# Patient Record
Sex: FEMALE | Race: WHITE | NOT HISPANIC OR LATINO | Employment: FULL TIME | RURAL
[De-identification: names, ages, dates, MRNs, and addresses within clinical notes are randomized per-mention and may not be internally consistent; named-entity substitution may affect disease eponyms.]

---

## 2021-08-02 ENCOUNTER — TELEPHONE (OUTPATIENT)
Dept: FAMILY MEDICINE CLINIC | Facility: CLINIC | Age: 46
End: 2021-08-02

## 2021-08-02 ENCOUNTER — OFFICE VISIT (OUTPATIENT)
Dept: FAMILY MEDICINE CLINIC | Facility: CLINIC | Age: 46
End: 2021-08-02

## 2021-08-02 VITALS
DIASTOLIC BLOOD PRESSURE: 86 MMHG | OXYGEN SATURATION: 98 % | HEIGHT: 66 IN | SYSTOLIC BLOOD PRESSURE: 136 MMHG | TEMPERATURE: 98.4 F | WEIGHT: 256 LBS | HEART RATE: 66 BPM | RESPIRATION RATE: 18 BRPM | BODY MASS INDEX: 41.14 KG/M2

## 2021-08-02 DIAGNOSIS — M51.36 DDD (DEGENERATIVE DISC DISEASE), LUMBAR: ICD-10-CM

## 2021-08-02 DIAGNOSIS — M79.7 FIBROMYALGIA: ICD-10-CM

## 2021-08-02 DIAGNOSIS — F32.81 PREMENSTRUAL DYSPHORIC SYNDROME: ICD-10-CM

## 2021-08-02 DIAGNOSIS — I10 ESSENTIAL HYPERTENSION: Primary | ICD-10-CM

## 2021-08-02 DIAGNOSIS — Z11.59 NEED FOR HEPATITIS C SCREENING TEST: ICD-10-CM

## 2021-08-02 DIAGNOSIS — F41.9 ANXIETY: ICD-10-CM

## 2021-08-02 DIAGNOSIS — K21.9 GASTROESOPHAGEAL REFLUX DISEASE, UNSPECIFIED WHETHER ESOPHAGITIS PRESENT: ICD-10-CM

## 2021-08-02 DIAGNOSIS — Z13.220 SCREENING FOR HYPERLIPIDEMIA: ICD-10-CM

## 2021-08-02 DIAGNOSIS — R53.81 MALAISE AND FATIGUE: ICD-10-CM

## 2021-08-02 DIAGNOSIS — F33.0 MILD EPISODE OF RECURRENT MAJOR DEPRESSIVE DISORDER (HCC): Primary | ICD-10-CM

## 2021-08-02 DIAGNOSIS — R53.83 MALAISE AND FATIGUE: ICD-10-CM

## 2021-08-02 PROCEDURE — 99203 OFFICE O/P NEW LOW 30 MIN: CPT | Performed by: FAMILY MEDICINE

## 2021-08-02 RX ORDER — MELOXICAM 15 MG/1
15 TABLET ORAL DAILY
COMMUNITY
End: 2021-08-02 | Stop reason: SDUPTHER

## 2021-08-02 RX ORDER — OMEPRAZOLE 20 MG/1
20 CAPSULE, DELAYED RELEASE ORAL DAILY
Qty: 30 CAPSULE | Refills: 4 | Status: SHIPPED | OUTPATIENT
Start: 2021-08-02 | End: 2022-11-17 | Stop reason: SDUPTHER

## 2021-08-02 RX ORDER — FLUOXETINE 10 MG/1
10 CAPSULE ORAL DAILY
COMMUNITY
End: 2021-08-02 | Stop reason: SDUPTHER

## 2021-08-02 RX ORDER — MELOXICAM 15 MG/1
15 TABLET ORAL DAILY
Qty: 30 TABLET | Refills: 12 | Status: SHIPPED | OUTPATIENT
Start: 2021-08-02 | End: 2022-11-17 | Stop reason: SDUPTHER

## 2021-08-02 RX ORDER — AMLODIPINE BESYLATE AND BENAZEPRIL HYDROCHLORIDE 10; 20 MG/1; MG/1
1 CAPSULE ORAL DAILY
COMMUNITY
End: 2021-08-02

## 2021-08-02 RX ORDER — FLUOXETINE 10 MG/1
10 CAPSULE ORAL DAILY
Qty: 30 CAPSULE | Refills: 12 | Status: SHIPPED | OUTPATIENT
Start: 2021-08-02 | End: 2021-08-19 | Stop reason: DRUGHIGH

## 2021-08-02 NOTE — PROGRESS NOTES
"  Chief Complaint   Patient presents with   • Annual Exam         Subjective   Alexus Serrano is a 45 y.o. female here for a Well Woman Visit. Energy level is described as poor and she is sleeping poorly. She sleeps 3 hours nightly. Last pap was about a year ago has not established care with GYN in [place] yet. Contraception: none. Patient exercises irregularly. Nutrition is described as in general, an \"unhealthy\" diet. Her   libido is abnormal. She reports that she does perform monthly self breast exam.      History of Present Illness     I personally reviewed and updated the patient's allergies, medications, problem list, and past medical, surgical, social, and family history.     Allergies:  No Known Allergies    Social History:  Social History     Socioeconomic History   • Marital status: Single     Spouse name: Not on file   • Number of children: Not on file   • Years of education: Not on file   • Highest education level: Not on file   Tobacco Use   • Smoking status: Current Every Day Smoker     Start date: 8/2/2021   • Tobacco comment: vapes    Vaping Use   • Vaping Use: Every day   • Substances: Nicotine, Flavoring   • Devices: Pre-filled or refillable cartridge   Substance and Sexual Activity   • Alcohol use: Not Currently   • Drug use: Not Currently     Types: Methamphetamines     Comment: clean for 27 months    • Sexual activity: Not Currently       Family History:  Family History   Problem Relation Age of Onset   • Cancer Mother 61        lung   • Colon cancer Maternal Grandmother 62   • Breast cancer Other         paternal aunt and cousin       Past Medical History :  Active Ambulatory Problems     Diagnosis Date Noted   • Fibromyalgia 07/11/2012   • DDD (degenerative disc disease), lumbar 07/11/2012   • Premenstrual dysphoric syndrome 07/11/2012   • Essential hypertension 08/02/2021   • Anxiety 08/02/2021   • Mild episode of recurrent major depressive disorder (CMS/[facility]) 08/02/2021   • GERD " "(gastroesophageal reflux disease) 08/02/2021     Resolved Ambulatory Problems     Diagnosis Date Noted   • No Resolved Ambulatory Problems     No Additional Past Medical History       Medication List:    Current Outpatient Medications:   •  FLUoxetine (PROzac) 10 MG capsule, Take 1 capsule by mouth Daily., Disp: 30 capsule, Rfl: 12  •  meloxicam (MOBIC) 15 MG tablet, Take 1 tablet by mouth Daily., Disp: 30 tablet, Rfl: 12  •  omeprazole (priLOSEC) 20 MG capsule, Take 1 capsule by mouth Daily., Disp: 30 capsule, Rfl: 4    Past Surgical History:  History reviewed. No pertinent surgical history.    Depression Screen:   No flowsheet data found.    Fall Risk Screen:  EYAD Fall Risk Assessment has not been completed.    Review Of Systems:  Review of Systems   Constitutional: Negative for activity change and fever.   HENT: Negative for ear pain, rhinorrhea, sinus pressure and voice change.    Eyes: Negative for visual disturbance.   Respiratory: Negative for cough and shortness of breath.    Cardiovascular: Negative for chest pain.   Gastrointestinal: Negative for abdominal pain, diarrhea, nausea and vomiting.   Endocrine: Negative for cold intolerance and heat intolerance.   Genitourinary: Negative for frequency and urgency.   Musculoskeletal: Negative for arthralgias.   Skin: Negative for rash.   Neurological: Negative for syncope.   Hematological: Does not bruise/bleed easily.   Psychiatric/Behavioral: Negative for depressed mood. The patient is not nervous/anxious.        Physical Exam:  Vital Signs:  Visit Vitals  /86 (BP Location: Left arm, Patient Position: Sitting, Cuff Size: Adult)   Pulse 66   Temp 98.4 °F (36.9 °C)   Resp 18   Ht 167.6 cm (66\")   Wt 116 kg (256 lb)   SpO2 98%   BMI 41.32 kg/m²       Physical Exam  Vitals and nursing note reviewed.   Constitutional:       General: She is not in acute distress.     Appearance: She is well-developed. She is not diaphoretic.   HENT:      Head: Normocephalic " and atraumatic.      Right Ear: External ear normal.      Left Ear: External ear normal.      Nose: Nose normal.      Mouth/Throat:      Pharynx: No oropharyngeal exudate.   Eyes:      General: No scleral icterus.        Right eye: No discharge.         Left eye: No discharge.      Conjunctiva/sclera: Conjunctivae normal.      Pupils: Pupils are equal, round, and reactive to light.   Neck:      Thyroid: No thyromegaly.      Trachea: No tracheal deviation.   Cardiovascular:      Rate and Rhythm: Normal rate and regular rhythm.      Heart sounds: Normal heart sounds. No murmur heard.   No friction rub. No gallop.    Pulmonary:      Effort: Pulmonary effort is normal. No respiratory distress.      Breath sounds: Normal breath sounds. No stridor. No wheezing or rales.   Abdominal:      General: Bowel sounds are normal. There is no distension.      Palpations: Abdomen is soft. There is no mass.      Tenderness: There is no abdominal tenderness. There is no guarding or rebound.   Musculoskeletal:         General: No tenderness or deformity. Normal range of motion.      Cervical back: Normal range of motion and neck supple.   Lymphadenopathy:      Cervical: No cervical adenopathy.   Skin:     General: Skin is warm and dry.      Capillary Refill: Capillary refill takes less than 2 seconds.      Coloration: Skin is not pale.      Findings: No erythema or rash.   Neurological:      Mental Status: She is alert and oriented to person, place, and time.      Cranial Nerves: No cranial nerve deficit.      Sensory: No sensory deficit.      Motor: No tremor, atrophy or abnormal muscle tone.      Coordination: Coordination normal.      Gait: Gait normal.      Deep Tendon Reflexes: Reflexes are normal and symmetric. Reflexes normal.   Psychiatric:         Behavior: Behavior normal.         Thought Content: Thought content normal.         Cognition and Memory: Memory is not impaired. She does not exhibit impaired recent memory or  impaired remote memory.         Judgment: Judgment normal.           Assessment and Plan:  Problem List Items Addressed This Visit        Gastrointestinal Abdominal     GERD (gastroesophageal reflux disease)    Current Assessment & Plan     Medication refilled         Relevant Medications    omeprazole (priLOSEC) 20 MG capsule       Mental Health    Premenstrual dysphoric syndrome    Relevant Medications    FLUoxetine (PROzac) 10 MG capsule    Anxiety    Relevant Medications    FLUoxetine (PROzac) 10 MG capsule    Mild episode of recurrent major depressive disorder (CMS/HCC) - Primary    Current Assessment & Plan     Patient's depression is recurrent and is moderate without psychosis. Their depression is currently active and the condition is unchanged. This will be reassessed at the next regular appointment. F/U as described:patient will continue current medication therapy.         Relevant Medications    FLUoxetine (PROzac) 10 MG capsule       Musculoskeletal and Injuries    Fibromyalgia    Relevant Medications    meloxicam (MOBIC) 15 MG tablet       Neuro    DDD (degenerative disc disease), lumbar    Relevant Medications    meloxicam (MOBIC) 15 MG tablet          An After Visit Summary and PPPS were given to the patient.       Discussed injury prevention, diet and exercise, safe sexual practices, and screening for common diseases. Encouraged use of sunscreen and seatbelts. Discussed timing of  cervical cancer screening. Encouraged monthly self-breast exams, yearly clinical breast exams, and discussed timing of mammograms. Avoidance of tobacco encouraged. Limitation or avoidance of alcohol encouraged. Recommend yearly dental and eye exams. Also discussed monitoring of blood pressure, lipids.     I wore protective equipment throughout this patient encounter to include mask. Hand hygiene was performed before donning protective equipment and after removal when leaving the room.

## 2021-08-09 NOTE — ASSESSMENT & PLAN NOTE
Patient's depression is recurrent and is moderate without psychosis. Their depression is currently active and the condition is unchanged. This will be reassessed at the next regular appointment. F/U as described:patient will continue current medication therapy.

## 2021-08-19 ENCOUNTER — OFFICE VISIT (OUTPATIENT)
Dept: FAMILY MEDICINE CLINIC | Facility: CLINIC | Age: 46
End: 2021-08-19

## 2021-08-19 VITALS
BODY MASS INDEX: 41.62 KG/M2 | OXYGEN SATURATION: 98 % | RESPIRATION RATE: 18 BRPM | WEIGHT: 259 LBS | SYSTOLIC BLOOD PRESSURE: 140 MMHG | HEART RATE: 85 BPM | HEIGHT: 66 IN | TEMPERATURE: 98.4 F | DIASTOLIC BLOOD PRESSURE: 88 MMHG

## 2021-08-19 DIAGNOSIS — F33.0 MILD EPISODE OF RECURRENT MAJOR DEPRESSIVE DISORDER (HCC): Primary | ICD-10-CM

## 2021-08-19 DIAGNOSIS — I10 ESSENTIAL HYPERTENSION: ICD-10-CM

## 2021-08-19 DIAGNOSIS — J01.00 ACUTE NON-RECURRENT MAXILLARY SINUSITIS: ICD-10-CM

## 2021-08-19 DIAGNOSIS — F41.9 ANXIETY: ICD-10-CM

## 2021-08-19 PROCEDURE — 99214 OFFICE O/P EST MOD 30 MIN: CPT | Performed by: FAMILY MEDICINE

## 2021-08-19 RX ORDER — CEPHALEXIN 500 MG/1
500 CAPSULE ORAL 3 TIMES DAILY
Qty: 30 CAPSULE | Refills: 0 | Status: SHIPPED | OUTPATIENT
Start: 2021-08-19 | End: 2021-09-16

## 2021-08-19 RX ORDER — FLUOXETINE HYDROCHLORIDE 20 MG/1
20 CAPSULE ORAL DAILY
Qty: 30 CAPSULE | Refills: 2 | Status: SHIPPED | OUTPATIENT
Start: 2021-08-19 | End: 2021-09-16 | Stop reason: SDUPTHER

## 2021-08-19 NOTE — PROGRESS NOTES
Subjective   Alexus Serrano is a 45 y.o. female.     Chief Complaint   Patient presents with   • Depression   • Sore Throat       Depression  Visit Type: follow-up  Patient is not experiencing: decreased concentration, depressed mood, excessive worry, feelings of hopelessness, feelings of worthlessness, irritability, memory impairment, nervousness/anxiety, palpitations, shortness of breath, suicidal ideas and suicidal planning.  Frequency of symptoms: constantly   Severity: mild   Sleep quality: good  Nighttime awakenings: one to two  Compliance with medications:  %        Sore Throat   This is a chronic problem. The current episode started more than 1 year ago. The problem has been waxing and waning. Sore throat worse side: both. There has been no fever. The pain is moderate. Associated symptoms include trouble swallowing. Pertinent negatives include no abdominal pain, congestion, coughing, diarrhea, ear pain, hoarse voice, shortness of breath or vomiting. She has had no exposure to strep or mono. She has tried nothing for the symptoms.        I personally reviewed and updated the patient's allergies, medications, problem list, and past medical, surgical, social, and family history. I have reviewed and confirmed the accuracy of the History of Present Illness and Review of Symptoms as documented by the MA/LPN/RN. Mela Casey MD    Allergies:  No Known Allergies    Social History:  Social History     Socioeconomic History   • Marital status: Single     Spouse name: Not on file   • Number of children: Not on file   • Years of education: Not on file   • Highest education level: Not on file   Tobacco Use   • Smoking status: Current Every Day Smoker     Start date: 8/2/2021   • Tobacco comment: vapes    Vaping Use   • Vaping Use: Every day   • Substances: Nicotine, Flavoring   • Devices: Pre-filled or refillable cartridge   Substance and Sexual Activity   • Alcohol use: Not Currently   • Drug use: Not  Currently     Types: Methamphetamines     Comment: clean for 27 months    • Sexual activity: Not Currently       Family History:  Family History   Problem Relation Age of Onset   • Cancer Mother 61        lung   • Colon cancer Maternal Grandmother 62   • Breast cancer Other         paternal aunt and cousin       Past Medical History :  Patient Active Problem List   Diagnosis   • Fibromyalgia   • DDD (degenerative disc disease), lumbar   • Premenstrual dysphoric syndrome   • Essential hypertension   • Anxiety   • Mild episode of recurrent major depressive disorder (CMS/HCC)   • GERD (gastroesophageal reflux disease)   • Acute non-recurrent maxillary sinusitis       Medication List:    Current Outpatient Medications:   •  meloxicam (MOBIC) 15 MG tablet, Take 1 tablet by mouth Daily., Disp: 30 tablet, Rfl: 12  •  omeprazole (priLOSEC) 20 MG capsule, Take 1 capsule by mouth Daily., Disp: 30 capsule, Rfl: 4  •  cephalexin (KEFLEX) 500 MG capsule, Take 1 capsule by mouth 3 (Three) Times a Day., Disp: 30 capsule, Rfl: 0  •  FLUoxetine (PROzac) 20 MG capsule, Take 1 capsule by mouth Daily., Disp: 30 capsule, Rfl: 2    Past Surgical History:  No past surgical history on file.    Review of Systems:  Review of Systems   Constitutional: Negative for activity change, fever and irritability.   HENT: Positive for sore throat and trouble swallowing. Negative for congestion, ear pain, hoarse voice, rhinorrhea, sinus pressure and voice change.    Eyes: Negative for visual disturbance.   Respiratory: Negative for cough and shortness of breath.    Cardiovascular: Negative for chest pain and palpitations.   Gastrointestinal: Negative for abdominal pain, diarrhea, nausea and vomiting.   Endocrine: Negative for cold intolerance and heat intolerance.   Genitourinary: Negative for frequency and urgency.   Musculoskeletal: Negative for arthralgias.   Skin: Negative for rash.   Neurological: Negative for syncope.   Hematological: Does not  "bruise/bleed easily.   Psychiatric/Behavioral: Negative for decreased concentration, suicidal ideas and depressed mood. The patient is not nervous/anxious.        Physical Exam:  Vital Signs:  Vital Signs:   /88   Pulse 85   Temp 98.4 °F (36.9 °C)   Resp 18   Ht 167.6 cm (66\")   Wt 117 kg (259 lb)   SpO2 98%   BMI 41.80 kg/m²     Result Review :                Physical Exam  Vitals reviewed.   Constitutional:       Appearance: Normal appearance. She is well-developed.   HENT:      Head: Normocephalic and atraumatic.   Eyes:      General:         Right eye: No discharge.         Left eye: No discharge.   Cardiovascular:      Rate and Rhythm: Normal rate and regular rhythm.      Heart sounds: Normal heart sounds. No murmur heard.   No friction rub. No gallop.    Pulmonary:      Effort: Pulmonary effort is normal. No respiratory distress.      Breath sounds: Normal breath sounds. No wheezing or rales.   Skin:     General: Skin is warm and dry.      Findings: No rash.   Neurological:      Mental Status: She is alert and oriented to person, place, and time.      Coordination: Coordination normal.      Gait: Gait normal.   Psychiatric:         Behavior: Behavior is cooperative.         Assessment and Plan:  Problems Addressed this Visit        Cardiac and Vasculature    Essential hypertension     Elevated but not feeling well.             ENT    Acute non-recurrent maxillary sinusitis     increase fluids, tylenol for fever, motrin for pain. Humidifier to help with congestion and to sleep at night. Dicussed OTC meds, gargle with warm salt water. If there is recurrent fever, shortness of breath, lethargy, advised to come in to the office or go to the ER.           Relevant Medications    cephalexin (KEFLEX) 500 MG capsule       Mental Health    Anxiety    Relevant Medications    FLUoxetine (PROzac) 20 MG capsule    Mild episode of recurrent major depressive disorder (CMS/HCC) - Primary     Patient's depression " is recurrent and is moderate without psychosis. Their depression is currently active and the condition is worsening. This will be reassessed in 4 weeks. F/U as described:patient was prescribed an antidepressant medicine.         Relevant Medications    FLUoxetine (PROzac) 20 MG capsule      Diagnoses       Codes Comments    Mild episode of recurrent major depressive disorder (CMS/HCC)    -  Primary ICD-10-CM: F33.0  ICD-9-CM: 296.31     Essential hypertension     ICD-10-CM: I10  ICD-9-CM: 401.9     Anxiety     ICD-10-CM: F41.9  ICD-9-CM: 300.00     Acute non-recurrent maxillary sinusitis     ICD-10-CM: J01.00  ICD-9-CM: 461.0            An After Visit Summary and PPPS were given to the patient.         I wore protective equipment throughout this patient encounter to include mask. Hand hygiene was performed before donning protective equipment and after removal when leaving the room.

## 2021-08-22 LAB
ALBUMIN SERPL-MCNC: 4.2 G/DL (ref 3.8–4.8)
ALBUMIN/GLOB SERPL: 1.4 {RATIO} (ref 1.2–2.2)
ALP SERPL-CCNC: 124 IU/L (ref 48–121)
ALT SERPL-CCNC: 17 IU/L (ref 0–32)
AST SERPL-CCNC: 14 IU/L (ref 0–40)
BASOPHILS # BLD AUTO: 0 X10E3/UL (ref 0–0.2)
BASOPHILS NFR BLD AUTO: 0 %
BILIRUB SERPL-MCNC: <0.2 MG/DL (ref 0–1.2)
BUN SERPL-MCNC: 18 MG/DL (ref 6–24)
BUN/CREAT SERPL: 28 (ref 9–23)
CALCIUM SERPL-MCNC: 9.8 MG/DL (ref 8.7–10.2)
CHLORIDE SERPL-SCNC: 102 MMOL/L (ref 96–106)
CHOLEST SERPL-MCNC: 241 MG/DL (ref 100–199)
CHOLEST/HDLC SERPL: 4.6 RATIO (ref 0–4.4)
CO2 SERPL-SCNC: 23 MMOL/L (ref 20–29)
CREAT SERPL-MCNC: 0.64 MG/DL (ref 0.57–1)
EOSINOPHIL # BLD AUTO: 0.5 X10E3/UL (ref 0–0.4)
EOSINOPHIL NFR BLD AUTO: 4 %
ERYTHROCYTE [DISTWIDTH] IN BLOOD BY AUTOMATED COUNT: 15.1 % (ref 11.7–15.4)
GLOBULIN SER CALC-MCNC: 3.1 G/DL (ref 1.5–4.5)
GLUCOSE SERPL-MCNC: 93 MG/DL (ref 65–99)
HCT VFR BLD AUTO: 38.5 % (ref 34–46.6)
HCV RNA SERPL NAA+PROBE-ACNC: NORMAL IU/ML
HDLC SERPL-MCNC: 52 MG/DL
HGB BLD-MCNC: 12.2 G/DL (ref 11.1–15.9)
IMM GRANULOCYTES # BLD AUTO: 0 X10E3/UL (ref 0–0.1)
IMM GRANULOCYTES NFR BLD AUTO: 0 %
LDLC SERPL CALC-MCNC: 156 MG/DL (ref 0–99)
LYMPHOCYTES # BLD AUTO: 2.9 X10E3/UL (ref 0.7–3.1)
LYMPHOCYTES NFR BLD AUTO: 25 %
MCH RBC QN AUTO: 26.1 PG (ref 26.6–33)
MCHC RBC AUTO-ENTMCNC: 31.7 G/DL (ref 31.5–35.7)
MCV RBC AUTO: 82 FL (ref 79–97)
MONOCYTES # BLD AUTO: 0.6 X10E3/UL (ref 0.1–0.9)
MONOCYTES NFR BLD AUTO: 5 %
NEUTROPHILS # BLD AUTO: 7.3 X10E3/UL (ref 1.4–7)
NEUTROPHILS NFR BLD AUTO: 66 %
PLATELET # BLD AUTO: 379 X10E3/UL (ref 150–450)
POTASSIUM SERPL-SCNC: 5 MMOL/L (ref 3.5–5.2)
PROT SERPL-MCNC: 7.3 G/DL (ref 6–8.5)
RBC # BLD AUTO: 4.67 X10E6/UL (ref 3.77–5.28)
SODIUM SERPL-SCNC: 139 MMOL/L (ref 134–144)
TEST INFORMATION: NORMAL
TRIGL SERPL-MCNC: 184 MG/DL (ref 0–149)
TSH SERPL DL<=0.005 MIU/L-ACNC: 1.07 UIU/ML (ref 0.45–4.5)
VLDLC SERPL CALC-MCNC: 33 MG/DL (ref 5–40)
WBC # BLD AUTO: 11.3 X10E3/UL (ref 3.4–10.8)

## 2021-08-24 ENCOUNTER — TELEPHONE (OUTPATIENT)
Dept: FAMILY MEDICINE CLINIC | Facility: CLINIC | Age: 46
End: 2021-08-24

## 2021-08-24 NOTE — TELEPHONE ENCOUNTER
----- Message from Saniya Key MA sent at 8/23/2021  2:34 PM EDT -----    ----- Message -----  From: Mela Casey MD  Sent: 8/23/2021  11:00 AM EDT  To: Saniya Key MA    A few labs are off. Her WBC is up. Has she ever had an elevated wbc? Ask about infections like burning with urination or cough or sinus pressure. Her cholesterol is elevated. I will go more in depth when she is here for her visit.

## 2021-08-26 ENCOUNTER — TELEPHONE (OUTPATIENT)
Dept: FAMILY MEDICINE CLINIC | Facility: CLINIC | Age: 46
End: 2021-08-26

## 2021-09-16 ENCOUNTER — OFFICE VISIT (OUTPATIENT)
Dept: FAMILY MEDICINE CLINIC | Facility: CLINIC | Age: 46
End: 2021-09-16

## 2021-09-16 VITALS
SYSTOLIC BLOOD PRESSURE: 122 MMHG | DIASTOLIC BLOOD PRESSURE: 76 MMHG | HEART RATE: 103 BPM | HEIGHT: 66 IN | TEMPERATURE: 98.1 F | RESPIRATION RATE: 18 BRPM | WEIGHT: 260.2 LBS | OXYGEN SATURATION: 97 % | BODY MASS INDEX: 41.82 KG/M2

## 2021-09-16 DIAGNOSIS — F41.9 ANXIETY: ICD-10-CM

## 2021-09-16 DIAGNOSIS — R06.83 SNORING: ICD-10-CM

## 2021-09-16 DIAGNOSIS — F32.81 PREMENSTRUAL DYSPHORIC SYNDROME: ICD-10-CM

## 2021-09-16 DIAGNOSIS — F33.0 MILD EPISODE OF RECURRENT MAJOR DEPRESSIVE DISORDER (HCC): ICD-10-CM

## 2021-09-16 DIAGNOSIS — Z12.31 SCREENING MAMMOGRAM, ENCOUNTER FOR: Primary | ICD-10-CM

## 2021-09-16 PROBLEM — J01.00 ACUTE NON-RECURRENT MAXILLARY SINUSITIS: Status: RESOLVED | Noted: 2021-08-19 | Resolved: 2021-09-16

## 2021-09-16 PROCEDURE — 99214 OFFICE O/P EST MOD 30 MIN: CPT | Performed by: FAMILY MEDICINE

## 2021-09-16 RX ORDER — FLUOXETINE HYDROCHLORIDE 20 MG/1
20 CAPSULE ORAL DAILY
Qty: 30 CAPSULE | Refills: 12 | Status: SHIPPED | OUTPATIENT
Start: 2021-09-16 | End: 2022-04-05 | Stop reason: SDUPTHER

## 2021-09-16 NOTE — PROGRESS NOTES
Subjective   Alexus Serrano is a 45 y.o. female.     Chief Complaint   Patient presents with   • Depression       Patient would possibly want a sleep study      Depression  Visit Type: follow-up  Patient presents with the following symptoms: insomnia.  Patient is not experiencing: decreased concentration, depressed mood, dry mouth, excessive worry, feelings of hopelessness, feelings of worthlessness, irritability, nausea, nervousness/anxiety, palpitations, panic, shortness of breath, suicidal ideas, suicidal planning and thoughts of death.  Frequency of symptoms: most days   Sleep per night: 4 hours  Sleep quality: poor  Nighttime awakenings: one to two       She has started noticing snoring with her weight gain. She is waking up snoring. When she is home she is not falling asleep but she does nap.     I personally reviewed and updated the patient's allergies, medications, problem list, and past medical, surgical, social, and family history. I have reviewed and confirmed the accuracy of the History of Present Illness and Review of Symptoms as documented by the MA/LPN/RN. Mela Casey MD    Allergies:  No Known Allergies    Social History:  Social History     Socioeconomic History   • Marital status: Single     Spouse name: Not on file   • Number of children: Not on file   • Years of education: Not on file   • Highest education level: Not on file   Tobacco Use   • Smoking status: Current Every Day Smoker     Start date: 8/2/2021   • Tobacco comment: vapes    Vaping Use   • Vaping Use: Every day   • Substances: Nicotine, Flavoring   • Devices: Pre-filled or refillable cartridge   Substance and Sexual Activity   • Alcohol use: Not Currently   • Drug use: Not Currently     Types: Methamphetamines     Comment: clean for 27 months    • Sexual activity: Not Currently       Family History:  Family History   Problem Relation Age of Onset   • Cancer Mother 61        lung   • Colon cancer Maternal Grandmother 62   •  "Breast cancer Other         paternal aunt and cousin       Past Medical History :  Patient Active Problem List   Diagnosis   • Fibromyalgia   • DDD (degenerative disc disease), lumbar   • Premenstrual dysphoric syndrome   • Essential hypertension   • Anxiety   • Mild episode of recurrent major depressive disorder (CMS/HCC)   • GERD (gastroesophageal reflux disease)   • Snoring       Medication List:    Current Outpatient Medications:   •  FLUoxetine (PROzac) 20 MG capsule, Take 1 capsule by mouth Daily., Disp: 30 capsule, Rfl: 12  •  meloxicam (MOBIC) 15 MG tablet, Take 1 tablet by mouth Daily., Disp: 30 tablet, Rfl: 12  •  omeprazole (priLOSEC) 20 MG capsule, Take 1 capsule by mouth Daily., Disp: 30 capsule, Rfl: 4    Past Surgical History:  History reviewed. No pertinent surgical history.    Review of Systems:  Review of Systems   Constitutional: Negative for activity change, fever and irritability.   HENT: Negative for ear pain, rhinorrhea, sinus pressure and voice change.    Eyes: Negative for visual disturbance.   Respiratory: Negative for cough and shortness of breath.    Cardiovascular: Negative for chest pain and palpitations.   Gastrointestinal: Negative for abdominal pain, diarrhea, nausea and vomiting.   Endocrine: Negative for cold intolerance and heat intolerance.   Genitourinary: Negative for frequency and urgency.   Musculoskeletal: Negative for arthralgias.   Skin: Negative for rash.   Neurological: Negative for syncope.   Hematological: Does not bruise/bleed easily.   Psychiatric/Behavioral: Negative for decreased concentration, suicidal ideas and depressed mood. The patient has insomnia. The patient is not nervous/anxious.        Physical Exam:  Vital Signs:  Vital Signs:   /76   Pulse 103   Temp 98.1 °F (36.7 °C)   Resp 18   Ht 167.6 cm (66\")   Wt 118 kg (260 lb 3.2 oz)   SpO2 97%   BMI 42.00 kg/m²     Result Review :                Physical Exam  Vitals reviewed.   Constitutional:  "      Appearance: Normal appearance. She is well-developed.   HENT:      Head: Normocephalic and atraumatic.   Eyes:      General:         Right eye: No discharge.         Left eye: No discharge.   Cardiovascular:      Rate and Rhythm: Normal rate and regular rhythm.      Heart sounds: Normal heart sounds. No murmur heard.   No friction rub. No gallop.    Pulmonary:      Effort: Pulmonary effort is normal. No respiratory distress.      Breath sounds: Normal breath sounds. No wheezing or rales.   Skin:     General: Skin is warm and dry.      Findings: No rash.   Neurological:      Mental Status: She is alert and oriented to person, place, and time.      Coordination: Coordination normal.      Gait: Gait normal.   Psychiatric:         Behavior: Behavior is cooperative.         Assessment and Plan:  Problems Addressed this Visit        Mental Health    Premenstrual dysphoric syndrome     Patient's depression is recurrent and is moderate without psychosis. Their depression is currently active and the condition is improving with treatment. This will be reassessed at the next regular appointment. F/U as described:patient will continue current medication therapy.         Relevant Medications    FLUoxetine (PROzac) 20 MG capsule    Anxiety    Relevant Medications    FLUoxetine (PROzac) 20 MG capsule    Mild episode of recurrent major depressive disorder (CMS/HCC)     Patient's depression is recurrent and is moderate without psychosis. Their depression is currently active and the condition is improving with treatment. This will be reassessed at the next regular appointment. F/U as described:continue current treatment         Relevant Medications    FLUoxetine (PROzac) 20 MG capsule       Sleep    Snoring     Will set her up for a sleep evaluation           Other Visit Diagnoses     Screening mammogram, encounter for    -  Primary    Relevant Orders    Mammo Screening Digital Tomosynthesis Bilateral With CAD      Diagnoses        Codes Comments    Screening mammogram, encounter for    -  Primary ICD-10-CM: Z12.31  ICD-9-CM: V76.12     Mild episode of recurrent major depressive disorder (CMS/HCC)     ICD-10-CM: F33.0  ICD-9-CM: 296.31     Premenstrual dysphoric syndrome     ICD-10-CM: F32.81  ICD-9-CM: 625.4     Snoring     ICD-10-CM: R06.83  ICD-9-CM: 786.09     Anxiety     ICD-10-CM: F41.9  ICD-9-CM: 300.00            An After Visit Summary and PPPS were given to the patient.         I wore protective equipment throughout this patient encounter to include mask. Hand hygiene was performed before donning protective equipment and after removal when leaving the room.

## 2021-09-16 NOTE — ASSESSMENT & PLAN NOTE
Patient's depression is recurrent and is moderate without psychosis. Their depression is currently active and the condition is improving with treatment. This will be reassessed at the next regular appointment. F/U as described:continue current treatment

## 2021-09-21 ENCOUNTER — TELEPHONE (OUTPATIENT)
Dept: FAMILY MEDICINE CLINIC | Facility: CLINIC | Age: 46
End: 2021-09-21

## 2021-10-08 ENCOUNTER — TELEPHONE (OUTPATIENT)
Dept: FAMILY MEDICINE CLINIC | Facility: CLINIC | Age: 46
End: 2021-10-08

## 2021-10-08 RX ORDER — SULFAMETHOXAZOLE AND TRIMETHOPRIM 800; 160 MG/1; MG/1
1 TABLET ORAL 2 TIMES DAILY
Qty: 6 TABLET | Refills: 0 | Status: SHIPPED | OUTPATIENT
Start: 2021-10-08 | End: 2021-10-12

## 2021-10-08 NOTE — TELEPHONE ENCOUNTER
Hub staff attempted to follow warm transfer process and was unsuccessful     Caller: Alexus Serrano    Relationship to patient: Self    Best call back number: [phone]     Patient is needing: PATIENT CALLED TO REQUEST APPOINTMENT FOR 10/11/21 DUE TO LOWER BACK PAIN AND FREQUENT URINATION/PRESSURE.

## 2021-10-08 NOTE — TELEPHONE ENCOUNTER
Patient is dropping off urine on Monday she said she couldn't today. I told her that was fine she asked to send antibiotic to [facility] in [place]

## 2021-10-11 ENCOUNTER — TELEPHONE (OUTPATIENT)
Dept: FAMILY MEDICINE CLINIC | Facility: CLINIC | Age: 46
End: 2021-10-11

## 2021-10-11 NOTE — PROGRESS NOTES
Subjective   Alexus Serrano is a 45 y.o. female. Presents to [facility]    Chief Complaint   Patient presents with   • Flank Pain       Flank Pain  This is a new problem. The current episode started in the past 7 days. The problem occurs daily. The problem has been gradually worsening since onset. The pain is present in the gluteal. The quality of the pain is described as burning. Associated symptoms include dysuria. Pertinent negatives include no abdominal pain, bladder incontinence, bowel incontinence, chest pain, fever, headaches, leg pain, pelvic pain, tingling or weakness. (Pressure to urinate  Worse at night  ) Treatments tried: bactrim.      Culture was negative. She is still having pressure and urge to urinate. She drinks one cup of coffee a day.     I personally reviewed and updated the patient's allergies, medications, problem list, and past medical, surgical, social, and family history. I have reviewed and confirmed the accuracy of the History of Present Illness and Review of Symptoms as documented by the MA/LPN/RN. Mela Casey MD    Allergies:  No Known Allergies    Social History:  Social History     Socioeconomic History   • Marital status: Single   Tobacco Use   • Smoking status: Current Every Day Smoker     Start date: 8/2/2021   • Tobacco comment: vapes    Vaping Use   • Vaping Use: Every day   • Substances: Nicotine, Flavoring   • Devices: Pre-filled or refillable cartridge   Substance and Sexual Activity   • Alcohol use: Not Currently   • Drug use: Not Currently     Types: Methamphetamines     Comment: clean for 27 months    • Sexual activity: Not Currently       Family History:  Family History   Problem Relation Age of Onset   • Cancer Mother 61        lung   • Colon cancer Maternal Grandmother 62   • Breast cancer Other         paternal aunt and cousin       Past Medical History :  Patient Active Problem List   Diagnosis   • Fibromyalgia   • DDD (degenerative disc  "disease), lumbar   • Premenstrual dysphoric syndrome   • Essential hypertension   • Anxiety   • Mild episode of recurrent major depressive disorder (HCC)   • GERD (gastroesophageal reflux disease)   • Snoring   • Pelvic pain       Medication List:    Current Outpatient Medications:   •  FLUoxetine (PROzac) 20 MG capsule, Take 1 capsule by mouth Daily., Disp: 30 capsule, Rfl: 12  •  meloxicam (MOBIC) 15 MG tablet, Take 1 tablet by mouth Daily., Disp: 30 tablet, Rfl: 12  •  omeprazole (priLOSEC) 20 MG capsule, Take 1 capsule by mouth Daily., Disp: 30 capsule, Rfl: 4  •  metroNIDAZOLE (METROGEL VAGINAL) 0.75 % vaginal gel, Insert  into the vagina 2 (Two) Times a Day., Disp: 70 g, Rfl: 0    Past Surgical History:  No past surgical history on file.    Review of Systems:  Review of Systems   Constitutional: Negative for activity change and fever.   HENT: Negative for ear pain, rhinorrhea, sinus pressure and voice change.    Eyes: Negative for visual disturbance.   Respiratory: Negative for cough and shortness of breath.    Cardiovascular: Negative for chest pain.   Gastrointestinal: Negative for abdominal pain, bowel incontinence, diarrhea, nausea and vomiting.   Endocrine: Negative for cold intolerance and heat intolerance.   Genitourinary: Positive for dysuria and flank pain. Negative for frequency, pelvic pain, urgency and urinary incontinence.   Musculoskeletal: Negative for arthralgias.   Skin: Negative for rash.   Neurological: Negative for tingling, syncope and weakness.   Hematological: Does not bruise/bleed easily.   Psychiatric/Behavioral: Negative for depressed mood. The patient is not nervous/anxious.        Physical Exam:  Vital Signs:  Vital Signs:   /86   Pulse 105   Temp 98.9 °F (37.2 °C)   Resp 18   Ht 167.6 cm (66\")   Wt 119 kg (261 lb 12.8 oz)   SpO2 97%   BMI 42.26 kg/m²     Result Review :                Physical Exam  Vitals reviewed. Exam conducted with a chaperone present. "   Constitutional:       Appearance: Normal appearance. She is well-developed.   HENT:      Head: Normocephalic and atraumatic.   Eyes:      General:         Right eye: No discharge.         Left eye: No discharge.   Cardiovascular:      Rate and Rhythm: Normal rate and regular rhythm.      Heart sounds: Normal heart sounds. No murmur heard.  No friction rub. No gallop.    Pulmonary:      Effort: Pulmonary effort is normal. No respiratory distress.      Breath sounds: Normal breath sounds. No wheezing or rales.   Genitourinary:     Labia:         Right: No rash or tenderness.         Left: No rash or tenderness.       Vagina: Vaginal discharge present.      Cervix: Normal.      Uterus: Normal.       Adnexa: Right adnexa normal and left adnexa normal.   Skin:     General: Skin is warm and dry.      Findings: No rash.   Neurological:      Mental Status: She is alert and oriented to person, place, and time.      Coordination: Coordination normal.      Gait: Gait normal.   Psychiatric:         Behavior: Behavior is cooperative.         Assessment and Plan:  Problems Addressed this Visit        Gastrointestinal Abdominal     Pelvic pain - Primary     Diagnosis, treatment and and course discussed. Potential side effects discussed. Return if there is worsening or persistence of symptoms.            Relevant Medications    metroNIDAZOLE (METROGEL VAGINAL) 0.75 % vaginal gel    Other Relevant Orders    NuSwab VG+ - Swab, Vagina (Completed)    US Pelvis Transvaginal Non OB      Diagnoses       Codes Comments    Pelvic pain    -  Primary ICD-10-CM: R10.2  ICD-9-CM: [id number]            An After Visit Summary and PPPS were given to the patient.       I wore protective equipment throughout this patient encounter to include mask and eye protection. Hand hygiene was performed before donning protective equipment and after removal when leaving the room.

## 2021-10-11 NOTE — TELEPHONE ENCOUNTER
Patient came in today for a urine sample check she said that she is still having frequency and pain mainly in her kidney area. Also cultured urine

## 2021-10-12 ENCOUNTER — OFFICE VISIT (OUTPATIENT)
Dept: FAMILY MEDICINE CLINIC | Facility: CLINIC | Age: 46
End: 2021-10-12

## 2021-10-12 VITALS
DIASTOLIC BLOOD PRESSURE: 86 MMHG | HEART RATE: 105 BPM | HEIGHT: 66 IN | WEIGHT: 261.8 LBS | OXYGEN SATURATION: 97 % | SYSTOLIC BLOOD PRESSURE: 126 MMHG | TEMPERATURE: 98.9 F | RESPIRATION RATE: 18 BRPM | BODY MASS INDEX: 42.07 KG/M2

## 2021-10-12 DIAGNOSIS — R10.2 PELVIC PAIN: Primary | ICD-10-CM

## 2021-10-12 PROCEDURE — 99214 OFFICE O/P EST MOD 30 MIN: CPT | Performed by: FAMILY MEDICINE

## 2021-10-12 RX ORDER — METRONIDAZOLE 7.5 MG/G
GEL VAGINAL 2 TIMES DAILY
Qty: 70 G | Refills: 0 | Status: SHIPPED | OUTPATIENT
Start: 2021-10-12 | End: 2021-11-01

## 2021-10-13 ENCOUNTER — TELEPHONE (OUTPATIENT)
Dept: FAMILY MEDICINE CLINIC | Facility: CLINIC | Age: 46
End: 2021-10-13

## 2021-10-13 NOTE — TELEPHONE ENCOUNTER
Patient received a call yesterday from someone trying to schedule her mammo. States she is confused because she already has mammo scheduled. She thought she was suppose to be getting an US done. Please clarify with patient if she is suppose to be scheduling an US.

## 2021-10-14 ENCOUNTER — TELEPHONE (OUTPATIENT)
Dept: FAMILY MEDICINE CLINIC | Facility: CLINIC | Age: 46
End: 2021-10-14

## 2021-10-14 LAB
A VAGINAE DNA VAG QL NAA+PROBE: NORMAL SCORE
BVAB2 DNA VAG QL NAA+PROBE: NORMAL SCORE
C ALBICANS DNA VAG QL NAA+PROBE: NEGATIVE
C GLABRATA DNA VAG QL NAA+PROBE: NEGATIVE
C TRACH DNA VAG QL NAA+PROBE: NEGATIVE
MEGA1 DNA VAG QL NAA+PROBE: NORMAL SCORE
N GONORRHOEA DNA VAG QL NAA+PROBE: NEGATIVE
T VAGINALIS DNA VAG QL NAA+PROBE: NEGATIVE

## 2021-10-14 NOTE — TELEPHONE ENCOUNTER
----- Message from Mela Casey MD sent at 10/14/2021  8:36 AM EDT -----  Vagnosis panel is also negative

## 2021-10-21 ENCOUNTER — HOSPITAL ENCOUNTER (OUTPATIENT)
Dept: ULTRASOUND IMAGING | Facility: HOSPITAL | Age: 46
Discharge: HOME | End: 2021-10-21
Admitting: FAMILY MEDICINE

## 2021-10-21 DIAGNOSIS — R10.2 PELVIC PAIN: ICD-10-CM

## 2021-10-21 PROCEDURE — 76856 US EXAM PELVIC COMPLETE: CPT

## 2021-10-21 PROCEDURE — 76830 TRANSVAGINAL US NON-OB: CPT

## 2021-10-26 ENCOUNTER — TELEPHONE (OUTPATIENT)
Dept: FAMILY MEDICINE CLINIC | Facility: CLINIC | Age: 46
End: 2021-10-26

## 2021-10-26 NOTE — TELEPHONE ENCOUNTER
----- Message from Mela Casey MD sent at 10/22/2021 10:01 AM EDT -----  U/s is ok except for the cyst. I want to see her next week please

## 2021-11-01 ENCOUNTER — OFFICE VISIT (OUTPATIENT)
Dept: FAMILY MEDICINE CLINIC | Facility: CLINIC | Age: 46
End: 2021-11-01

## 2021-11-01 VITALS
HEIGHT: 66 IN | DIASTOLIC BLOOD PRESSURE: 82 MMHG | SYSTOLIC BLOOD PRESSURE: 124 MMHG | RESPIRATION RATE: 18 BRPM | TEMPERATURE: 98 F | BODY MASS INDEX: 42.27 KG/M2 | WEIGHT: 263 LBS | HEART RATE: 98 BPM | OXYGEN SATURATION: 98 %

## 2021-11-01 DIAGNOSIS — R10.2 PELVIC PAIN: Primary | ICD-10-CM

## 2021-11-01 DIAGNOSIS — N83.201 RIGHT OVARIAN CYST: ICD-10-CM

## 2021-11-01 DIAGNOSIS — E66.01 CLASS 3 SEVERE OBESITY DUE TO EXCESS CALORIES WITHOUT SERIOUS COMORBIDITY WITH BODY MASS INDEX (BMI) OF 40.0 TO 44.9 IN ADULT (HCC): ICD-10-CM

## 2021-11-01 DIAGNOSIS — N39.3 STRESS INCONTINENCE: ICD-10-CM

## 2021-11-01 PROBLEM — E66.813 CLASS 3 SEVERE OBESITY DUE TO EXCESS CALORIES WITHOUT SERIOUS COMORBIDITY WITH BODY MASS INDEX (BMI) OF 40.0 TO 44.9 IN ADULT: Status: ACTIVE | Noted: 2021-11-01

## 2021-11-01 PROCEDURE — 99213 OFFICE O/P EST LOW 20 MIN: CPT | Performed by: FAMILY MEDICINE

## 2021-11-01 NOTE — PROGRESS NOTES
Subjective   Alexus Serrano is a 45 y.o. female. Presents to [facility]    Chief Complaint   Patient presents with   • Pelvic Pain       US came back and patient is here to follow up on her Us      Flank Pain  This is a new problem. The current episode started in the past 7 days. The problem occurs daily. The problem has been gradually worsening since onset. The pain is present in the gluteal. The quality of the pain is described as burning. Pertinent negatives include no abdominal pain, bladder incontinence, bowel incontinence, chest pain, dysuria, fever, headaches, leg pain, pelvic pain, tingling or weakness. (Pressure to urinate  Worse at night  Frequency  ) Treatments tried: bactrim, vaignal gel.      Culture was negative. She is still having pressure and urge to urinate. She drinks one cup of coffee a day. U/S was negative except for an ovarian cyst.      I personally reviewed and updated the patient's allergies, medications, problem list, and past medical, surgical, social, and family history. I have reviewed and confirmed the accuracy of the History of Present Illness and Review of Symptoms as documented by the MA/LPN/RN. Mela Casey MD    Allergies:  No Known Allergies    Social History:  Social History     Socioeconomic History   • Marital status: Single   Tobacco Use   • Smoking status: Current Every Day Smoker     Start date: 8/2/2021   • Tobacco comment: vapes    Vaping Use   • Vaping Use: Every day   • Substances: Nicotine, Flavoring   • Devices: Pre-filled or refillable cartridge   Substance and Sexual Activity   • Alcohol use: Not Currently   • Drug use: Not Currently     Types: Methamphetamines     Comment: clean for 27 months    • Sexual activity: Not Currently       Family History:  Family History   Problem Relation Age of Onset   • Cancer Mother 61        lung   • Colon cancer Maternal Grandmother 62   • Breast cancer Other         paternal aunt and cousin       Past  "Medical History :  Patient Active Problem List   Diagnosis   • Fibromyalgia   • DDD (degenerative disc disease), lumbar   • Premenstrual dysphoric syndrome   • Essential hypertension   • Anxiety   • Mild episode of recurrent major depressive disorder (HCC)   • GERD (gastroesophageal reflux disease)   • Snoring   • Pelvic pain   • Right ovarian cyst   • Class 3 severe obesity due to excess calories without serious comorbidity with body mass index (BMI) of 40.0 to 44.9 in adult (HCC)   • Stress incontinence       Medication List:    Current Outpatient Medications:   •  FLUoxetine (PROzac) 20 MG capsule, Take 1 capsule by mouth Daily., Disp: 30 capsule, Rfl: 12  •  meloxicam (MOBIC) 15 MG tablet, Take 1 tablet by mouth Daily., Disp: 30 tablet, Rfl: 12  •  omeprazole (priLOSEC) 20 MG capsule, Take 1 capsule by mouth Daily., Disp: 30 capsule, Rfl: 4    Past Surgical History:  No past surgical history on file.    Review of Systems:  Review of Systems   Constitutional: Negative for activity change and fever.   HENT: Negative for ear pain, rhinorrhea, sinus pressure and voice change.    Eyes: Negative for visual disturbance.   Respiratory: Negative for cough and shortness of breath.    Cardiovascular: Negative for chest pain.   Gastrointestinal: Negative for abdominal pain, bowel incontinence, diarrhea, nausea and vomiting.   Endocrine: Negative for cold intolerance and heat intolerance.   Genitourinary: Positive for flank pain. Negative for dysuria, frequency, pelvic pain, urgency and urinary incontinence.   Musculoskeletal: Negative for arthralgias.   Skin: Negative for rash.   Neurological: Negative for tingling, syncope and weakness.   Hematological: Does not bruise/bleed easily.   Psychiatric/Behavioral: Negative for depressed mood. The patient is not nervous/anxious.        Physical Exam:  Vital Signs:  Vital Signs:   /82   Pulse 98   Temp 98 °F (36.7 °C)   Resp 18   Ht 167.6 cm (66\")   Wt 119 kg (263 lb)  "  SpO2 98%   BMI 42.45 kg/m²     Result Review :     US Non-ob Transvaginal    Result Date: 10/21/2021  1. 3.9 cm right ovarian cyst. 2. No evidence of ovarian torsion. 3. Normal appearance of the uterus and endometrium.  Electronically Signed By-Anitha Peterson MD On:10/21/2021 4:18 PM This report was finalized on [id number] by  Anitha Peterson MD.    US Pelvis Complete    Result Date: 10/21/2021  1. 3.9 cm right ovarian cyst. 2. No evidence of ovarian torsion. 3. Normal appearance of the uterus and endometrium.  Electronically Signed By-Anitha Peterson MD On:10/21/2021 4:18 PM This report was finalized on [id number] by  Anitha Peterson MD.               Physical Exam  Vitals reviewed. Exam conducted with a chaperone present.   Constitutional:       Appearance: Normal appearance. She is well-developed.   HENT:      Head: Normocephalic and atraumatic.   Eyes:      General:         Right eye: No discharge.         Left eye: No discharge.   Cardiovascular:      Rate and Rhythm: Normal rate and regular rhythm.      Heart sounds: Normal heart sounds. No murmur heard.  No friction rub. No gallop.    Pulmonary:      Effort: Pulmonary effort is normal. No respiratory distress.      Breath sounds: Normal breath sounds. No wheezing or rales.   Genitourinary:     Labia:         Right: No rash or tenderness.         Left: No rash or tenderness.       Vagina: Vaginal discharge present.      Cervix: Normal.      Uterus: Normal.       Adnexa: Right adnexa normal and left adnexa normal.   Skin:     General: Skin is warm and dry.      Findings: No rash.   Neurological:      Mental Status: She is alert and oriented to person, place, and time.      Coordination: Coordination normal.      Gait: Gait normal.   Psychiatric:         Behavior: Behavior is cooperative.         Assessment and Plan:  Problems Addressed this Visit        Endocrine and Metabolic    Class 3 severe obesity due to excess calories without serious comorbidity  with body mass index (BMI) of 40.0 to 44.9 in adult ([facility])     Patient's (Body mass index is 42.45 kg/m².) indicates that they are obese (BMI >30) with health conditions that include none . Weight is worsening. BMI is is above average; BMI management plan is completed. We discussed low calorie, low carb based diet program, portion control and increasing exercise.          Relevant Orders    Ambulatory Referral to Bariatric Surgery       Gastrointestinal Abdominal     Pelvic pain - Primary     Will refer to PT for pelvic floor dysfunction            Genitourinary and Reproductive     Right ovarian cyst     3.5cm. Will repeat u/s in 6 weeks to see if there are any changes         Relevant Orders    US Pelvis Transvaginal Non OB    Stress incontinence     Will have her go to PT           Diagnoses       Codes Comments    Pelvic pain    -  Primary ICD-10-CM: R10.2  ICD-9-CM: [id number]     Right ovarian cyst     ICD-10-CM: N83.201  ICD-9-CM: 620.2     Class 3 severe obesity due to excess calories without serious comorbidity with body mass index (BMI) of 40.0 to 44.9 in adult ([facility])     ICD-10-CM: E66.01, Z68.41  ICD-9-CM: 278.01, V85.41     Stress incontinence     ICD-10-CM: N39.3  ICD-9-CM: [id number]            An After Visit Summary and PPPS were given to the patient.       I wore protective equipment throughout this patient encounter to include mask and eye protection. Hand hygiene was performed before donning protective equipment and after removal when leaving the room.

## 2021-11-02 ENCOUNTER — TELEPHONE (OUTPATIENT)
Dept: FAMILY MEDICINE CLINIC | Facility: CLINIC | Age: 46
End: 2021-11-02

## 2021-11-02 DIAGNOSIS — R10.2 PELVIC PAIN: Primary | ICD-10-CM

## 2021-11-02 DIAGNOSIS — N83.201 RIGHT OVARIAN CYST: ICD-10-CM

## 2021-11-04 NOTE — ASSESSMENT & PLAN NOTE
Patient's (Body mass index is 42.45 kg/m².) indicates that they are obese (BMI >30) with health conditions that include none . Weight is worsening. BMI is is above average; BMI management plan is completed. We discussed low calorie, low carb based diet program, portion control and increasing exercise.

## 2021-11-23 ENCOUNTER — TELEPHONE (OUTPATIENT)
Dept: FAMILY MEDICINE CLINIC | Facility: CLINIC | Age: 46
End: 2021-11-23

## 2021-11-23 DIAGNOSIS — R10.2 PELVIC PAIN: Primary | ICD-10-CM

## 2021-11-23 DIAGNOSIS — N39.3 STRESS INCONTINENCE: ICD-10-CM

## 2021-12-13 ENCOUNTER — TELEMEDICINE (OUTPATIENT)
Dept: FAMILY MEDICINE CLINIC | Facility: TELEHEALTH | Age: 46
End: 2021-12-13

## 2021-12-13 DIAGNOSIS — J01.10 ACUTE FRONTAL SINUSITIS, RECURRENCE NOT SPECIFIED: Primary | ICD-10-CM

## 2021-12-13 PROCEDURE — 99213 OFFICE O/P EST LOW 20 MIN: CPT | Performed by: NURSE PRACTITIONER

## 2021-12-13 RX ORDER — FLUCONAZOLE 150 MG/1
TABLET ORAL
Qty: 2 TABLET | Refills: 0 | Status: SHIPPED | OUTPATIENT
Start: 2021-12-13 | End: 2022-01-25 | Stop reason: SDUPTHER

## 2021-12-13 RX ORDER — METHYLPREDNISOLONE 4 MG/1
TABLET ORAL
Qty: 21 TABLET | Refills: 0 | Status: SHIPPED | OUTPATIENT
Start: 2021-12-13 | End: 2022-04-05

## 2021-12-13 RX ORDER — AMOXICILLIN AND CLAVULANATE POTASSIUM 875; 125 MG/1; MG/1
1 TABLET, FILM COATED ORAL 2 TIMES DAILY
Qty: 20 TABLET | Refills: 0 | Status: SHIPPED | OUTPATIENT
Start: 2021-12-13 | End: 2021-12-23

## 2021-12-13 RX ORDER — BROMPHENIRAMINE MALEATE, PSEUDOEPHEDRINE HYDROCHLORIDE, AND DEXTROMETHORPHAN HYDROBROMIDE 2; 30; 10 MG/5ML; MG/5ML; MG/5ML
10 SYRUP ORAL 4 TIMES DAILY PRN
Qty: 200 ML | Refills: 0 | Status: SHIPPED | OUTPATIENT
Start: 2021-12-13 | End: 2022-01-25 | Stop reason: SDUPTHER

## 2021-12-13 NOTE — PATIENT INSTRUCTIONS
Sinusitis, Adult  Sinusitis is inflammation of your sinuses. Sinuses are hollow spaces in the bones around your face. Your sinuses are located:  · Around your eyes.  · In the middle of your forehead.  · Behind your nose.  · In your cheekbones.  Mucus normally drains out of your sinuses. When your nasal tissues become inflamed or swollen, mucus can become trapped or blocked. This allows bacteria, viruses, and fungi to grow, which leads to infection. Most infections of the sinuses are caused by a virus.  Sinusitis can develop quickly. It can last for up to 4 weeks (acute) or for more than 12 weeks (chronic). Sinusitis often develops after a cold.  What are the causes?  This condition is caused by anything that creates swelling in the sinuses or stops mucus from draining. This includes:  · Allergies.  · Asthma.  · Infection from bacteria or viruses.  · Deformities or blockages in your nose or sinuses.  · Abnormal growths in the nose (nasal polyps).  · Pollutants, such as chemicals or irritants in the air.  · Infection from fungi (rare).  What increases the risk?  You are more likely to develop this condition if you:  · Have a weak body defense system (immune system).  · Do a lot of swimming or diving.  · Overuse nasal sprays.  · Smoke.  What are the signs or symptoms?  The main symptoms of this condition are pain and a feeling of pressure around the affected sinuses. Other symptoms include:  · Stuffy nose or congestion.  · Thick drainage from your nose.  · Swelling and warmth over the affected sinuses.  · Headache.  · Upper toothache.  · A cough that may get worse at night.  · Extra mucus that collects in the throat or the back of the nose (postnasal drip).  · Decreased sense of smell and taste.  · Fatigue.  · A fever.  · Sore throat.  · Bad breath.  How is this diagnosed?  This condition is diagnosed based on:  · Your symptoms.  · Your medical history.  · A physical exam.  · Tests to find out if your condition is  acute or chronic. This may include:  ? Checking your nose for nasal polyps.  ? Viewing your sinuses using a device that has a light (endoscope).  ? Testing for allergies or bacteria.  ? Imaging tests, such as an MRI or CT scan.  In rare cases, a bone biopsy may be done to rule out more serious types of fungal sinus disease.  How is this treated?  Treatment for sinusitis depends on the cause and whether your condition is chronic or acute.  · If caused by a virus, your symptoms should go away on their own within 10 days. You may be given medicines to relieve symptoms. They include:  ? Medicines that shrink swollen nasal passages (topical intranasal decongestants).  ? Medicines that treat allergies (antihistamines).  ? A spray that eases inflammation of the nostrils (topical intranasal corticosteroids).  ? Rinses that help get rid of thick mucus in your nose (nasal saline washes).  · If caused by bacteria, your health care provider may recommend waiting to see if your symptoms improve. Most bacterial infections will get better without antibiotic medicine. You may be given antibiotics if you have:  ? A severe infection.  ? A weak immune system.  · If caused by narrow nasal passages or nasal polyps, you may need to have surgery.  Follow these instructions at home:  Medicines  · Take, use, or apply over-the-counter and prescription medicines only as told by your health care provider. These may include nasal sprays.  · If you were prescribed an antibiotic medicine, take it as told by your health care provider. Do not stop taking the antibiotic even if you start to feel better.  Hydrate and humidify    · Drink enough fluid to keep your urine pale yellow. Staying hydrated will help to thin your mucus.  · Use a cool mist humidifier to keep the humidity level in your home above 50%.  · Inhale steam for 10-15 minutes, 3-4 times a day, or as told by your health care provider. You can do this in the bathroom while a hot shower is  running.  · Limit your exposure to cool or dry air.    Rest  · Rest as much as possible.  · Sleep with your head raised (elevated).  · Make sure you get enough sleep each night.  General instructions    · Apply a warm, moist washcloth to your face 3-4 times a day or as told by your health care provider. This will help with discomfort.  · Wash your hands often with soap and water to reduce your exposure to germs. If soap and water are not available, use hand .  · Do not smoke. Avoid being around people who are smoking (secondhand smoke).  · Keep all follow-up visits as told by your health care provider. This is important.    Contact a health care provider if:  · You have a fever.  · Your symptoms get worse.  · Your symptoms do not improve within 10 days.  Get help right away if:  · You have a severe headache.  · You have persistent vomiting.  · You have severe pain or swelling around your face or eyes.  · You have vision problems.  · You develop confusion.  · Your neck is stiff.  · You have trouble breathing.  Summary  · Sinusitis is soreness and inflammation of your sinuses. Sinuses are hollow spaces in the bones around your face.  · This condition is caused by nasal tissues that become inflamed or swollen. The swelling traps or blocks the flow of mucus. This allows bacteria, viruses, and fungi to grow, which leads to infection.  · If you were prescribed an antibiotic medicine, take it as told by your health care provider. Do not stop taking the antibiotic even if you start to feel better.  · Keep all follow-up visits as told by your health care provider. This is important.  This information is not intended to replace advice given to you by your health care provider. Make sure you discuss any questions you have with your health care provider.  Document Revised: 05/20/2019 Document Reviewed: 05/20/2019  [employer] Patient Education © 2021 [employer]

## 2021-12-13 NOTE — PROGRESS NOTES
You have chosen to receive care through a telehealth visit.  Do you consent to use a video/audio connection for your medical care today? Yes     CHIEF COMPLAINT  Chief Complaint   Patient presents with   • Cough         HPI  Alexus Serrano is a 46 y.o. female  presents with complaint of last week began having chest congestion, nasal congestion with no discharge, occ runny nose, ears are popping, headache, sore throat in mornings, PND, deep occasional cough with green sputum.  Denies fever, shortness of breath, wheezing.     COVID-19 test (at work) was negative    Review of Systems   Constitutional: Positive for fatigue. Negative for fever.   HENT: Positive for congestion, postnasal drip, sinus pressure, sinus pain and sore throat. Negative for ear pain.    Respiratory: Positive for cough. Negative for chest tightness, shortness of breath and wheezing.    Neurological: Positive for headaches. Negative for dizziness.   All other systems reviewed and are negative.      No past medical history on file.    Family History   Problem Relation Age of Onset   • Cancer Mother 61        lung   • Colon cancer Maternal Grandmother 62   • Breast cancer Other         paternal aunt and cousin       Social History     Socioeconomic History   • Marital status: Single   Tobacco Use   • Smoking status: Current Every Day Smoker     Start date: 8/2/2021   • Tobacco comment: vapes    Vaping Use   • Vaping Use: Every day   • Substances: Nicotine, Flavoring   • Devices: Pre-filled or refillable cartridge   Substance and Sexual Activity   • Alcohol use: Not Currently   • Drug use: Not Currently     Types: Methamphetamines     Comment: clean for 27 months    • Sexual activity: Not Currently         There were no vitals taken for this visit.    PHYSICAL EXAM  Physical Exam   Constitutional: She is oriented to person, place, and time. She appears well-developed and well-nourished. She does not have a sickly appearance. She does not appear  ill.   HENT:   Head: Normocephalic and atraumatic.   Bilateral frontal sinus tenderness   Pulmonary/Chest: Effort normal.  No respiratory distress.  Neurological: She is alert and oriented to person, place, and time.         Diagnoses and all orders for this visit:    1. Acute frontal sinusitis, recurrence not specified (Primary)  -     amoxicillin-clavulanate (AUGMENTIN) 875-125 MG per tablet; Take 1 tablet by mouth 2 (Two) Times a Day for 10 days.  Dispense: 20 tablet; Refill: 0  -     fluconazole (DIFLUCAN) 150 MG tablet; Take 1 tablet now, repeat in 72 hours if symptoms continue  Dispense: 2 tablet; Refill: 0  -     methylPREDNISolone (MEDROL) 4 MG dose pack; Take as directed on package instructions.  Dispense: 21 tablet; Refill: 0  -     brompheniramine-pseudoephedrine-DM 30-2-10 MG/5ML syrup; Take 10 mL by mouth 4 (Four) Times a Day As Needed for Congestion, Cough or Allergies.  Dispense: 200 mL; Refill: 0    --take medications as prescribed  --increase fluids, rest as needed, tylenol or ibuprofen for pain/fever  --if no improvement in 5-7 days will need follow-up for further evaluation      FOLLOW-UP  As discussed during visit with PCP/[facility] if no improvement or Urgent Care/Emergency Department if worsening of symptoms    Patient verbalizes understanding of medication dosage, comfort measures, instructions for treatment and follow-up.    Anitha Goss, OBDULIA  12/13/2021  08:37 EST    This visit was performed via Telehealth.  This patient has been instructed to follow-up with their primary care provider if their symptoms worsen or the treatment provided does not resolve their illness.

## 2022-01-25 ENCOUNTER — TELEMEDICINE (OUTPATIENT)
Dept: FAMILY MEDICINE CLINIC | Facility: TELEHEALTH | Age: 47
End: 2022-01-25

## 2022-01-25 DIAGNOSIS — J01.40 ACUTE PANSINUSITIS, RECURRENCE NOT SPECIFIED: Primary | ICD-10-CM

## 2022-01-25 PROCEDURE — 99213 OFFICE O/P EST LOW 20 MIN: CPT | Performed by: NURSE PRACTITIONER

## 2022-01-25 RX ORDER — BROMPHENIRAMINE MALEATE, PSEUDOEPHEDRINE HYDROCHLORIDE, AND DEXTROMETHORPHAN HYDROBROMIDE 2; 30; 10 MG/5ML; MG/5ML; MG/5ML
5 SYRUP ORAL 4 TIMES DAILY PRN
Qty: 118 ML | Refills: 0 | Status: SHIPPED | OUTPATIENT
Start: 2022-01-25 | End: 2022-04-05

## 2022-01-25 RX ORDER — FLUCONAZOLE 150 MG/1
150 TABLET ORAL
Qty: 3 TABLET | Refills: 0 | Status: SHIPPED | OUTPATIENT
Start: 2022-01-25 | End: 2022-02-01

## 2022-01-25 RX ORDER — AMOXICILLIN AND CLAVULANATE POTASSIUM 875; 125 MG/1; MG/1
1 TABLET, FILM COATED ORAL 2 TIMES DAILY
Qty: 20 TABLET | Refills: 0 | Status: SHIPPED | OUTPATIENT
Start: 2022-01-25 | End: 2022-02-04

## 2022-01-25 NOTE — PATIENT INSTRUCTIONS
Drink plenty of water  Over the counter pain relievers okay   If symptoms do not improve in 3-5 days follow up with your primary care provider or urgent care      Sinusitis, Adult  Sinusitis is soreness and swelling (inflammation) of your sinuses. Sinuses are hollow spaces in the bones around your face. They are located:  · Around your eyes.  · In the middle of your forehead.  · Behind your nose.  · In your cheekbones.  Your sinuses and nasal passages are lined with a fluid called mucus. Mucus drains out of your sinuses. Swelling can trap mucus in your sinuses. This lets germs (bacteria, virus, or fungus) grow, which leads to infection. Most of the time, this condition is caused by a virus.  What are the causes?  This condition is caused by:  · Allergies.  · Asthma.  · Germs.  · Things that block your nose or sinuses.  · Growths in the nose (nasal polyps).  · Chemicals or irritants in the air.  · Fungus (rare).  What increases the risk?  You are more likely to develop this condition if:  · You have a weak body defense system (immune system).  · You do a lot of swimming or diving.  · You use nasal sprays too much.  · You smoke.  What are the signs or symptoms?  The main symptoms of this condition are pain and a feeling of pressure around the sinuses. Other symptoms include:  · Stuffy nose (congestion).  · Runny nose (drainage).  · Swelling and warmth in the sinuses.  · Headache.  · Toothache.  · A cough that may get worse at night.  · Mucus that collects in the throat or the back of the nose (postnasal drip).  · Being unable to smell and taste.  · Being very tired (fatigue).  · A fever.  · Sore throat.  · Bad breath.  How is this diagnosed?  This condition is diagnosed based on:  · Your symptoms.  · Your medical history.  · A physical exam.  · Tests to find out if your condition is short-term (acute) or long-term (chronic). Your doctor may:  ? Check your nose for growths (polyps).  ? Check your sinuses using a tool  that has a light (endoscope).  ? Check for allergies or germs.  ? Do imaging tests, such as an MRI or CT scan.  How is this treated?  Treatment for this condition depends on the cause and whether it is short-term or long-term.  · If caused by a virus, your symptoms should go away on their own within 10 days. You may be given medicines to relieve symptoms. They include:  ? Medicines that shrink swollen tissue in the nose.  ? Medicines that treat allergies (antihistamines).  ? A spray that treats swelling of the nostrils.   ? Rinses that help get rid of thick mucus in your nose (nasal saline washes).  · If caused by bacteria, your doctor may wait to see if you will get better without treatment. You may be given antibiotic medicine if you have:  ? A very bad infection.  ? A weak body defense system.  · If caused by growths in the nose, you may need to have surgery.  Follow these instructions at home:  Medicines  · Take, use, or apply over-the-counter and prescription medicines only as told by your doctor. These may include nasal sprays.  · If you were prescribed an antibiotic medicine, take it as told by your doctor. Do not stop taking the antibiotic even if you start to feel better.  Hydrate and humidify    · Drink enough water to keep your pee (urine) pale yellow.  · Use a cool mist humidifier to keep the humidity level in your home above 50%.  · Breathe in steam for 10-15 minutes, 3-4 times a day, or as told by your doctor. You can do this in the bathroom while a hot shower is running.  · Try not to spend time in cool or dry air.    Rest  · Rest as much as you can.  · Sleep with your head raised (elevated).  · Make sure you get enough sleep each night.  General instructions    · Put a warm, moist washcloth on your face 3-4 times a day, or as often as told by your doctor. This will help with discomfort.  · Wash your hands often with soap and water. If there is no soap and water, use hand .  · Do not smoke.  Avoid being around people who are smoking (secondhand smoke).  · Keep all follow-up visits as told by your doctor. This is important.    Contact a doctor if:  · You have a fever.  · Your symptoms get worse.  · Your symptoms do not get better within 10 days.  Get help right away if:  · You have a very bad headache.  · You cannot stop throwing up (vomiting).  · You have very bad pain or swelling around your face or eyes.  · You have trouble seeing.  · You feel confused.  · Your neck is stiff.  · You have trouble breathing.  Summary  · Sinusitis is swelling of your sinuses. Sinuses are hollow spaces in the bones around your face.  · This condition is caused by tissues in your nose that become inflamed or swollen. This traps germs. These can lead to infection.  · If you were prescribed an antibiotic medicine, take it as told by your doctor. Do not stop taking it even if you start to feel better.  · Keep all follow-up visits as told by your doctor. This is important.  This information is not intended to replace advice given to you by your health care provider. Make sure you discuss any questions you have with your health care provider.  Document Revised: 05/20/2019 Document Reviewed: 05/20/2019  [employer] Patient Education © 2021 [employer]

## 2022-01-25 NOTE — PROGRESS NOTES
You have chosen to receive care through a telehealth visit.  Do you consent to use a video/audio connection for your medical care today? Yes     CHIEF COMPLAINT  Chief Complaint   Patient presents with   • Sinusitis         HPI  Alexus Serrano is a 46 y.o. female  presents with complaint of sinusitis. She has symptoms last month and treatment resolved them, but started having symptoms again last Thursday.     Review of Systems   Constitutional: Negative for chills, diaphoresis, fatigue and fever.   HENT: Positive for congestion, postnasal drip, rhinorrhea, sinus pressure and sinus pain. Negative for sneezing and sore throat.    Respiratory: Positive for cough (due to sinus drainage). Negative for chest tightness, shortness of breath and wheezing.    Cardiovascular: Negative for chest pain.   Gastrointestinal: Negative for diarrhea, nausea and vomiting.   Musculoskeletal: Negative for myalgias.   Neurological: Positive for headaches.   Hematological: Negative for adenopathy.       No past medical history on file.    Family History   Problem Relation Age of Onset   • Cancer Mother 61        lung   • Colon cancer Maternal Grandmother 62   • Breast cancer Other         paternal aunt and cousin       Social History     Socioeconomic History   • Marital status: Single   Tobacco Use   • Smoking status: Current Every Day Smoker     Start date: 8/2/2021   • Tobacco comment: vapes    Vaping Use   • Vaping Use: Every day   • Substances: Nicotine, Flavoring   • Devices: Pre-filled or refillable cartridge   Substance and Sexual Activity   • Alcohol use: Not Currently   • Drug use: Not Currently     Types: Methamphetamines     Comment: clean for 27 months    • Sexual activity: Not Currently         There were no vitals taken for this visit.    PHYSICAL EXAM  Physical Exam   Constitutional: She is oriented to person, place, and time. She appears well-developed and well-nourished. She does not have a sickly appearance. She does  not appear ill. No distress.   HENT:   Head: Normocephalic and atraumatic.   Eyes: EOM are normal.   Neck: Neck normal appearance.  Pulmonary/Chest: Effort normal.  No respiratory distress.  Neurological: She is alert and oriented to person, place, and time.   Skin: Skin is dry.   Psychiatric: She has a normal mood and affect.         Diagnoses and all orders for this visit:    1. Acute pansinusitis, recurrence not specified (Primary)    Other orders  -     amoxicillin-clavulanate (Augmentin) 875-125 MG per tablet; Take 1 tablet by mouth 2 (Two) Times a Day for 10 days.  Dispense: 20 tablet; Refill: 0  -     brompheniramine-pseudoephedrine-DM 30-2-10 MG/5ML syrup; Take 5 mL by mouth 4 (Four) Times a Day As Needed for Allergies.  Dispense: 118 mL; Refill: 0  -     fluconazole (Diflucan) 150 MG tablet; Take 1 tablet by mouth Every 3 (Three) Days for 3 doses.  Dispense: 3 tablet; Refill: 0        COVID-19 (MODERNA) 1st, 2nd, 3rd Dose Only 2/5/2021, 1/8/2021   Test for COVID-19 pending.     FOLLOW-UP  As discussed during visit with PCP/[facility] if no improvement or Urgent Care/Emergency Department if worsening of symptoms    Patient verbalizes understanding of medication dosage, comfort measures, instructions for treatment and follow-up.    OBDULIA Walton  01/25/2022  11:13 EST    This visit was performed via Telehealth.  This patient has been instructed to follow-up with their primary care provider if their symptoms worsen or the treatment provided does not resolve their illness.

## 2022-04-05 ENCOUNTER — OFFICE VISIT (OUTPATIENT)
Dept: FAMILY MEDICINE CLINIC | Facility: CLINIC | Age: 47
End: 2022-04-05

## 2022-04-05 VITALS
SYSTOLIC BLOOD PRESSURE: 138 MMHG | BODY MASS INDEX: 42.75 KG/M2 | HEIGHT: 66 IN | HEART RATE: 92 BPM | TEMPERATURE: 97.3 F | WEIGHT: 266 LBS | RESPIRATION RATE: 18 BRPM | DIASTOLIC BLOOD PRESSURE: 80 MMHG | OXYGEN SATURATION: 95 %

## 2022-04-05 DIAGNOSIS — F41.9 ANXIETY: Primary | ICD-10-CM

## 2022-04-05 DIAGNOSIS — R21 RASH: ICD-10-CM

## 2022-04-05 DIAGNOSIS — F33.0 MILD EPISODE OF RECURRENT MAJOR DEPRESSIVE DISORDER: ICD-10-CM

## 2022-04-05 PROBLEM — R10.2 PELVIC PAIN: Status: RESOLVED | Noted: 2021-10-12 | Resolved: 2022-04-05

## 2022-04-05 PROCEDURE — 99214 OFFICE O/P EST MOD 30 MIN: CPT | Performed by: FAMILY MEDICINE

## 2022-04-05 RX ORDER — FLUOXETINE HYDROCHLORIDE 40 MG/1
40 CAPSULE ORAL DAILY
Qty: 30 CAPSULE | Refills: 12 | Status: SHIPPED | OUTPATIENT
Start: 2022-04-05

## 2022-04-05 RX ORDER — CEPHALEXIN 500 MG/1
500 CAPSULE ORAL 3 TIMES DAILY
Qty: 30 CAPSULE | Refills: 0 | Status: SHIPPED | OUTPATIENT
Start: 2022-04-05 | End: 2022-04-19

## 2022-04-05 NOTE — PROGRESS NOTES
Subjective   Alexus Serrano is a 46 y.o. female. Presents to [facility]    Chief Complaint   Patient presents with   • Rash       4/5/22: Pt. Is also here for med check on her Prozac, She stated that she feels like it isn't helping like it use to.    Rash  This is a new problem. The current episode started more than 1 month ago. The problem has been gradually worsening since onset. The affected locations include the left arm. The rash is characterized by itchiness, redness and draining. Associated with: started when she got her new tattoo.  Pertinent negatives include no cough, diarrhea, fever, joint pain, rhinorrhea, shortness of breath or vomiting. Past treatments include anti-itch cream. The treatment provided no relief.   Anxiety  Presents for follow-up visit. Symptoms include nervous/anxious behavior. Patient reports no chest pain, depressed mood, nausea, shortness of breath or suicidal ideas. The severity of symptoms is moderate.     Her past medical history is significant for depression.   Depression  Visit Type: follow-up  Patient presents with the following symptoms: nervousness/anxiety.  Patient is not experiencing: depressed mood, feelings of hopelessness, feelings of worthlessness, shortness of breath, suicidal ideas and suicidal planning.  Frequency of symptoms: most days   Severity: moderate          I personally reviewed and updated the patient's allergies, medications, problem list, and past medical, surgical, social, and family history. I have reviewed and confirmed the accuracy of the History of Present Illness and Review of Symptoms as documented by the MA/LPN/RN. Mela Casey MD    Allergies:  No Known Allergies    Social History:  Social History     Socioeconomic History   • Marital status: Single   Tobacco Use   • Smoking status: Current Every Day Smoker     Start date: 8/2/2021   • Tobacco comment: vapes    Vaping Use   • Vaping Use: Every day   • Substances: Nicotine,  Flavoring   • Devices: Pre-filled or refillable cartridge   Substance and Sexual Activity   • Alcohol use: Not Currently   • Drug use: Not Currently     Types: Methamphetamines     Comment: clean for 27 months    • Sexual activity: Not Currently       Family History:  Family History   Problem Relation Age of Onset   • Cancer Mother 61        lung   • Colon cancer Maternal Grandmother 62   • Breast cancer Other         paternal aunt and cousin       Past Medical History :  Patient Active Problem List   Diagnosis   • Fibromyalgia   • DDD (degenerative disc disease), lumbar   • Premenstrual dysphoric syndrome   • Essential hypertension   • Anxiety   • Mild episode of recurrent major depressive disorder (HCC)   • GERD (gastroesophageal reflux disease)   • Snoring   • Right ovarian cyst   • Class 3 severe obesity due to excess calories without serious comorbidity with body mass index (BMI) of 40.0 to 44.9 in adult (HCC)   • Stress incontinence   • Rash       Medication List:    Current Outpatient Medications:   •  FLUoxetine (PROzac) 40 MG capsule, Take 1 capsule by mouth Daily., Disp: 30 capsule, Rfl: 12  •  meloxicam (MOBIC) 15 MG tablet, Take 1 tablet by mouth Daily., Disp: 30 tablet, Rfl: 12  •  omeprazole (priLOSEC) 20 MG capsule, Take 1 capsule by mouth Daily., Disp: 30 capsule, Rfl: 4  •  cephalexin (KEFLEX) 500 MG capsule, Take 1 capsule by mouth 3 (Three) Times a Day., Disp: 30 capsule, Rfl: 0    Past Surgical History:  No past surgical history on file.    Review of Systems:  Review of Systems   Constitutional: Negative for activity change and fever.   HENT: Negative for ear pain, rhinorrhea, sinus pressure and voice change.    Eyes: Negative for visual disturbance.   Respiratory: Negative for cough and shortness of breath.    Cardiovascular: Negative for chest pain.   Gastrointestinal: Negative for abdominal pain, diarrhea, nausea and vomiting.   Endocrine: Negative for cold intolerance and heat intolerance.  "  Genitourinary: Negative for frequency and urgency.   Musculoskeletal: Negative for arthralgias and joint pain.   Skin: Positive for rash.   Neurological: Negative for syncope.   Hematological: Does not bruise/bleed easily.   Psychiatric/Behavioral: Negative for suicidal ideas and depressed mood. The patient is nervous/anxious.        Physical Exam:  Vital Signs:  Vital Signs:   /80 (BP Location: Right arm, Patient Position: Sitting, Cuff Size: Large Adult)   Pulse 92   Temp 97.3 °F (36.3 °C) (Temporal)   Resp 18   Ht 167.6 cm (66\")   Wt 121 kg (266 lb)   SpO2 95%   BMI 42.93 kg/m²     Result Review :                Physical Exam  Vitals reviewed.   Constitutional:       Appearance: Normal appearance. She is well-developed.   HENT:      Head: Normocephalic and atraumatic.   Eyes:      General:         Right eye: No discharge.         Left eye: No discharge.   Cardiovascular:      Rate and Rhythm: Normal rate and regular rhythm.      Heart sounds: Normal heart sounds. No murmur heard.    No friction rub. No gallop.   Pulmonary:      Effort: Pulmonary effort is normal. No respiratory distress.      Breath sounds: Normal breath sounds. No wheezing or rales.   Skin:     General: Skin is warm and dry.      Findings: No rash.      Comments: Left forearm with a few erythema with ulceration   Neurological:      Mental Status: She is alert and oriented to person, place, and time.      Coordination: Coordination normal.      Gait: Gait normal.   Psychiatric:         Behavior: Behavior is cooperative.         Assessment and Plan:  Problems Addressed this Visit        Mental Health    Anxiety - Primary    Relevant Medications    FLUoxetine (PROzac) 40 MG capsule    Mild episode of recurrent major depressive disorder (HCC)     Patient's depression is recurrent and is moderate without psychosis. Their depression is currently active and the condition is worsening. This will be reassessed in 4 weeks. F/U as " described:patient was prescribed an antidepressant medicine.           Relevant Medications    FLUoxetine (PROzac) 40 MG capsule       Skin    Rash     Left forearm. Blister that becomes a sore.   Will treat with antibiotic.            Relevant Medications    cephalexin (KEFLEX) 500 MG capsule      Diagnoses       Codes Comments    Anxiety    -  Primary ICD-10-CM: F41.9  ICD-9-CM: 300.00     Mild episode of recurrent major depressive disorder (HCC)     ICD-10-CM: F33.0  ICD-9-CM: 296.31     Rash     ICD-10-CM: R21  ICD-9-CM: 782.1            An After Visit Summary and PPPS were given to the patient.       I wore protective equipment throughout this patient encounter to include mask. Hand hygiene was performed before donning protective equipment and after removal when leaving the room.

## 2022-04-19 ENCOUNTER — OFFICE VISIT (OUTPATIENT)
Dept: FAMILY MEDICINE CLINIC | Facility: CLINIC | Age: 47
End: 2022-04-19

## 2022-04-19 VITALS
OXYGEN SATURATION: 96 % | SYSTOLIC BLOOD PRESSURE: 120 MMHG | HEIGHT: 66 IN | TEMPERATURE: 97.8 F | HEART RATE: 92 BPM | WEIGHT: 260 LBS | RESPIRATION RATE: 18 BRPM | BODY MASS INDEX: 41.78 KG/M2 | DIASTOLIC BLOOD PRESSURE: 84 MMHG

## 2022-04-19 DIAGNOSIS — F41.9 ANXIETY: ICD-10-CM

## 2022-04-19 DIAGNOSIS — R21 RASH: Primary | ICD-10-CM

## 2022-04-19 PROCEDURE — 99212 OFFICE O/P EST SF 10 MIN: CPT | Performed by: FAMILY MEDICINE

## 2022-04-19 NOTE — PROGRESS NOTES
Subjective   Alexus Serrano is a 46 y.o. female. Presents to [facility]    Chief Complaint   Patient presents with   • Rash   • Anxiety       Anxiety  Presents for follow-up visit. Symptoms include insomnia. Patient reports no chest pain, decreased concentration, depressed mood, dizziness, dry mouth, excessive worry, irritability, palpitations, panic, restlessness or shortness of breath. Symptoms occur most days. The severity of symptoms is moderate. The quality of sleep is fair.     Compliance with medications is %.   Rash  This is a new problem. The current episode started more than 1 month ago. The problem has been gradually worsening since onset. The affected locations include the left arm. The rash is characterized by redness. Pertinent negatives include no congestion, cough, diarrhea, fatigue, fever, joint pain, rhinorrhea, shortness of breath, sore throat or vomiting. (No new spots, healing up well  ) Past treatments include anti-itch cream and antibiotics. The treatment provided significant relief.      The prozac is helping some. Not at goal yet. She is doing well.       I personally reviewed and updated the patient's allergies, medications, problem list, and past medical, surgical, social, and family history. I have reviewed and confirmed the accuracy of the History of Present Illness and Review of Symptoms as documented by the MA/LPN/RN. Mela Casey MD    Allergies:  No Known Allergies    Social History:  Social History     Socioeconomic History   • Marital status: Single   Tobacco Use   • Smoking status: Current Every Day Smoker     Start date: 8/2/2021   • Tobacco comment: vapes    Vaping Use   • Vaping Use: Every day   • Substances: Nicotine, Flavoring   • Devices: Pre-filled or refillable cartridge   Substance and Sexual Activity   • Alcohol use: Not Currently   • Drug use: Not Currently     Types: Methamphetamines     Comment: clean for 27 months    • Sexual activity:  "Not Currently       Family History:  Family History   Problem Relation Age of Onset   • Cancer Mother 61        lung   • Colon cancer Maternal Grandmother 62   • Breast cancer Other         paternal aunt and cousin       Past Medical History :  Patient Active Problem List   Diagnosis   • Fibromyalgia   • DDD (degenerative disc disease), lumbar   • Premenstrual dysphoric syndrome   • Essential hypertension   • Anxiety   • Mild episode of recurrent major depressive disorder (HCC)   • GERD (gastroesophageal reflux disease)   • Snoring   • Right ovarian cyst   • Class 3 severe obesity due to excess calories without serious comorbidity with body mass index (BMI) of 40.0 to 44.9 in adult (HCC)   • Stress incontinence       Medication List:    Current Outpatient Medications:   •  FLUoxetine (PROzac) 40 MG capsule, Take 1 capsule by mouth Daily., Disp: 30 capsule, Rfl: 12  •  meloxicam (MOBIC) 15 MG tablet, Take 1 tablet by mouth Daily., Disp: 30 tablet, Rfl: 12  •  omeprazole (priLOSEC) 20 MG capsule, Take 1 capsule by mouth Daily., Disp: 30 capsule, Rfl: 4    Past Surgical History:  No past surgical history on file.    Review of Systems:  Review of Systems   Constitutional: Negative for fatigue, fever and irritability.   HENT: Negative for congestion, rhinorrhea and sore throat.    Respiratory: Negative for cough and shortness of breath.    Cardiovascular: Negative for chest pain and palpitations.   Gastrointestinal: Negative for diarrhea and vomiting.   Musculoskeletal: Negative for joint pain.   Skin: Positive for rash.   Neurological: Negative for dizziness.   Psychiatric/Behavioral: Negative for decreased concentration and depressed mood. The patient has insomnia.        Physical Exam:  Vital Signs:  Vital Signs:   /84   Pulse 92   Temp 97.8 °F (36.6 °C)   Resp 18   Ht 167.6 cm (66\")   Wt 118 kg (260 lb)   SpO2 96%   BMI 41.97 kg/m²     Result Review :                Physical Exam  Vitals reviewed. "   Constitutional:       Appearance: Normal appearance. She is well-developed.   HENT:      Head: Normocephalic and atraumatic.   Eyes:      General:         Right eye: No discharge.         Left eye: No discharge.   Cardiovascular:      Rate and Rhythm: Normal rate and regular rhythm.      Heart sounds: Normal heart sounds. No murmur heard.    No friction rub. No gallop.   Pulmonary:      Effort: Pulmonary effort is normal. No respiratory distress.      Breath sounds: Normal breath sounds. No wheezing or rales.   Skin:     General: Skin is warm and dry.      Findings: No rash.   Neurological:      Mental Status: She is alert and oriented to person, place, and time.      Coordination: Coordination normal.      Gait: Gait normal.   Psychiatric:         Behavior: Behavior is cooperative.         Assessment and Plan:  Problems Addressed this Visit        Mental Health    Anxiety     Stable with prozac              Skin    RESOLVED: Rash - Primary     resolved             Diagnoses       Codes Comments    Rash    -  Primary ICD-10-CM: R21  ICD-9-CM: 782.1     Anxiety     ICD-10-CM: F41.9  ICD-9-CM: 300.00            An After Visit Summary and PPPS were given to the patient.       I wore protective equipment throughout this patient encounter to include mask. Hand hygiene was performed before donning protective equipment and after removal when leaving the room.

## 2022-11-17 ENCOUNTER — OFFICE VISIT (OUTPATIENT)
Dept: FAMILY MEDICINE CLINIC | Facility: CLINIC | Age: 47
End: 2022-11-17

## 2022-11-17 VITALS
OXYGEN SATURATION: 98 % | HEART RATE: 98 BPM | HEIGHT: 66 IN | SYSTOLIC BLOOD PRESSURE: 120 MMHG | RESPIRATION RATE: 18 BRPM | DIASTOLIC BLOOD PRESSURE: 76 MMHG | BODY MASS INDEX: 41.72 KG/M2 | WEIGHT: 259.6 LBS | TEMPERATURE: 97.8 F

## 2022-11-17 DIAGNOSIS — Z12.4 SCREENING FOR CERVICAL CANCER: ICD-10-CM

## 2022-11-17 DIAGNOSIS — Z12.31 ENCOUNTER FOR SCREENING MAMMOGRAM FOR MALIGNANT NEOPLASM OF BREAST: ICD-10-CM

## 2022-11-17 DIAGNOSIS — E66.01 CLASS 3 SEVERE OBESITY DUE TO EXCESS CALORIES WITHOUT SERIOUS COMORBIDITY WITH BODY MASS INDEX (BMI) OF 40.0 TO 44.9 IN ADULT: ICD-10-CM

## 2022-11-17 DIAGNOSIS — R40.0 DAYTIME SOMNOLENCE: ICD-10-CM

## 2022-11-17 DIAGNOSIS — Z23 NEED FOR INFLUENZA VACCINATION: ICD-10-CM

## 2022-11-17 DIAGNOSIS — Z12.11 COLON CANCER SCREENING: ICD-10-CM

## 2022-11-17 DIAGNOSIS — K21.9 GASTROESOPHAGEAL REFLUX DISEASE, UNSPECIFIED WHETHER ESOPHAGITIS PRESENT: ICD-10-CM

## 2022-11-17 DIAGNOSIS — M51.36 DDD (DEGENERATIVE DISC DISEASE), LUMBAR: ICD-10-CM

## 2022-11-17 DIAGNOSIS — M79.7 FIBROMYALGIA: ICD-10-CM

## 2022-11-17 DIAGNOSIS — Z00.01 ENCOUNTER FOR GENERAL ADULT MEDICAL EXAMINATION WITH ABNORMAL FINDINGS: Primary | ICD-10-CM

## 2022-11-17 DIAGNOSIS — I10 ESSENTIAL HYPERTENSION: ICD-10-CM

## 2022-11-17 DIAGNOSIS — Z13.220 SCREENING FOR HYPERLIPIDEMIA: ICD-10-CM

## 2022-11-17 PROCEDURE — 99396 PREV VISIT EST AGE 40-64: CPT | Performed by: FAMILY MEDICINE

## 2022-11-17 PROCEDURE — 99214 OFFICE O/P EST MOD 30 MIN: CPT | Performed by: FAMILY MEDICINE

## 2022-11-17 RX ORDER — OMEPRAZOLE 20 MG/1
20 CAPSULE, DELAYED RELEASE ORAL DAILY
Qty: 30 CAPSULE | Refills: 4 | Status: SHIPPED | OUTPATIENT
Start: 2022-11-17

## 2022-11-17 RX ORDER — MELOXICAM 15 MG/1
15 TABLET ORAL DAILY
Qty: 30 TABLET | Refills: 12 | Status: SHIPPED | OUTPATIENT
Start: 2022-11-17

## 2022-11-17 NOTE — ASSESSMENT & PLAN NOTE
Patient's (Body mass index is 41.9 kg/m².) indicates that they are obese (BMI >30) with health conditions that include hypertension . Weight is improving with lifestyle modifications. BMI is is above average; BMI management plan is completed. We discussed portion control and increasing exercise.

## 2022-11-19 LAB
ALBUMIN SERPL-MCNC: 4.2 G/DL (ref 3.8–4.8)
ALBUMIN/GLOB SERPL: 1.3 {RATIO} (ref 1.2–2.2)
ALP SERPL-CCNC: 132 IU/L (ref 44–121)
ALT SERPL-CCNC: 20 IU/L (ref 0–32)
AST SERPL-CCNC: 18 IU/L (ref 0–40)
BASOPHILS # BLD AUTO: 0.1 X10E3/UL (ref 0–0.2)
BASOPHILS NFR BLD AUTO: 1 %
BILIRUB SERPL-MCNC: 0.2 MG/DL (ref 0–1.2)
BUN SERPL-MCNC: 19 MG/DL (ref 6–24)
BUN/CREAT SERPL: 24 (ref 9–23)
CALCIUM SERPL-MCNC: 10 MG/DL (ref 8.7–10.2)
CHLORIDE SERPL-SCNC: 102 MMOL/L (ref 96–106)
CHOLEST SERPL-MCNC: 217 MG/DL (ref 100–199)
CHOLEST/HDLC SERPL: 4.5 RATIO (ref 0–4.4)
CO2 SERPL-SCNC: 26 MMOL/L (ref 20–29)
CREAT SERPL-MCNC: 0.79 MG/DL (ref 0.57–1)
EGFRCR SERPLBLD CKD-EPI 2021: 93 ML/MIN/1.73
EOSINOPHIL # BLD AUTO: 0.5 X10E3/UL (ref 0–0.4)
EOSINOPHIL NFR BLD AUTO: 4 %
ERYTHROCYTE [DISTWIDTH] IN BLOOD BY AUTOMATED COUNT: 16.3 % (ref 11.7–15.4)
GLOBULIN SER CALC-MCNC: 3.2 G/DL (ref 1.5–4.5)
GLUCOSE SERPL-MCNC: 102 MG/DL (ref 70–99)
HCT VFR BLD AUTO: 37.1 % (ref 34–46.6)
HDLC SERPL-MCNC: 48 MG/DL
HGB BLD-MCNC: 11.5 G/DL (ref 11.1–15.9)
IMM GRANULOCYTES # BLD AUTO: 0 X10E3/UL (ref 0–0.1)
IMM GRANULOCYTES NFR BLD AUTO: 0 %
LDLC SERPL CALC-MCNC: 138 MG/DL (ref 0–99)
LYMPHOCYTES # BLD AUTO: 2.6 X10E3/UL (ref 0.7–3.1)
LYMPHOCYTES NFR BLD AUTO: 25 %
MCH RBC QN AUTO: 24.1 PG (ref 26.6–33)
MCHC RBC AUTO-ENTMCNC: 31 G/DL (ref 31.5–35.7)
MCV RBC AUTO: 78 FL (ref 79–97)
MONOCYTES # BLD AUTO: 0.5 X10E3/UL (ref 0.1–0.9)
MONOCYTES NFR BLD AUTO: 5 %
NEUTROPHILS # BLD AUTO: 6.8 X10E3/UL (ref 1.4–7)
NEUTROPHILS NFR BLD AUTO: 65 %
PLATELET # BLD AUTO: 453 X10E3/UL (ref 150–450)
POTASSIUM SERPL-SCNC: 5.3 MMOL/L (ref 3.5–5.2)
PROT SERPL-MCNC: 7.4 G/DL (ref 6–8.5)
RBC # BLD AUTO: 4.77 X10E6/UL (ref 3.77–5.28)
SODIUM SERPL-SCNC: 140 MMOL/L (ref 134–144)
TRIGL SERPL-MCNC: 175 MG/DL (ref 0–149)
TSH SERPL DL<=0.005 MIU/L-ACNC: 1.05 UIU/ML (ref 0.45–4.5)
VLDLC SERPL CALC-MCNC: 31 MG/DL (ref 5–40)
WBC # BLD AUTO: 10.5 X10E3/UL (ref 3.4–10.8)

## 2022-12-08 ENCOUNTER — HOSPITAL ENCOUNTER (OUTPATIENT)
Dept: MAMMOGRAPHY | Facility: HOSPITAL | Age: 47
Discharge: HOME | End: 2022-12-08
Admitting: FAMILY MEDICINE

## 2022-12-08 DIAGNOSIS — Z12.31 ENCOUNTER FOR SCREENING MAMMOGRAM FOR MALIGNANT NEOPLASM OF BREAST: ICD-10-CM

## 2022-12-08 PROCEDURE — 77067 SCR MAMMO BI INCL CAD: CPT

## 2022-12-08 PROCEDURE — 77063 BREAST TOMOSYNTHESIS BI: CPT

## 2023-02-02 ENCOUNTER — TELEPHONE (OUTPATIENT)
Dept: FAMILY MEDICINE CLINIC | Facility: CLINIC | Age: 48
End: 2023-02-02
Payer: COMMERCIAL

## 2023-02-02 NOTE — TELEPHONE ENCOUNTER
Dr. Tolentino's office called and said that patient no showed her appointment yesterday we had sent over a Referral to them for patient to have annual cervical screening.

## 2023-02-03 NOTE — TELEPHONE ENCOUNTER
Called pt and she said she didn't realize that she had an appt she said that she would call them and get scheduled

## 2023-02-09 NOTE — PROGRESS NOTES
Subjective   Alexus Serrano is a 47 y.o. female. Presents to [facility] MEDICAL [facility]    Chief Complaint   Patient presents with   • Hypertension   • Hyperlipidemia       Hypertension  This is a chronic problem. The current episode started more than 1 year ago. Associated symptoms include chest pain and shortness of breath. Pertinent negatives include no headaches, malaise/fatigue or palpitations. Risk factors for coronary artery disease include dyslipidemia and obesity. Current antihypertension treatment includes nothing.   Hyperlipidemia  This is a chronic problem. The current episode started more than 1 year ago. Exacerbating diseases include obesity. She has no history of diabetes. Associated symptoms include chest pain and shortness of breath. Current antihyperlipidemic treatment includes herbal therapy. The current treatment provides moderate improvement of lipids. Risk factors for coronary artery disease include dyslipidemia, hypertension and obesity.        I personally reviewed and updated the patient's allergies, medications, problem list, and past medical, surgical, social, and family history. I have reviewed and confirmed the accuracy of the History of Present Illness and Review of Symptoms as documented by the MA/LPN/RN. Mela Casey MD    Allergies:  No Known Allergies    Social History:  Social History     Socioeconomic History   • Marital status: Single   Tobacco Use   • Smoking status: Every Day     Types: Cigarettes     Start date: 8/2/2021   • Tobacco comments:     vapes    Vaping Use   • Vaping Use: Every day   • Substances: Nicotine, Flavoring   • Devices: Pre-filled or refillable cartridge   Substance and Sexual Activity   • Alcohol use: Not Currently   • Drug use: Not Currently     Types: Methamphetamines     Comment: clean for 27 months    • Sexual activity: Not Currently       Family History:  Family History   Problem Relation Age of Onset   • Cancer Mother 61        lung   •  Colon cancer Maternal Grandmother 62   • Breast cancer Paternal Aunt 40         from it   • Breast cancer Cousin 51         from it   • Ovarian cancer Neg Hx        Past Medical History :  Patient Active Problem List   Diagnosis   • Fibromyalgia   • DDD (degenerative disc disease), lumbar   • Premenstrual dysphoric syndrome   • Essential hypertension   • Anxiety   • Mild episode of recurrent major depressive disorder (HCC)   • GERD (gastroesophageal reflux disease)   • Snoring   • Right ovarian cyst   • Class 3 severe obesity due to excess calories without serious comorbidity with body mass index (BMI) of 40.0 to 44.9 in adult (HCC)   • Stress incontinence   • Daytime somnolence   • Mixed hyperlipidemia   • Hyperglycemia   • Borderline diabetes       Medication List:    Current Outpatient Medications:   •  coenzyme Q10 100 MG capsule, Take 100 mg by mouth Daily., Disp: , Rfl:   •  FLUoxetine (PROzac) 40 MG capsule, Take 1 capsule by mouth Daily., Disp: 30 capsule, Rfl: 12  •  meloxicam (MOBIC) 15 MG tablet, Take 1 tablet by mouth Daily., Disp: 30 tablet, Rfl: 12  •  Omega-3 1000 MG capsule, Take  by mouth., Disp: , Rfl:   •  omeprazole (priLOSEC) 20 MG capsule, Take 1 capsule by mouth Daily., Disp: 30 capsule, Rfl: 4  •  atorvastatin (LIPITOR) 10 MG tablet, Take 1 tablet by mouth Daily., Disp: 30 tablet, Rfl: 12  •  metFORMIN (GLUCOPHAGE) 500 MG tablet, Take 1 tablet by mouth Daily With Breakfast., Disp: 30 tablet, Rfl: 12    Past Surgical History:  No past surgical history on file.      Physical Exam:      Vital Signs:    Vitals:    02/10/23 1609   BP: 122/76   Pulse: 93   Resp: 18   Temp: 97.8 °F (36.6 °C)   SpO2: 99%        Wt Readings from Last 3 Encounters:   02/10/23 118 kg (259 lb 9.6 oz)   22 118 kg (259 lb 9.6 oz)   22 118 kg (260 lb)       Result Review :   The following data was reviewed by: Mela Casey MD on 02/10/2023:  Most Recent A1C    HGBA1C Most Recent 2/10/23   Hemoglobin  A1C 5.7                    Physical Exam  Vitals reviewed.   Constitutional:       Appearance: Normal appearance. She is well-developed.   HENT:      Head: Normocephalic and atraumatic.   Eyes:      General:         Right eye: No discharge.         Left eye: No discharge.   Cardiovascular:      Rate and Rhythm: Normal rate and regular rhythm.      Heart sounds: Normal heart sounds. No murmur heard.    No friction rub. No gallop.   Pulmonary:      Effort: Pulmonary effort is normal. No respiratory distress.      Breath sounds: Normal breath sounds. No wheezing or rales.   Skin:     General: Skin is warm and dry.      Findings: No rash.   Neurological:      Mental Status: She is alert and oriented to person, place, and time.      Coordination: Coordination normal.      Gait: Gait normal.   Psychiatric:         Behavior: Behavior is cooperative.         Assessment and Plan:  Problems Addressed this Visit        Cardiac and Vasculature    Essential hypertension - Primary     Hypertension is unchanged.  Continue current treatment regimen.  Dietary sodium restriction.  Weight loss.  Blood pressure will be reassessed in 3 months.         Mixed hyperlipidemia    Relevant Medications    atorvastatin (LIPITOR) 10 MG tablet    Other Relevant Orders    Comprehensive Metabolic Panel (Completed)    Lipid Panel With / Chol / HDL Ratio (Completed)       Endocrine and Metabolic    Class 3 severe obesity due to excess calories without serious comorbidity with body mass index (BMI) of 40.0 to 44.9 in adult (HCC)     Patient's (Body mass index is 41.9 kg/m².) indicates that they are obese (BMI >30) with health conditions that include hypertension and dyslipidemias . Weight is unchanged. BMI  is above average; BMI management plan is completed. We discussed portion control and increasing exercise.          Hyperglycemia    Relevant Orders    POC Glycosylated Hemoglobin (Hb A1C) (Completed)    Borderline diabetes     Start  metformin    Diagnosis, treatment and and course discussed. Potential side effects discussed. Return if there is worsening or persistence of symptoms.            Relevant Medications    metFORMIN (GLUCOPHAGE) 500 MG tablet   Diagnoses       Codes Comments    Essential hypertension    -  Primary ICD-10-CM: I10  ICD-9-CM: 401.9     Mixed hyperlipidemia     ICD-10-CM: E78.2  ICD-9-CM: 272.2     Class 3 severe obesity due to excess calories without serious comorbidity with body mass index (BMI) of 40.0 to 44.9 in adult (HCC)     ICD-10-CM: E66.01, Z68.41  ICD-9-CM: 278.01, V85.41     Hyperglycemia     ICD-10-CM: R73.9  ICD-9-CM: 790.29     Borderline diabetes     ICD-10-CM: R73.03  ICD-9-CM: 790.29            Class 3 Severe Obesity (BMI >=40). Obesity-related health conditions include the following: dyslipidemias. Obesity is unchanged. BMI is is above average; BMI management plan is completed. We discussed low calorie, low carb based diet program, portion control and increasing exercise.      An After Visit Summary and PPPS were given to the patient.       I wore protective equipment throughout this patient encounter to include mask and eyewear. Hand hygiene was performed before donning protective equipment and after removal when leaving the room.

## 2023-02-10 ENCOUNTER — OFFICE VISIT (OUTPATIENT)
Dept: FAMILY MEDICINE CLINIC | Facility: CLINIC | Age: 48
End: 2023-02-10
Payer: COMMERCIAL

## 2023-02-10 VITALS
DIASTOLIC BLOOD PRESSURE: 76 MMHG | SYSTOLIC BLOOD PRESSURE: 122 MMHG | HEIGHT: 66 IN | OXYGEN SATURATION: 99 % | WEIGHT: 259.6 LBS | BODY MASS INDEX: 41.72 KG/M2 | RESPIRATION RATE: 18 BRPM | TEMPERATURE: 97.8 F | HEART RATE: 93 BPM

## 2023-02-10 DIAGNOSIS — I10 ESSENTIAL HYPERTENSION: Primary | ICD-10-CM

## 2023-02-10 DIAGNOSIS — E66.01 CLASS 3 SEVERE OBESITY DUE TO EXCESS CALORIES WITHOUT SERIOUS COMORBIDITY WITH BODY MASS INDEX (BMI) OF 40.0 TO 44.9 IN ADULT: ICD-10-CM

## 2023-02-10 DIAGNOSIS — R73.9 HYPERGLYCEMIA: ICD-10-CM

## 2023-02-10 DIAGNOSIS — E78.2 MIXED HYPERLIPIDEMIA: ICD-10-CM

## 2023-02-10 DIAGNOSIS — R73.03 BORDERLINE DIABETES: ICD-10-CM

## 2023-02-10 PROBLEM — E78.49 OTHER HYPERLIPIDEMIA: Status: ACTIVE | Noted: 2023-02-10

## 2023-02-10 LAB
EXPIRATION DATE: NORMAL
HBA1C MFR BLD: 5.7 %
Lab: NORMAL

## 2023-02-10 PROCEDURE — 99214 OFFICE O/P EST MOD 30 MIN: CPT | Performed by: FAMILY MEDICINE

## 2023-02-10 PROCEDURE — 83036 HEMOGLOBIN GLYCOSYLATED A1C: CPT | Performed by: FAMILY MEDICINE

## 2023-02-10 RX ORDER — CHLORAL HYDRATE 500 MG
CAPSULE ORAL
COMMUNITY

## 2023-02-10 RX ORDER — ATORVASTATIN CALCIUM 10 MG/1
10 TABLET, FILM COATED ORAL DAILY
Qty: 30 TABLET | Refills: 12 | Status: SHIPPED | OUTPATIENT
Start: 2023-02-10

## 2023-02-10 RX ORDER — UBIDECARENONE 100 MG
100 CAPSULE ORAL DAILY
COMMUNITY

## 2023-02-10 NOTE — ASSESSMENT & PLAN NOTE
Patient's (Body mass index is 41.9 kg/m².) indicates that they are obese (BMI >30) with health conditions that include hypertension and dyslipidemias . Weight is unchanged. BMI  is above average; BMI management plan is completed. We discussed portion control and increasing exercise.

## 2023-02-18 LAB
ALBUMIN SERPL-MCNC: 4.2 G/DL (ref 3.8–4.8)
ALBUMIN/GLOB SERPL: 1.4 {RATIO} (ref 1.2–2.2)
ALP SERPL-CCNC: 116 IU/L (ref 44–121)
ALT SERPL-CCNC: 21 IU/L (ref 0–32)
AST SERPL-CCNC: 20 IU/L (ref 0–40)
BILIRUB SERPL-MCNC: 0.2 MG/DL (ref 0–1.2)
BUN SERPL-MCNC: 20 MG/DL (ref 6–24)
BUN/CREAT SERPL: 30 (ref 9–23)
CALCIUM SERPL-MCNC: 9.9 MG/DL (ref 8.7–10.2)
CHLORIDE SERPL-SCNC: 100 MMOL/L (ref 96–106)
CHOLEST SERPL-MCNC: 166 MG/DL (ref 100–199)
CHOLEST/HDLC SERPL: 3.5 RATIO (ref 0–4.4)
CO2 SERPL-SCNC: 25 MMOL/L (ref 20–29)
CREAT SERPL-MCNC: 0.66 MG/DL (ref 0.57–1)
EGFRCR SERPLBLD CKD-EPI 2021: 109 ML/MIN/1.73
GLOBULIN SER CALC-MCNC: 3 G/DL (ref 1.5–4.5)
GLUCOSE SERPL-MCNC: 99 MG/DL (ref 70–99)
HDLC SERPL-MCNC: 48 MG/DL
LDLC SERPL CALC-MCNC: 94 MG/DL (ref 0–99)
POTASSIUM SERPL-SCNC: 5.4 MMOL/L (ref 3.5–5.2)
PROT SERPL-MCNC: 7.2 G/DL (ref 6–8.5)
SODIUM SERPL-SCNC: 138 MMOL/L (ref 134–144)
TRIGL SERPL-MCNC: 133 MG/DL (ref 0–149)
VLDLC SERPL CALC-MCNC: 24 MG/DL (ref 5–40)

## 2023-02-20 NOTE — ASSESSMENT & PLAN NOTE
Start metformin    Diagnosis, treatment and and course discussed. Potential side effects discussed. Return if there is worsening or persistence of symptoms.

## 2023-03-08 NOTE — PROGRESS NOTES
Subjective   Alexus Serrano is a 47 y.o. female. Presents to [facility]    Chief Complaint   Patient presents with   • Blood Sugar Problem       Blood Sugar Problem  This is a new problem. The current episode started 1 to 4 weeks ago. The problem has been unchanged. Pertinent negatives include no headaches, joint swelling or weakness. Treatments tried: metformin 500 MG. The treatment provided mild relief.    She is borderline.   She is doing weill with her metformin. She is exercising and watching her diet.     I personally reviewed and updated the patient's allergies, medications, problem list, and past medical, surgical, social, and family history. I have reviewed and confirmed the accuracy of the History of Present Illness and Review of Symptoms as documented by the MA/LPN/RN. Mela Casey MD    Allergies:  No Known Allergies    Social History:  Social History     Socioeconomic History   • Marital status: Single   Tobacco Use   • Smoking status: Every Day     Types: Cigarettes     Start date: 2021   • Tobacco comments:     vapes    Vaping Use   • Vaping Use: Every day   • Substances: Nicotine, Flavoring   • Devices: Pre-filled or refillable cartridge   Substance and Sexual Activity   • Alcohol use: Not Currently   • Drug use: Not Currently     Types: Methamphetamines     Comment: clean for 27 months    • Sexual activity: Not Currently       Family History:  Family History   Problem Relation Age of Onset   • Cancer Mother 61        lung   • Colon cancer Maternal Grandmother 62   • Breast cancer Paternal Aunt 40         from it   • Breast cancer Cousin 51         from it   • Ovarian cancer Neg Hx        Past Medical History :  Patient Active Problem List   Diagnosis   • Fibromyalgia   • DDD (degenerative disc disease), lumbar   • Premenstrual dysphoric syndrome   • Essential hypertension   • Anxiety   • Mild episode of recurrent major depressive disorder (HCC)   • GERD  (gastroesophageal reflux disease)   • Snoring   • Right ovarian cyst   • Class 3 severe obesity due to excess calories without serious comorbidity with body mass index (BMI) of 40.0 to 44.9 in adult (HCC)   • Stress incontinence   • Daytime somnolence   • Mixed hyperlipidemia   • Hyperglycemia   • Borderline diabetes       Medication List:    Current Outpatient Medications:   •  atorvastatin (LIPITOR) 10 MG tablet, Take 1 tablet by mouth Daily., Disp: 30 tablet, Rfl: 12  •  coenzyme Q10 100 MG capsule, Take 1 capsule by mouth Daily., Disp: , Rfl:   •  FLUoxetine (PROzac) 40 MG capsule, Take 1 capsule by mouth Daily., Disp: 30 capsule, Rfl: 12  •  meloxicam (MOBIC) 15 MG tablet, Take 1 tablet by mouth Daily., Disp: 30 tablet, Rfl: 12  •  metFORMIN (GLUCOPHAGE) 500 MG tablet, Take 1 tablet by mouth Daily With Breakfast., Disp: 30 tablet, Rfl: 12  •  Omega-3 1000 MG capsule, Take  by mouth., Disp: , Rfl:   •  omeprazole (priLOSEC) 20 MG capsule, Take 1 capsule by mouth Daily., Disp: 30 capsule, Rfl: 4    Past Surgical History:  No past surgical history on file.      Physical Exam:      Vital Signs:    Vitals:    03/10/23 1614   BP: 128/86   Pulse: 97   Resp: 18   Temp: 97.5 °F (36.4 °C)   SpO2: 97%        Wt Readings from Last 3 Encounters:   03/10/23 115 kg (252 lb 9.6 oz)   02/10/23 118 kg (259 lb 9.6 oz)   11/17/22 118 kg (259 lb 9.6 oz)       Result Review :                Physical Exam  Vitals reviewed.   Constitutional:       Appearance: Normal appearance. She is well-developed.   HENT:      Head: Normocephalic and atraumatic.   Eyes:      General:         Right eye: No discharge.         Left eye: No discharge.   Cardiovascular:      Rate and Rhythm: Normal rate and regular rhythm.      Heart sounds: Normal heart sounds. No murmur heard.    No friction rub. No gallop.   Pulmonary:      Effort: Pulmonary effort is normal. No respiratory distress.      Breath sounds: Normal breath sounds. No wheezing or rales.    Skin:     General: Skin is warm and dry.      Findings: No rash.   Neurological:      Mental Status: She is alert and oriented to person, place, and time.      Coordination: Coordination normal.      Gait: Gait normal.   Psychiatric:         Behavior: Behavior is cooperative.         Assessment and Plan:  Problems Addressed this Visit        Endocrine and Metabolic    Class 3 severe obesity due to excess calories without serious comorbidity with body mass index (BMI) of 40.0 to 44.9 in adult ([facility])     Patient's (Body mass index is 40.77 kg/m².) indicates that they are obese (BMI >30) with health conditions that include hypertension and dyslipidemias . Weight is improving with lifestyle modifications. BMI  is above average; BMI management plan is completed. We discussed portion control and increasing exercise.          Borderline diabetes - Primary   Diagnoses       Codes Comments    Borderline diabetes    -  Primary ICD-10-CM: R73.03  ICD-9-CM: 790.29     Class 3 severe obesity due to excess calories without serious comorbidity with body mass index (BMI) of 40.0 to 44.9 in adult ([facility])     ICD-10-CM: E66.01, Z68.41  ICD-9-CM: 278.01, V85.41            Class 3 Severe Obesity (BMI >=40). Obesity-related health conditions include the following: dyslipidemias. Obesity is improving with treatment. BMI is is above average; BMI management plan is completed. We discussed low calorie, low carb based diet program, portion control and increasing exercise.      An After Visit Summary and PPPS were given to the patient.       I wore protective equipment throughout this patient encounter to include mask and eyewear. Hand hygiene was performed before donning protective equipment and after removal when leaving the room.

## 2023-03-10 ENCOUNTER — OFFICE VISIT (OUTPATIENT)
Dept: FAMILY MEDICINE CLINIC | Facility: CLINIC | Age: 48
End: 2023-03-10
Payer: COMMERCIAL

## 2023-03-10 VITALS
RESPIRATION RATE: 18 BRPM | TEMPERATURE: 97.5 F | HEIGHT: 66 IN | HEART RATE: 97 BPM | BODY MASS INDEX: 40.6 KG/M2 | WEIGHT: 252.6 LBS | OXYGEN SATURATION: 97 % | SYSTOLIC BLOOD PRESSURE: 128 MMHG | DIASTOLIC BLOOD PRESSURE: 86 MMHG

## 2023-03-10 DIAGNOSIS — E66.01 CLASS 3 SEVERE OBESITY DUE TO EXCESS CALORIES WITHOUT SERIOUS COMORBIDITY WITH BODY MASS INDEX (BMI) OF 40.0 TO 44.9 IN ADULT: ICD-10-CM

## 2023-03-10 DIAGNOSIS — R73.03 BORDERLINE DIABETES: Primary | ICD-10-CM

## 2023-03-10 PROCEDURE — 99212 OFFICE O/P EST SF 10 MIN: CPT | Performed by: FAMILY MEDICINE

## 2023-03-10 NOTE — ASSESSMENT & PLAN NOTE
Patient's (Body mass index is 40.77 kg/m².) indicates that they are obese (BMI >30) with health conditions that include hypertension and dyslipidemias . Weight is improving with lifestyle modifications. BMI  is above average; BMI management plan is completed. We discussed portion control and increasing exercise.

## 2023-04-11 ENCOUNTER — OFFICE VISIT (OUTPATIENT)
Dept: FAMILY MEDICINE CLINIC | Facility: CLINIC | Age: 48
End: 2023-04-11
Payer: COMMERCIAL

## 2023-04-11 VITALS
BODY MASS INDEX: 39.6 KG/M2 | OXYGEN SATURATION: 98 % | WEIGHT: 246.4 LBS | RESPIRATION RATE: 19 BRPM | DIASTOLIC BLOOD PRESSURE: 74 MMHG | HEART RATE: 76 BPM | SYSTOLIC BLOOD PRESSURE: 124 MMHG | HEIGHT: 66 IN | TEMPERATURE: 97.5 F

## 2023-04-11 DIAGNOSIS — R73.9 HYPERGLYCEMIA: Primary | ICD-10-CM

## 2023-04-11 DIAGNOSIS — E66.09 CLASS 2 OBESITY DUE TO EXCESS CALORIES WITHOUT SERIOUS COMORBIDITY WITH BODY MASS INDEX (BMI) OF 39.0 TO 39.9 IN ADULT: ICD-10-CM

## 2023-04-11 DIAGNOSIS — F41.9 ANXIETY: ICD-10-CM

## 2023-04-11 DIAGNOSIS — R73.03 BORDERLINE DIABETES: ICD-10-CM

## 2023-04-11 DIAGNOSIS — F33.0 MILD EPISODE OF RECURRENT MAJOR DEPRESSIVE DISORDER: ICD-10-CM

## 2023-04-11 PROBLEM — E66.812 CLASS 2 OBESITY DUE TO EXCESS CALORIES WITHOUT SERIOUS COMORBIDITY WITH BODY MASS INDEX (BMI) OF 39.0 TO 39.9 IN ADULT: Status: ACTIVE | Noted: 2021-11-01

## 2023-04-11 PROCEDURE — 99213 OFFICE O/P EST LOW 20 MIN: CPT | Performed by: FAMILY MEDICINE

## 2023-04-11 RX ORDER — FLUOXETINE HYDROCHLORIDE 40 MG/1
40 CAPSULE ORAL DAILY
Qty: 30 CAPSULE | Refills: 12 | Status: SHIPPED | OUTPATIENT
Start: 2023-04-11

## 2023-04-11 NOTE — ASSESSMENT & PLAN NOTE
Patient's (Body mass index is 39.77 kg/m².) indicates that they are obese (BMI >30) with health conditions that include hypertension and dyslipidemias . Weight is improving with treatment. BMI  is above average; BMI management plan is completed. We discussed portion control and increasing exercise.

## 2023-04-11 NOTE — ASSESSMENT & PLAN NOTE
She is working on weight loss  She is on metformin  She has cut out simple carbs  She is walking often

## 2023-04-11 NOTE — PROGRESS NOTES
Subjective   Alexus Serrano is a 47 y.o. female. Presents to [facility]    Chief Complaint   Patient presents with   • Blood Sugar Problem       Blood Sugar Problem  This is a chronic problem. The current episode started more than 1 month ago. The problem has been unchanged. Pertinent negatives include no headaches, joint swelling or weakness. Treatments tried: metformin 500 MG. The treatment provided moderate relief.        I personally reviewed and updated the patient's allergies, medications, problem list, and past medical, surgical, social, and family history. I have reviewed and confirmed the accuracy of the History of Present Illness and Review of Symptoms as documented by the MA/LPN/RN. Mela Casey MD    Allergies:  No Known Allergies    Social History:  Social History     Socioeconomic History   • Marital status: Single   Tobacco Use   • Smoking status: Every Day     Types: Cigarettes     Start date: 2021   • Tobacco comments:     vapes    Vaping Use   • Vaping Use: Every day   • Substances: Nicotine, Flavoring   • Devices: Pre-filled or refillable cartridge   Substance and Sexual Activity   • Alcohol use: Not Currently   • Drug use: Not Currently     Types: Methamphetamines     Comment: clean for 27 months    • Sexual activity: Not Currently       Family History:  Family History   Problem Relation Age of Onset   • Cancer Mother 61        lung   • Colon cancer Maternal Grandmother 62   • Breast cancer Paternal Aunt 40         from it   • Breast cancer Cousin 51         from it   • Ovarian cancer Neg Hx        Past Medical History :  Patient Active Problem List   Diagnosis   • Fibromyalgia   • DDD (degenerative disc disease), lumbar   • Premenstrual dysphoric syndrome   • Essential hypertension   • Anxiety   • Mild episode of recurrent major depressive disorder   • GERD (gastroesophageal reflux disease)   • Snoring   • Right ovarian cyst   • Class 2 obesity due to excess  calories without serious comorbidity with body mass index (BMI) of 39.0 to 39.9 in adult   • Stress incontinence   • Daytime somnolence   • Mixed hyperlipidemia   • Hyperglycemia   • Borderline diabetes       Medication List:    Current Outpatient Medications:   •  atorvastatin (LIPITOR) 10 MG tablet, Take 1 tablet by mouth Daily., Disp: 30 tablet, Rfl: 12  •  coenzyme Q10 100 MG capsule, Take 1 capsule by mouth Daily., Disp: , Rfl:   •  FLUoxetine (PROzac) 40 MG capsule, Take 1 capsule by mouth Daily., Disp: 30 capsule, Rfl: 12  •  meloxicam (MOBIC) 15 MG tablet, Take 1 tablet by mouth Daily., Disp: 30 tablet, Rfl: 12  •  metFORMIN (GLUCOPHAGE) 500 MG tablet, Take 1 tablet by mouth Daily With Breakfast., Disp: 30 tablet, Rfl: 12  •  Omega-3 1000 MG capsule, Take  by mouth., Disp: , Rfl:   •  omeprazole (priLOSEC) 20 MG capsule, Take 1 capsule by mouth Daily., Disp: 30 capsule, Rfl: 4    Past Surgical History:  No past surgical history on file.      Physical Exam:      Vital Signs:    Vitals:    04/11/23 1638   BP: 124/74   Pulse: 76   Resp: 19   Temp: 97.5 °F (36.4 °C)   SpO2: 98%        Wt Readings from Last 3 Encounters:   04/11/23 112 kg (246 lb 6.4 oz)   03/10/23 115 kg (252 lb 9.6 oz)   02/10/23 118 kg (259 lb 9.6 oz)       Result Review :                Physical Exam  Vitals reviewed.   Constitutional:       Appearance: Normal appearance. She is well-developed.   HENT:      Head: Normocephalic and atraumatic.   Eyes:      General:         Right eye: No discharge.         Left eye: No discharge.   Cardiovascular:      Rate and Rhythm: Normal rate and regular rhythm.      Heart sounds: Normal heart sounds. No murmur heard.    No friction rub. No gallop.   Pulmonary:      Effort: Pulmonary effort is normal. No respiratory distress.      Breath sounds: Normal breath sounds. No wheezing or rales.   Skin:     General: Skin is warm and dry.      Findings: No rash.   Neurological:      Mental Status: She is alert and  oriented to person, place, and time.      Coordination: Coordination normal.      Gait: Gait normal.   Psychiatric:         Behavior: Behavior is cooperative.         Assessment and Plan:  Problems Addressed this Visit        Endocrine and Metabolic    Class 2 obesity due to excess calories without serious comorbidity with body mass index (BMI) of 39.0 to 39.9 in adult     Patient's (Body mass index is 39.77 kg/m².) indicates that they are obese (BMI >30) with health conditions that include hypertension and dyslipidemias . Weight is improving with treatment. BMI  is above average; BMI management plan is completed. We discussed portion control and increasing exercise.          Hyperglycemia - Primary    Borderline diabetes     She is working on weight loss  She is on metformin  She has cut out simple carbs  She is walking often            Mental Health    Anxiety    Relevant Medications    FLUoxetine (PROzac) 40 MG capsule    Mild episode of recurrent major depressive disorder     Patient's depression is recurrent and is moderate without psychosis. Their depression is currently active and the condition is improving with treatment. This will be reassessed at the next regular appointment. F/U as described:patient will continue current medication therapy.         Relevant Medications    FLUoxetine (PROzac) 40 MG capsule   Diagnoses       Codes Comments    Hyperglycemia    -  Primary ICD-10-CM: R73.9  ICD-9-CM: 790.29     Anxiety     ICD-10-CM: F41.9  ICD-9-CM: 300.00     Mild episode of recurrent major depressive disorder     ICD-10-CM: F33.0  ICD-9-CM: 296.31     Class 2 obesity due to excess calories without serious comorbidity with body mass index (BMI) of 39.0 to 39.9 in adult     ICD-10-CM: E66.09, Z68.39  ICD-9-CM: 278.00, V85.39     Borderline diabetes     ICD-10-CM: R73.03  ICD-9-CM: 790.29            Class 3 Severe Obesity (BMI >=40). Obesity-related health conditions include the following: dyslipidemias.  Obesity is improving with lifestyle modifications. BMI is is above average; BMI management plan is completed. We discussed low calorie, low carb based diet program, portion control and increasing exercise.      An After Visit Summary and PPPS were given to the patient.

## 2023-05-12 NOTE — PROGRESS NOTES
Subjective   Alexus Serrano is a 47 y.o. female. Presents to [facility]    Chief Complaint   Patient presents with   • Blood Sugar Problem       Blood Sugar Problem  This is a chronic problem. The current episode started more than 1 month ago. The problem has been unchanged. Pertinent negatives include no abdominal pain, coughing, fatigue, headaches, joint swelling, nausea, numbness or weakness. Nothing aggravates the symptoms. Treatments tried: metformin 500 MG. The treatment provided moderate relief.        I personally reviewed and updated the patient's allergies, medications, problem list, and past medical, surgical, social, and family history. I have reviewed and confirmed the accuracy of the History of Present Illness and Review of Symptoms as documented by the MA/LPN/RN. Mela Casey MD    Allergies:  No Known Allergies    Social History:  Social History     Socioeconomic History   • Marital status: Single   Tobacco Use   • Smoking status: Every Day     Types: Cigarettes     Start date: 2021   • Tobacco comments:     vapes    Vaping Use   • Vaping Use: Every day   • Substances: Nicotine, Flavoring   • Devices: Pre-filled or refillable cartridge   Substance and Sexual Activity   • Alcohol use: Not Currently   • Drug use: Not Currently     Types: Methamphetamines     Comment: clean for 27 months    • Sexual activity: Not Currently       Family History:  Family History   Problem Relation Age of Onset   • Cancer Mother 61        lung   • Colon cancer Maternal Grandmother 62   • Breast cancer Paternal Aunt 40         from it   • Breast cancer Cousin 51         from it   • Ovarian cancer Neg Hx        Past Medical History :  Patient Active Problem List   Diagnosis   • Fibromyalgia   • DDD (degenerative disc disease), lumbar   • Premenstrual dysphoric syndrome   • Essential hypertension   • Anxiety   • Mild episode of recurrent major depressive disorder   • GERD (gastroesophageal  reflux disease)   • Snoring   • Right ovarian cyst   • Class 2 obesity due to excess calories without serious comorbidity with body mass index (BMI) of 38.0 to 38.9 in adult   • Stress incontinence   • Daytime somnolence   • Mixed hyperlipidemia   • Hyperglycemia   • Borderline diabetes       Medication List:    Current Outpatient Medications:   •  atorvastatin (LIPITOR) 10 MG tablet, Take 1 tablet by mouth Daily., Disp: 30 tablet, Rfl: 12  •  coenzyme Q10 100 MG capsule, Take 1 capsule by mouth Daily., Disp: , Rfl:   •  FLUoxetine (PROzac) 40 MG capsule, Take 1 capsule by mouth Daily., Disp: 30 capsule, Rfl: 12  •  meloxicam (MOBIC) 15 MG tablet, Take 1 tablet by mouth Daily., Disp: 30 tablet, Rfl: 12  •  metFORMIN (GLUCOPHAGE) 500 MG tablet, Take 1 tablet by mouth Daily With Breakfast., Disp: 30 tablet, Rfl: 12  •  Omega-3 1000 MG capsule, Take  by mouth., Disp: , Rfl:   •  omeprazole (priLOSEC) 20 MG capsule, Take 1 capsule by mouth Daily., Disp: 30 capsule, Rfl: 4    Past Surgical History:  No past surgical history on file.      Physical Exam:      Vital Signs:    Vitals:    05/15/23 1612   BP: 122/84   Pulse: 81   Resp: 18   Temp: 97.5 °F (36.4 °C)   SpO2: 96%        Wt Readings from Last 3 Encounters:   05/15/23 109 kg (241 lb)   04/11/23 112 kg (246 lb 6.4 oz)   03/10/23 115 kg (252 lb 9.6 oz)       Result Review :                Physical Exam  Vitals reviewed.   Constitutional:       Appearance: Normal appearance. She is well-developed.   HENT:      Head: Normocephalic and atraumatic.   Eyes:      General:         Right eye: No discharge.         Left eye: No discharge.   Cardiovascular:      Rate and Rhythm: Normal rate and regular rhythm.      Heart sounds: Normal heart sounds. No murmur heard.    No friction rub. No gallop.   Pulmonary:      Effort: Pulmonary effort is normal. No respiratory distress.      Breath sounds: Normal breath sounds. No wheezing or rales.   Skin:     General: Skin is warm and  dry.      Findings: No rash.   Neurological:      Mental Status: She is alert and oriented to person, place, and time.      Coordination: Coordination normal.      Gait: Gait normal.   Psychiatric:         Behavior: Behavior is cooperative.         Assessment and Plan:  Problems Addressed this Visit        Cardiac and Vasculature    Essential hypertension     Hypertension is unchanged.  Continue current treatment regimen.  Blood pressure will be reassessed in 3 months.         Mixed hyperlipidemia     Will get labs         Relevant Orders    Comprehensive Metabolic Panel (Completed)    Lipid Panel With / Chol / HDL Ratio (Completed)       Endocrine and Metabolic    Class 2 obesity due to excess calories without serious comorbidity with body mass index (BMI) of 38.0 to 38.9 in adult     Patient's (Body mass index is 38.9 kg/m².) indicates that they are obese (BMI >30) with health conditions that include hypertension and dyslipidemias . Weight is improving with lifestyle modifications. BMI  is above average; BMI management plan is completed. We discussed portion control and increasing exercise.          Borderline diabetes - Primary     Her fsbs are improving. She has lost weight  Most likely A1C is better  Will add to labs today         Relevant Orders    Hemoglobin A1c (Completed)   Diagnoses       Codes Comments    Borderline diabetes    -  Primary ICD-10-CM: R73.03  ICD-9-CM: 790.29     Class 2 obesity due to excess calories without serious comorbidity with body mass index (BMI) of 38.0 to 38.9 in adult     ICD-10-CM: E66.09, Z68.38  ICD-9-CM: 278.00, V85.38     Essential hypertension     ICD-10-CM: I10  ICD-9-CM: 401.9     Mixed hyperlipidemia     ICD-10-CM: E78.2  ICD-9-CM: 272.2            Class 2 Severe Obesity (BMI >=35 and <=39.9). Obesity-related health conditions include the following: diabetes mellitus. Obesity is improving with lifestyle modifications. BMI is is above average; BMI management plan is  completed. We discussed low calorie, low carb based diet program, portion control and increasing exercise.      An After Visit Summary and PPPS were given to the patient.

## 2023-05-15 ENCOUNTER — OFFICE VISIT (OUTPATIENT)
Dept: FAMILY MEDICINE CLINIC | Facility: CLINIC | Age: 48
End: 2023-05-15
Payer: COMMERCIAL

## 2023-05-15 VITALS
OXYGEN SATURATION: 96 % | RESPIRATION RATE: 18 BRPM | HEART RATE: 81 BPM | WEIGHT: 241 LBS | BODY MASS INDEX: 38.73 KG/M2 | HEIGHT: 66 IN | SYSTOLIC BLOOD PRESSURE: 122 MMHG | TEMPERATURE: 97.5 F | DIASTOLIC BLOOD PRESSURE: 84 MMHG

## 2023-05-15 DIAGNOSIS — E66.09 CLASS 2 OBESITY DUE TO EXCESS CALORIES WITHOUT SERIOUS COMORBIDITY WITH BODY MASS INDEX (BMI) OF 38.0 TO 38.9 IN ADULT: ICD-10-CM

## 2023-05-15 DIAGNOSIS — E78.2 MIXED HYPERLIPIDEMIA: ICD-10-CM

## 2023-05-15 DIAGNOSIS — R73.03 BORDERLINE DIABETES: Primary | ICD-10-CM

## 2023-05-15 DIAGNOSIS — I10 ESSENTIAL HYPERTENSION: ICD-10-CM

## 2023-05-15 NOTE — ASSESSMENT & PLAN NOTE
Patient's (Body mass index is 38.9 kg/m².) indicates that they are obese (BMI >30) with health conditions that include hypertension and dyslipidemias . Weight is improving with lifestyle modifications. BMI  is above average; BMI management plan is completed. We discussed portion control and increasing exercise.

## 2023-05-19 DIAGNOSIS — Z12.11 COLON CANCER SCREENING: ICD-10-CM

## 2023-05-24 LAB
ALBUMIN SERPL-MCNC: 4.5 G/DL (ref 3.8–4.8)
ALBUMIN/GLOB SERPL: 1.6 {RATIO} (ref 1.2–2.2)
ALP SERPL-CCNC: 96 IU/L (ref 44–121)
ALT SERPL-CCNC: 15 IU/L (ref 0–32)
AST SERPL-CCNC: 12 IU/L (ref 0–40)
BILIRUB SERPL-MCNC: <0.2 MG/DL (ref 0–1.2)
BUN SERPL-MCNC: 22 MG/DL (ref 6–24)
BUN/CREAT SERPL: 31 (ref 9–23)
CALCIUM SERPL-MCNC: 9.9 MG/DL (ref 8.7–10.2)
CHLORIDE SERPL-SCNC: 103 MMOL/L (ref 96–106)
CHOLEST SERPL-MCNC: 141 MG/DL (ref 100–199)
CHOLEST/HDLC SERPL: 3.1 RATIO (ref 0–4.4)
CO2 SERPL-SCNC: 21 MMOL/L (ref 20–29)
CREAT SERPL-MCNC: 0.72 MG/DL (ref 0.57–1)
EGFRCR SERPLBLD CKD-EPI 2021: 104 ML/MIN/1.73
GLOBULIN SER CALC-MCNC: 2.9 G/DL (ref 1.5–4.5)
GLUCOSE SERPL-MCNC: 105 MG/DL (ref 70–99)
HBA1C MFR BLD: 6 % (ref 4.8–5.6)
HDLC SERPL-MCNC: 45 MG/DL
LDLC SERPL CALC-MCNC: 77 MG/DL (ref 0–99)
POTASSIUM SERPL-SCNC: 4.8 MMOL/L (ref 3.5–5.2)
PROT SERPL-MCNC: 7.4 G/DL (ref 6–8.5)
SODIUM SERPL-SCNC: 138 MMOL/L (ref 134–144)
TRIGL SERPL-MCNC: 100 MG/DL (ref 0–149)
VLDLC SERPL CALC-MCNC: 19 MG/DL (ref 5–40)

## 2023-06-05 ENCOUNTER — OFFICE VISIT (OUTPATIENT)
Dept: FAMILY MEDICINE CLINIC | Facility: CLINIC | Age: 48
End: 2023-06-05
Payer: COMMERCIAL

## 2023-06-05 VITALS
BODY MASS INDEX: 38.7 KG/M2 | HEIGHT: 66 IN | OXYGEN SATURATION: 96 % | WEIGHT: 240.8 LBS | HEART RATE: 78 BPM | TEMPERATURE: 97.5 F | DIASTOLIC BLOOD PRESSURE: 84 MMHG | RESPIRATION RATE: 18 BRPM | SYSTOLIC BLOOD PRESSURE: 128 MMHG

## 2023-06-05 DIAGNOSIS — J40 BRONCHITIS: ICD-10-CM

## 2023-06-05 DIAGNOSIS — R35.0 URINARY FREQUENCY: Primary | ICD-10-CM

## 2023-06-05 LAB
BILIRUB BLD-MCNC: ABNORMAL MG/DL
CLARITY, POC: CLEAR
COLOR UR: ABNORMAL
EXPIRATION DATE: ABNORMAL
GLUCOSE UR STRIP-MCNC: NEGATIVE MG/DL
KETONES UR QL: NEGATIVE
LEUKOCYTE EST, POC: NEGATIVE
Lab: ABNORMAL
NITRITE UR-MCNC: NEGATIVE MG/ML
PH UR: 5 [PH] (ref 5–8)
PROT UR STRIP-MCNC: NEGATIVE MG/DL
RBC # UR STRIP: NEGATIVE /UL
SP GR UR: 1.01 (ref 1–1.03)
UROBILINOGEN UR QL: NORMAL

## 2023-06-05 PROCEDURE — 99214 OFFICE O/P EST MOD 30 MIN: CPT | Performed by: FAMILY MEDICINE

## 2023-06-05 PROCEDURE — 81003 URINALYSIS AUTO W/O SCOPE: CPT | Performed by: FAMILY MEDICINE

## 2023-06-05 RX ORDER — AZITHROMYCIN 250 MG/1
TABLET, FILM COATED ORAL
Qty: 6 TABLET | Refills: 0 | Status: SHIPPED | OUTPATIENT
Start: 2023-06-05

## 2023-06-05 RX ORDER — PREDNISONE 20 MG/1
20 TABLET ORAL DAILY
Qty: 20 TABLET | Refills: 0 | Status: SHIPPED | OUTPATIENT
Start: 2023-06-05 | End: 2023-06-18

## 2023-06-05 RX ORDER — ALBUTEROL SULFATE 90 UG/1
2 AEROSOL, METERED RESPIRATORY (INHALATION) EVERY 4 HOURS PRN
Qty: 8 G | Refills: 0 | Status: SHIPPED | OUTPATIENT
Start: 2023-06-05

## 2023-06-05 NOTE — PATIENT INSTRUCTIONS
Preventing Unhealthy Weight Gain, Adult  Staying at a healthy weight is important to your overall health. When fat builds up in your body, you may become overweight or obese. Being overweight or obese increases your risk of developing various health problems.  Unhealthy weight gain is often the result of making unhealthy food choices or not getting enough exercise. You can make changes to your lifestyle to prevent obesity and stay as healthy as possible.  How can unhealthy weight gain affect me?  Being overweight or obese can cause you to develop joint or bone problems, which can make it hard for you to stay active or do activities you enjoy. Being overweight also puts stress on your heart and lungs and can lead to health problems such as:  Diabetes.  Heart disease.  Some types of cancer.  Stroke.  Eating healthy, staying active, and having healthy habits can help to prevent unhealthy weight gain and lower your risk for health problems. These lifestyle changes will also help you manage stress and emotions, improve your self-esteem, and connect with friends and family.  What can increase my risk?  In addition to certain eating and lifestyle choices, some other factors that may make you more likely to have unhealthy weight gain include:  Having a family history of obesity.  Living in an area with limited access to:  [place], recreation centers, or sidewalks.  Healthy food choices, such as grocery stores and farmers' markets.  What actions can I take to prevent unhealthy weight gain?  Nutrition    Eat only as much as your body needs. To do this:  Pay attention to signs that you are hungry or full. Stop eating as soon as you feel full.  If you feel hungry, try drinking water first before eating. Drink enough water so your urine is pale yellow.  Eat smaller portions. Pay attention to portion sizes when eating out.  Look at serving sizes on food labels. Most foods contain more than one serving per container.  Eat the  recommended number of calories for your gender and activity level. For most active people, a daily total of 2,000 calories is appropriate. If you are trying to lose weight or are not very active, you may need to eat fewer calories. Talk with your health care provider or a dietitian about how many calories you need each day.  Choose healthy foods, such as:  Fruits and vegetables. At each meal, try to fill at least half of your plate with fruits and vegetables.  Whole grains, such as whole-wheat bread, brown rice, and quinoa.  Lean meats, such as chicken or fish.  Other healthy proteins, such as beans, eggs, or tofu.  Healthy fats, such as nuts, seeds, fatty fish, and olive oil.  Low-fat or fat-free dairy products.  Check food labels, and avoid food and drinks that:  Are high in calories.  Have added sugar.  Are high in sodium.  Have saturated fats or trans fats.  Cook foods in healthier ways, such as by baking, broiling, or grilling.  Make a meal plan for the week, and shop with a grocery list to help you stay on track with your purchases. Try to avoid going to the grocery store when you are hungry.  When grocery shopping, try to shop around the outside of the store first, where the fresh foods are. Doing this helps you avoid prepackaged foods, which can be high in sugar, salt (sodium), and fat.  Lifestyle    Exercise for 30 or more minutes on 5 or more days each week. Exercising may include brisk walking, yard work, biking, running, swimming, and team sports like basketball and soccer. Ask your health care provider which exercises are safe for you.  Do activities that strengthen the muscles, such as lifting weights or using resistance bands, on 2 or more days a week.  Do not use any products that contain nicotine or tobacco. These products include cigarettes, chewing tobacco, and vaping devices, such as e-cigarettes. If you need help quitting, ask your health care provider.  If you drink alcohol:  Limit how much you  have to:  0-1 drink a day for women who are not pregnant.  0-2 drinks a day for men.  Know how much alcohol is in a drink. In the [place] one drink equals one 12 oz bottle of beer (355 mL), one 5 oz glass of wine (148 mL), or one 1½ oz glass of hard liquor (44 mL).  Try to get 7-9 hours of sleep each night.  Other changes  Keep a food and activity journal to keep track of:  What you ate and how many calories you had. Remember to count the calories in sauces, dressings, and side dishes.  Whether you were active, and what exercises you did.  Your calorie, weight, and activity goals.  Check your weight regularly. Track any changes. If you notice that you have gained weight, make changes to your diet or activity routine.  Avoid taking weight-loss medicines or supplements. Talk to your health care provider before starting any new medicine or supplement.  Talk to your health care provider before trying any new diet or exercise plan.  Where to find more information  Talk with your health care provider or a dietitian about healthy eating and healthy lifestyle choices. You may also find information from:  [place] Department of Agriculture, MyPlate: www.choosemyplate.gov  American Heart Association: www.heart.org  Centers for Disease Control and Prevention: www.cdc.gov  Summary  Eating healthy, staying active, and having healthy habits can help to prevent unhealthy weight gain and lower your risk for health problems such as heart disease, diabetes, some types of cancer, and stroke.  Being overweight or obese can cause you to develop joint or bone problems, which can make it hard for you to stay active or do activities you enjoy.  You can prevent unhealthy weight gain by eating a healthy diet, exercising regularly, not smoking, limiting alcohol, and getting enough sleep.  Talk with your health care provider or a dietitian for guidance about healthy eating and healthy lifestyle choices.  This information is not intended to replace  advice given to you by your health care provider. Make sure you discuss any questions you have with your health care provider.  Document Revised: 07/15/2022 Document Reviewed: 07/15/2022  Elsevier Patient Education © 2022 [employer] Inc[employer]

## 2023-06-05 NOTE — PROGRESS NOTES
Subjective   Alexus Serrano is a 47 y.o. female.   Chief Complaint   Patient presents with    URI    Urinary Frequency       History of Present Illness  Hurts to breathe, cough.   URI   This is a new problem. The current episode started in the past 7 days. There has been no fever. Associated symptoms include congestion, coughing, headaches and a sore throat. She has tried acetaminophen for the symptoms.   Urinary Frequency   This is a new problem. The current episode started in the past 7 days. The quality of the pain is described as aching and burning. There has been no fever.      The following portions of the patient's history were reviewed and updated as appropriate: allergies, current medications, past family history, past medical history, past social history, past surgical history, and problem list.    Patient Active Problem List   Diagnosis    Fibromyalgia    DDD (degenerative disc disease), lumbar    Premenstrual dysphoric syndrome    Essential hypertension    Anxiety    Mild episode of recurrent major depressive disorder    GERD (gastroesophageal reflux disease)    Snoring    Right ovarian cyst    Class 2 obesity due to excess calories without serious comorbidity with body mass index (BMI) of 38.0 to 38.9 in adult    Stress incontinence    Daytime somnolence    Mixed hyperlipidemia    Hyperglycemia    Borderline diabetes       Current Outpatient Medications on File Prior to Visit   Medication Sig Dispense Refill    atorvastatin (LIPITOR) 10 MG tablet Take 1 tablet by mouth Daily. 30 tablet 12    coenzyme Q10 100 MG capsule Take 1 capsule by mouth Daily.      FLUoxetine (PROzac) 40 MG capsule Take 1 capsule by mouth Daily. 30 capsule 12    meloxicam (MOBIC) 15 MG tablet Take 1 tablet by mouth Daily. 30 tablet 12    metFORMIN (GLUCOPHAGE) 500 MG tablet Take 1 tablet by mouth Daily With Breakfast. 30 tablet 12    Omega-3 1000 MG capsule Take  by mouth.      omeprazole (priLOSEC) 20 MG capsule Take 1 capsule  "by mouth Daily. 30 capsule 4     No current facility-administered medications on file prior to visit.     Current outpatient and discharge medications have been reconciled for the patient.  Reviewed by: Alfredo Villela MD      No Known Allergies    Review of Systems   Constitutional:  Negative for fever.   HENT:  Positive for congestion and sore throat.    Respiratory:  Positive for cough.    All other systems reviewed and are negative.  I have reviewed and confirmed the accuracy of the ROS as documented by the MA/LPN/RN Alfredo Villela MD    Objective   Visit Vitals  /84 (BP Location: Right arm, Patient Position: Sitting, Cuff Size: Adult)   Pulse 78   Temp 97.5 °F (36.4 °C)   Resp 18   Ht 167.6 cm (66\")   Wt 109 kg (240 lb 12.8 oz)   SpO2 96%   BMI 38.87 kg/m²      **  Physical Exam  Constitutional:       Appearance: She is well-developed.   HENT:      Head: Normocephalic and atraumatic.      Right Ear: External ear normal.      Left Ear: External ear normal.      Nose: Nose normal.   Eyes:      Pupils: Pupils are equal, round, and reactive to light.   Cardiovascular:      Rate and Rhythm: Normal rate and regular rhythm.      Heart sounds: Normal heart sounds.   Pulmonary:      Breath sounds: Decreased breath sounds and wheezing present.   Abdominal:      General: Bowel sounds are normal.      Palpations: Abdomen is soft.   Musculoskeletal:         General: Normal range of motion.      Cervical back: Normal range of motion and neck supple.   Skin:     General: Skin is warm and dry.   Neurological:      Mental Status: She is alert and oriented to person, place, and time.   Psychiatric:         Behavior: Behavior normal.         Thought Content: Thought content normal.         Judgment: Judgment normal.     Derm Physical Exam    Diagnoses and all orders for this visit:    1. Urinary frequency (Primary)  Comments:  urine dip neg for evidence of UTI  Orders:  -     POC Urinalysis Dipstick, " Automated    2. Bronchitis  -     azithromycin (ZITHROMAX) 250 MG tablet; Take 2 tablets the first day, then 1 tablet daily for 4 days.  Dispense: 6 tablet; Refill: 0  -     predniSONE (DELTASONE) 20 MG tablet; Take 1 tablet by mouth Daily for 13 days. TID x 3 days, BID x 3 days, QD x 3 days, 1/2 tab daily x 4 days  Dispense: 20 tablet; Refill: 0  -     albuterol sulfate  (90 Base) MCG/ACT inhaler; Inhale 2 puffs Every 4 (Four) Hours As Needed for Wheezing.  Dispense: 8 g; Refill: 0     Findings discussed. All questions answered.  Medication and medication adverse effects discussed.  Drug education given and explained to patient. Patient verbalized understanding.  Dc3 Follow-up for routine health maintenance as indicated.   Class 2 Severe Obesity (BMI >=35 and <=39.9). Obesity-related health conditions include the following: hypertension and dyslipidemias. Obesity is unchanged. BMI is is above average; BMI management plan is completed. We discussed portion control and increasing exercise.    Expected course, medications, and adverse effects discussed as appropriate.  Call or return if worsening or persistent symptoms.     This document is intended for medical professional use only.

## 2023-06-15 ENCOUNTER — HOSPITAL ENCOUNTER (OUTPATIENT)
Dept: GENERAL RADIOLOGY | Facility: HOSPITAL | Age: 48
Discharge: HOME | End: 2023-06-15
Admitting: FAMILY MEDICINE
Payer: COMMERCIAL

## 2023-06-15 ENCOUNTER — OFFICE VISIT (OUTPATIENT)
Dept: FAMILY MEDICINE CLINIC | Facility: CLINIC | Age: 48
End: 2023-06-15
Payer: COMMERCIAL

## 2023-06-15 ENCOUNTER — TELEPHONE (OUTPATIENT)
Dept: FAMILY MEDICINE CLINIC | Facility: CLINIC | Age: 48
End: 2023-06-15
Payer: COMMERCIAL

## 2023-06-15 VITALS
BODY MASS INDEX: 38.25 KG/M2 | SYSTOLIC BLOOD PRESSURE: 118 MMHG | OXYGEN SATURATION: 96 % | WEIGHT: 238 LBS | HEIGHT: 66 IN | DIASTOLIC BLOOD PRESSURE: 82 MMHG | RESPIRATION RATE: 18 BRPM | HEART RATE: 91 BPM | TEMPERATURE: 97.5 F

## 2023-06-15 DIAGNOSIS — B96.89 ACUTE BACTERIAL BRONCHITIS: ICD-10-CM

## 2023-06-15 DIAGNOSIS — J45.41 MODERATE PERSISTENT REACTIVE AIRWAY DISEASE WITH ACUTE EXACERBATION: ICD-10-CM

## 2023-06-15 DIAGNOSIS — J06.9 VIRAL UPPER RESPIRATORY TRACT INFECTION: Primary | ICD-10-CM

## 2023-06-15 DIAGNOSIS — J20.8 ACUTE BACTERIAL BRONCHITIS: ICD-10-CM

## 2023-06-15 DIAGNOSIS — E66.09 CLASS 2 OBESITY DUE TO EXCESS CALORIES WITHOUT SERIOUS COMORBIDITY WITH BODY MASS INDEX (BMI) OF 38.0 TO 38.9 IN ADULT: ICD-10-CM

## 2023-06-15 PROBLEM — J45.901 REACTIVE AIRWAY DISEASE WITH ACUTE EXACERBATION: Status: ACTIVE | Noted: 2023-06-15

## 2023-06-15 LAB
EXPIRATION DATE: NORMAL
FLUAV AG UPPER RESP QL IA.RAPID: NOT DETECTED
FLUBV AG UPPER RESP QL IA.RAPID: NOT DETECTED
INTERNAL CONTROL: NORMAL
Lab: NORMAL
SARS-COV-2 AG UPPER RESP QL IA.RAPID: NOT DETECTED

## 2023-06-15 PROCEDURE — 71046 X-RAY EXAM CHEST 2 VIEWS: CPT

## 2023-06-15 RX ORDER — MONTELUKAST SODIUM 10 MG/1
10 TABLET ORAL NIGHTLY
Qty: 30 TABLET | Refills: 12 | Status: SHIPPED | OUTPATIENT
Start: 2023-06-15

## 2023-06-15 NOTE — ASSESSMENT & PLAN NOTE
New  Most likely copd. Poor airqaulity may be causing or human metapneumovirus    No wheezing now but she is using her rescue inhaler more often. Will add trelegy and see her next week for follow up.   Xray ordered

## 2023-06-15 NOTE — TELEPHONE ENCOUNTER
Caller: Alexus Serrano    Relationship: Self    Best call back number:     [phone] (Home)       What medication are you requesting: REPEAT MEDS FROM OFFICE VISIT 6-5-23       What are your current symptoms: WHEEZING, TIRED    How long have you been experiencing symptoms: OVER 2 WKS     Have you had these symptoms before:    [] Yes  [] No    Have you been treated for these symptoms before:   [] Yes  [] No    If a prescription is needed, what is your preferred pharmacy and phone number:    [id number] [place] [place] [address] [address] - [phone] [facility] [phone]  [phone]     Additional notes:    SOONEST AVAILABLE APPT WOULD BE MONDAY   WITH DR AVERY    PATIENT SAW DR VELÁZQUEZ LAST TIME

## 2023-06-15 NOTE — ASSESSMENT & PLAN NOTE
Patient's (Body mass index is 38.41 kg/m².) indicates that they are obese (BMI >30) with health conditions that include hypertension and dyslipidemias . Weight is improving with lifestyle modifications. BMI  is above average; BMI management plan is completed. We discussed portion control and increasing exercise.

## 2023-06-15 NOTE — PROGRESS NOTES
Subjective   Alexus Serrano is a 47 y.o. female. Presents to [facility]    Chief Complaint   Patient presents with    URI       URI   This is a new problem. The current episode started 1 to 4 weeks ago. The problem has been gradually worsening. There has been no fever. Associated symptoms include coughing, headaches, nausea, a sore throat and wheezing. Pertinent negatives include no chest pain, ear pain, rhinorrhea, sinus pain or sneezing. She has tried decongestant (zpack, prednisone) for the symptoms. The treatment provided mild relief.      She was better but it started to bother her again after the steroids and zpak were done.     She is a past smoker      I personally reviewed and updated the patient's allergies, medications, problem list, and past medical, surgical, social, and family history. I have reviewed and confirmed the accuracy of the History of Present Illness and Review of Symptoms as documented by the MA/LPN/RN. Mela Casey MD    Allergies:  No Known Allergies    Social History:  Social History     Socioeconomic History    Marital status: Single   Tobacco Use    Smoking status: Every Day     Types: Cigarettes     Start date: 2021    Tobacco comments:     vapes    Vaping Use    Vaping Use: Every day    Substances: Nicotine, Flavoring    Devices: Pre-filled or refillable cartridge   Substance and Sexual Activity    Alcohol use: Not Currently    Drug use: Not Currently     Types: Methamphetamines     Comment: clean for 27 months     Sexual activity: Not Currently       Family History:  Family History   Problem Relation Age of Onset    Cancer Mother 61        lung    Colon cancer Maternal Grandmother 62    Breast cancer Paternal Aunt 40         from it    Breast cancer Cousin 51         from it    Ovarian cancer Neg Hx        Past Medical History :  Patient Active Problem List   Diagnosis    Fibromyalgia    DDD (degenerative disc disease), lumbar    Premenstrual  "dysphoric syndrome    Essential hypertension    Anxiety    Mild episode of recurrent major depressive disorder    GERD (gastroesophageal reflux disease)    Snoring    Right ovarian cyst    Class 2 obesity due to excess calories without serious comorbidity with body mass index (BMI) of 38.0 to 38.9 in adult    Stress incontinence    Daytime somnolence    Mixed hyperlipidemia    Hyperglycemia    Borderline diabetes    Viral upper respiratory tract infection    Acute bacterial bronchitis    Reactive airway disease with acute exacerbation       Medication List:    Current Outpatient Medications:     albuterol sulfate  (90 Base) MCG/ACT inhaler, Inhale 2 puffs Every 4 (Four) Hours As Needed for Wheezing., Disp: 8 g, Rfl: 0    atorvastatin (LIPITOR) 10 MG tablet, Take 1 tablet by mouth Daily., Disp: 30 tablet, Rfl: 12    coenzyme Q10 100 MG capsule, Take 1 capsule by mouth Daily., Disp: , Rfl:     FLUoxetine (PROzac) 40 MG capsule, Take 1 capsule by mouth Daily., Disp: 30 capsule, Rfl: 12    meloxicam (MOBIC) 15 MG tablet, Take 1 tablet by mouth Daily., Disp: 30 tablet, Rfl: 12    metFORMIN (GLUCOPHAGE) 500 MG tablet, Take 1 tablet by mouth Daily With Breakfast., Disp: 30 tablet, Rfl: 12    Omega-3 1000 MG capsule, Take  by mouth., Disp: , Rfl:     omeprazole (priLOSEC) 20 MG capsule, Take 1 capsule by mouth Daily., Disp: 30 capsule, Rfl: 4    montelukast (SINGULAIR) 10 MG tablet, Take 1 tablet by mouth Every Night., Disp: 30 tablet, Rfl: 12    Past Surgical History:  No past surgical history on file.      Physical Exam:      Vital Signs:    Vitals:    06/15/23 1451   BP: 118/82   Pulse: 91   Resp: 18   Temp: 97.5 °F (36.4 °C)   SpO2: 96%        /82 (BP Location: Right arm, Patient Position: Sitting, Cuff Size: Adult)   Pulse 91   Temp 97.5 °F (36.4 °C) (Temporal)   Resp 18   Ht 167.6 cm (66\")   Wt 108 kg (238 lb)   LMP 05/28/2023   SpO2 96% Comment: room air  Breastfeeding No   BMI 38.41 kg/m² "     Wt Readings from Last 3 Encounters:   06/15/23 108 kg (238 lb)   06/05/23 109 kg (240 lb 12.8 oz)   05/15/23 109 kg (241 lb)       Result Review :   The following data was reviewed by: Mela Casey MD on 06/15/2023:            Latest Reference Range & Units 06/15/23 15:08   SARS Antigen Not Detected, Presumptive Negative  Not Detected   Expiration Date  04/23/2024   Lot Number  [id number]   Influenza A Antigen DANIELLE Not Detected  Not Detected   Influenza B Antigen DANIELLE Not Detected  Not Detected     Physical Exam  Vitals reviewed.   Constitutional:       Appearance: Normal appearance. She is well-developed.   HENT:      Head: Normocephalic and atraumatic.      Right Ear: External ear normal. No decreased hearing noted. No tenderness. No middle ear effusion. Tympanic membrane is not injected, erythematous, retracted or bulging.      Left Ear: External ear normal. No decreased hearing noted. No tenderness.  No middle ear effusion. Tympanic membrane is not injected, erythematous, retracted or bulging.      Nose: Nose normal. No rhinorrhea.      Mouth/Throat:      Pharynx: No oropharyngeal exudate or posterior oropharyngeal erythema.   Eyes:      General:         Right eye: No discharge.         Left eye: No discharge.   Cardiovascular:      Rate and Rhythm: Normal rate and regular rhythm.      Heart sounds: Normal heart sounds. No murmur heard.    No friction rub. No gallop.   Pulmonary:      Effort: Pulmonary effort is normal. No respiratory distress.      Breath sounds: Normal breath sounds. No wheezing or rales.   Lymphadenopathy:      Cervical: No cervical adenopathy.   Skin:     General: Skin is warm and dry.      Findings: No rash.   Neurological:      Mental Status: She is alert and oriented to person, place, and time.      Coordination: Coordination normal.      Gait: Gait normal.   Psychiatric:         Behavior: Behavior is cooperative.       Assessment and Plan:  Problems Addressed this Visit          ENT     Viral upper respiratory tract infection - Primary     Negative for covid         Relevant Orders    POCT SARS-CoV-2 Antigen DANIELLE (Completed)       Endocrine and Metabolic    Class 2 obesity due to excess calories without serious comorbidity with body mass index (BMI) of 38.0 to 38.9 in adult     Patient's (Body mass index is 38.41 kg/m².) indicates that they are obese (BMI >30) with health conditions that include hypertension and dyslipidemias . Weight is improving with lifestyle modifications. BMI  is above average; BMI management plan is completed. We discussed portion control and increasing exercise.             Pulmonary and Pneumonias    Acute bacterial bronchitis     She was better but it started to bother her again after the steroids and zpak were done.     increase fluids, tylenol for fever, motrin for pain. Humidifier to help with congestion and to sleep at night. Dicussed OTC meds, gargle with warm salt water. If there is recurrent fever, shortness of breath, lethargy, advised to come in to the office or go to the ER.             Relevant Medications    montelukast (SINGULAIR) 10 MG tablet    Other Relevant Orders    XR Chest 2 View    Reactive airway disease with acute exacerbation     New  Most likely copd. Poor airqaulity may be causing or human metapneumovirus    No wheezing now but she is using her rescue inhaler more often. Will add trelegy and see her next week for follow up.   Xray ordered               Relevant Medications    montelukast (SINGULAIR) 10 MG tablet     Diagnoses         Codes Comments    Viral upper respiratory tract infection    -  Primary ICD-10-CM: J06.9  ICD-9-CM: 465.9     Class 2 obesity due to excess calories without serious comorbidity with body mass index (BMI) of 38.0 to 38.9 in adult     ICD-10-CM: E66.09, Z68.38  ICD-9-CM: 278.00, V85.38     Acute bacterial bronchitis     ICD-10-CM: J20.8, B96.89  ICD-9-CM: 466.0, 041.9     Moderate persistent reactive airway disease  with acute exacerbation     ICD-10-CM: J45.41  ICD-9-CM: 493.92                     An After Visit Summary and PPPS were given to the patient.

## 2023-06-15 NOTE — ASSESSMENT & PLAN NOTE
She was better but it started to bother her again after the steroids and zpak were done.     increase fluids, tylenol for fever, motrin for pain. Humidifier to help with congestion and to sleep at night. Dicussed OTC meds, gargle with warm salt water. If there is recurrent fever, shortness of breath, lethargy, advised to come in to the office or go to the ER.

## 2023-06-16 DIAGNOSIS — R93.89 ABNORMAL X-RAY: Primary | ICD-10-CM

## 2023-06-16 DIAGNOSIS — R05.3 CHRONIC COUGH: ICD-10-CM

## 2023-06-19 PROBLEM — R06.02 SOB (SHORTNESS OF BREATH): Status: ACTIVE | Noted: 2023-06-19

## 2023-06-21 PROBLEM — K44.9 HIATAL HERNIA: Status: ACTIVE | Noted: 2023-06-21

## 2023-08-14 ENCOUNTER — OFFICE VISIT (OUTPATIENT)
Dept: FAMILY MEDICINE CLINIC | Facility: CLINIC | Age: 48
End: 2023-08-14
Payer: COMMERCIAL

## 2023-08-14 VITALS
HEIGHT: 66 IN | SYSTOLIC BLOOD PRESSURE: 116 MMHG | DIASTOLIC BLOOD PRESSURE: 84 MMHG | BODY MASS INDEX: 38.12 KG/M2 | TEMPERATURE: 97.1 F | WEIGHT: 237.2 LBS | RESPIRATION RATE: 18 BRPM | OXYGEN SATURATION: 97 % | HEART RATE: 90 BPM

## 2023-08-14 DIAGNOSIS — F33.1 MODERATE EPISODE OF RECURRENT MAJOR DEPRESSIVE DISORDER: Primary | ICD-10-CM

## 2023-08-14 DIAGNOSIS — R73.03 BORDERLINE DIABETES: ICD-10-CM

## 2023-08-14 DIAGNOSIS — N83.201 RIGHT OVARIAN CYST: ICD-10-CM

## 2023-08-14 DIAGNOSIS — E78.2 MIXED HYPERLIPIDEMIA: ICD-10-CM

## 2023-08-14 DIAGNOSIS — I10 ESSENTIAL HYPERTENSION: ICD-10-CM

## 2023-08-14 DIAGNOSIS — E66.09 CLASS 2 OBESITY DUE TO EXCESS CALORIES WITHOUT SERIOUS COMORBIDITY WITH BODY MASS INDEX (BMI) OF 38.0 TO 38.9 IN ADULT: ICD-10-CM

## 2023-08-14 DIAGNOSIS — F41.9 ANXIETY: ICD-10-CM

## 2023-08-14 PROBLEM — R73.9 HYPERGLYCEMIA: Status: RESOLVED | Noted: 2023-02-10 | Resolved: 2023-08-14

## 2023-08-14 PROBLEM — J20.8 ACUTE BACTERIAL BRONCHITIS: Status: RESOLVED | Noted: 2023-06-15 | Resolved: 2023-08-14

## 2023-08-14 PROBLEM — B96.89 ACUTE BACTERIAL BRONCHITIS: Status: RESOLVED | Noted: 2023-06-15 | Resolved: 2023-08-14

## 2023-08-14 PROCEDURE — 99214 OFFICE O/P EST MOD 30 MIN: CPT | Performed by: FAMILY MEDICINE

## 2023-08-14 RX ORDER — BUPROPION HYDROCHLORIDE 75 MG/1
75 TABLET ORAL 2 TIMES DAILY
Qty: 60 TABLET | Refills: 1 | Status: SHIPPED | OUTPATIENT
Start: 2023-08-14

## 2023-08-14 NOTE — ASSESSMENT & PLAN NOTE
Patient's (Body mass index is 38.29 kg/mý.) indicates that they are obese (BMI >30) with health conditions that include hypertension and dyslipidemias . Weight is improving with lifestyle modifications. BMI  is above average; BMI management plan is completed. We discussed portion control and increasing exercise.

## 2023-08-14 NOTE — PROGRESS NOTES
Subjective   Alexus Serrano is a 47 y.o. female. Presents to [facility]    Chief Complaint   Patient presents with    Obesity    Anxiety       Obesity  This is a chronic problem. The current episode started more than 1 year ago. Pertinent negatives include no abdominal pain, fatigue, nausea, vomiting or weakness. Nothing aggravates the symptoms.   Anxiety  Presents for follow-up visit. Symptoms include depressed mood, excessive worry, insomnia and irritability. Patient reports no nausea or restlessness. Symptoms occur most days. The severity of symptoms is moderate. The patient sleeps 6 hours per night. The quality of sleep is poor. Nighttime awakenings: several.          I personally reviewed and updated the patient's allergies, medications, problem list, and past medical, surgical, social, and family history. I have reviewed and confirmed the accuracy of the History of Present Illness and Review of Symptoms as documented by the MA/LPN/RN. Mela Casey MD    Allergies:  No Known Allergies    Social History:  Social History     Socioeconomic History    Marital status: Single   Tobacco Use    Smoking status: Every Day     Types: Cigarettes     Start date: 2021    Tobacco comments:     vapes    Vaping Use    Vaping Use: Every day    Substances: Nicotine, Flavoring    Devices: Pre-filled or refillable cartridge   Substance and Sexual Activity    Alcohol use: Not Currently    Drug use: Not Currently     Types: Methamphetamines     Comment: clean for 27 months     Sexual activity: Not Currently       Family History:  Family History   Problem Relation Age of Onset    Cancer Mother 61        lung    Colon cancer Maternal Grandmother 62    Breast cancer Paternal Aunt 40         from it    Breast cancer Cousin 51         from it    Ovarian cancer Neg Hx        Past Medical History :  Patient Active Problem List   Diagnosis    Fibromyalgia    DDD (degenerative disc disease), lumbar     "Premenstrual dysphoric syndrome    Essential hypertension    Anxiety    Moderate episode of recurrent major depressive disorder    GERD (gastroesophageal reflux disease)    Snoring    Right ovarian cyst    Class 2 obesity due to excess calories without serious comorbidity with body mass index (BMI) of 38.0 to 38.9 in adult    Stress incontinence    Daytime somnolence    Mixed hyperlipidemia    Borderline diabetes    Reactive airway disease with acute exacerbation    SOB (shortness of breath)    Hiatal hernia       Medication List:    Current Outpatient Medications:     albuterol sulfate  (90 Base) MCG/ACT inhaler, Inhale 2 puffs Every 4 (Four) Hours As Needed for Wheezing., Disp: 8 g, Rfl: 0    atorvastatin (LIPITOR) 10 MG tablet, Take 1 tablet by mouth Daily., Disp: 30 tablet, Rfl: 12    budesonide-formoterol (Symbicort) 80-4.5 MCG/ACT inhaler, Inhale 2 puffs 2 (Two) Times a Day., Disp: 6.9 g, Rfl: 12    coenzyme Q10 100 MG capsule, Take 1 capsule by mouth Daily., Disp: , Rfl:     meloxicam (MOBIC) 15 MG tablet, Take 1 tablet by mouth Daily., Disp: 30 tablet, Rfl: 12    metFORMIN (GLUCOPHAGE) 500 MG tablet, Take 1 tablet by mouth Daily With Breakfast., Disp: 30 tablet, Rfl: 12    montelukast (SINGULAIR) 10 MG tablet, Take 1 tablet by mouth Every Night., Disp: 30 tablet, Rfl: 12    Omega-3 1000 MG capsule, Take  by mouth., Disp: , Rfl:     omeprazole (priLOSEC) 20 MG capsule, Take 1 capsule by mouth Daily., Disp: 30 capsule, Rfl: 4    buPROPion (WELLBUTRIN) 75 MG tablet, Take 1 tablet by mouth 2 (Two) Times a Day., Disp: 60 tablet, Rfl: 1    Past Surgical History:  No past surgical history on file.      Physical Exam:      Vital Signs:    Vitals:    08/14/23 1617   BP: 116/84   Pulse: 90   Resp: 18   Temp: 97.1 øF (36.2 øC)   SpO2: 97%        /84 (BP Location: Right arm, Patient Position: Sitting, Cuff Size: Adult)   Pulse 90   Temp 97.1 øF (36.2 øC) (Temporal)   Resp 18   Ht 167.6 cm (66\")   Wt " 108 kg (237 lb 3.2 oz)   LMP 07/26/2023   SpO2 97% Comment: room air  BMI 38.29 kg/mý     Wt Readings from Last 3 Encounters:   08/14/23 108 kg (237 lb 3.2 oz)   06/19/23 109 kg (239 lb 6.4 oz)   06/15/23 108 kg (238 lb)       Result Review :                Physical Exam  Vitals reviewed.   Constitutional:       Appearance: Normal appearance. She is well-developed.   HENT:      Head: Normocephalic and atraumatic.   Eyes:      General:         Right eye: No discharge.         Left eye: No discharge.   Cardiovascular:      Rate and Rhythm: Normal rate and regular rhythm.      Heart sounds: Normal heart sounds. No murmur heard.    No friction rub. No gallop.   Pulmonary:      Effort: Pulmonary effort is normal. No respiratory distress.      Breath sounds: Normal breath sounds. No wheezing or rales.   Skin:     General: Skin is warm and dry.      Findings: No rash.   Neurological:      Mental Status: She is alert and oriented to person, place, and time.      Coordination: Coordination normal.      Gait: Gait normal.   Psychiatric:         Behavior: Behavior is cooperative.       Assessment and Plan:  Problems Addressed this Visit          Cardiac and Vasculature    Essential hypertension     Hypertension is improving with lifestyle modifications.  Continue current treatment regimen.  Dietary sodium restriction.  Weight loss.  Blood pressure will be reassessed in 3 months.         Mixed hyperlipidemia    Relevant Orders    Comprehensive Metabolic Panel    Lipid Panel With / Chol / HDL Ratio       Endocrine and Metabolic    Class 2 obesity due to excess calories without serious comorbidity with body mass index (BMI) of 38.0 to 38.9 in adult     Patient's (Body mass index is 38.29 kg/mý.) indicates that they are obese (BMI >30) with health conditions that include hypertension and dyslipidemias . Weight is improving with lifestyle modifications. BMI  is above average; BMI management plan is completed. We discussed  portion control and increasing exercise.          Borderline diabetes     Will check a1C         Relevant Orders    Hemoglobin A1c       Genitourinary and Reproductive     Right ovarian cyst     Check ultrasound         Relevant Orders    US Pelvis Transvaginal Non OB       Mental Health    Anxiety    Relevant Medications    buPROPion (WELLBUTRIN) 75 MG tablet    Moderate episode of recurrent major depressive disorder - Primary     Patient's depression is recurrent and is moderate without psychosis. Their depression is currently active and the condition is worsening. This will be reassessed in 4 weeks. F/U as described:patient was prescribed an antidepressant medicine.         Relevant Medications    buPROPion (WELLBUTRIN) 75 MG tablet     Diagnoses         Codes Comments    Moderate episode of recurrent major depressive disorder    -  Primary ICD-10-CM: F33.1  ICD-9-CM: 296.32     Anxiety     ICD-10-CM: F41.9  ICD-9-CM: 300.00     Right ovarian cyst     ICD-10-CM: N83.201  ICD-9-CM: 620.2     Borderline diabetes     ICD-10-CM: R73.03  ICD-9-CM: 790.29     Mixed hyperlipidemia     ICD-10-CM: E78.2  ICD-9-CM: 272.2     Essential hypertension     ICD-10-CM: I10  ICD-9-CM: 401.9     Class 2 obesity due to excess calories without serious comorbidity with body mass index (BMI) of 38.0 to 38.9 in adult     ICD-10-CM: E66.09, Z68.38  ICD-9-CM: 278.00, V85.38                     An After Visit Summary and PPPS were given to the patient.

## 2023-08-20 PROBLEM — J06.9 VIRAL UPPER RESPIRATORY TRACT INFECTION: Status: RESOLVED | Noted: 2023-06-15 | Resolved: 2023-08-20

## 2023-08-21 NOTE — ASSESSMENT & PLAN NOTE
Hypertension is improving with lifestyle modifications.  Continue current treatment regimen.  Dietary sodium restriction.  Weight loss.  Blood pressure will be reassessed in 3 months.

## 2023-08-25 LAB
ALBUMIN SERPL-MCNC: 4.3 G/DL (ref 3.9–4.9)
ALBUMIN/GLOB SERPL: 1.4 {RATIO} (ref 1.2–2.2)
ALP SERPL-CCNC: 105 IU/L (ref 44–121)
ALT SERPL-CCNC: 18 IU/L (ref 0–32)
AST SERPL-CCNC: 16 IU/L (ref 0–40)
BILIRUB SERPL-MCNC: <0.2 MG/DL (ref 0–1.2)
BUN SERPL-MCNC: 16 MG/DL (ref 6–24)
BUN/CREAT SERPL: 24 (ref 9–23)
CALCIUM SERPL-MCNC: 10.1 MG/DL (ref 8.7–10.2)
CHLORIDE SERPL-SCNC: 103 MMOL/L (ref 96–106)
CHOLEST SERPL-MCNC: 161 MG/DL (ref 100–199)
CHOLEST/HDLC SERPL: 2.9 RATIO (ref 0–4.4)
CO2 SERPL-SCNC: 22 MMOL/L (ref 20–29)
CREAT SERPL-MCNC: 0.67 MG/DL (ref 0.57–1)
EGFRCR SERPLBLD CKD-EPI 2021: 108 ML/MIN/1.73
GLOBULIN SER CALC-MCNC: 3 G/DL (ref 1.5–4.5)
GLUCOSE SERPL-MCNC: 92 MG/DL (ref 70–99)
HBA1C MFR BLD: 5.7 % (ref 4.8–5.6)
HDLC SERPL-MCNC: 55 MG/DL
LDLC SERPL CALC-MCNC: 77 MG/DL (ref 0–99)
POTASSIUM SERPL-SCNC: 5.2 MMOL/L (ref 3.5–5.2)
PROT SERPL-MCNC: 7.3 G/DL (ref 6–8.5)
SODIUM SERPL-SCNC: 140 MMOL/L (ref 134–144)
TRIGL SERPL-MCNC: 172 MG/DL (ref 0–149)
VLDLC SERPL CALC-MCNC: 29 MG/DL (ref 5–40)

## 2023-09-12 NOTE — PROGRESS NOTES
Subjective   Alexus Serrano is a 47 y.o. female. Presents to [facility]    Chief Complaint   Patient presents with    Anxiety       Anxiety  Presents for follow-up visit. Symptoms include depressed mood (improving) and insomnia. Patient reports no excessive worry, irritability, nervous/anxious behavior, panic or restlessness. Symptoms occur most days. The severity of symptoms is moderate. The patient sleeps 6 hours per night. The quality of sleep is poor. Nighttime awakenings: several.          I personally reviewed and updated the patient's allergies, medications, problem list, and past medical, surgical, social, and family history. I have reviewed and confirmed the accuracy of the History of Present Illness and Review of Symptoms as documented by the MA/LPN/RN. Mela Casey MD    Allergies:  No Known Allergies    Social History:  Social History     Socioeconomic History    Marital status: Single   Tobacco Use    Smoking status: Every Day     Types: Cigarettes     Start date: 2021    Tobacco comments:     vapes    Vaping Use    Vaping Use: Every day    Substances: Nicotine, Flavoring    Devices: Pre-filled or refillable cartridge   Substance and Sexual Activity    Alcohol use: Not Currently    Drug use: Not Currently     Types: Methamphetamines     Comment: clean for 27 months     Sexual activity: Not Currently       Family History:  Family History   Problem Relation Age of Onset    Cancer Mother 61        lung    Colon cancer Maternal Grandmother 62    Breast cancer Paternal Aunt 40         from it    Breast cancer Cousin 51         from it    Ovarian cancer Neg Hx        Past Medical History :  Patient Active Problem List   Diagnosis    Fibromyalgia    DDD (degenerative disc disease), lumbar    Premenstrual dysphoric syndrome    Essential hypertension    Anxiety    Moderate episode of recurrent major depressive disorder    GERD (gastroesophageal reflux disease)    Snoring     "Right ovarian cyst    Class 2 obesity due to excess calories without serious comorbidity with body mass index (BMI) of 39.0 to 39.9 in adult    Stress incontinence    Daytime somnolence    Mixed hyperlipidemia    Borderline diabetes    Reactive airway disease with acute exacerbation    SOB (shortness of breath)    Hiatal hernia       Medication List:    Current Outpatient Medications:     albuterol sulfate  (90 Base) MCG/ACT inhaler, Inhale 2 puffs Every 4 (Four) Hours As Needed for Wheezing., Disp: 8 g, Rfl: 0    atorvastatin (LIPITOR) 10 MG tablet, Take 1 tablet by mouth Daily., Disp: 30 tablet, Rfl: 12    budesonide-formoterol (Symbicort) 80-4.5 MCG/ACT inhaler, Inhale 2 puffs 2 (Two) Times a Day., Disp: 6.9 g, Rfl: 12    buPROPion (WELLBUTRIN) 75 MG tablet, Take 1 tablet by mouth 2 (Two) Times a Day., Disp: 60 tablet, Rfl: 12    coenzyme Q10 100 MG capsule, Take 1 capsule by mouth Daily., Disp: , Rfl:     meloxicam (MOBIC) 15 MG tablet, Take 1 tablet by mouth Daily., Disp: 30 tablet, Rfl: 12    metFORMIN (GLUCOPHAGE) 500 MG tablet, Take 1 tablet by mouth Daily With Breakfast., Disp: 30 tablet, Rfl: 12    montelukast (SINGULAIR) 10 MG tablet, Take 1 tablet by mouth Every Night., Disp: 30 tablet, Rfl: 12    Omega-3 1000 MG capsule, Take  by mouth., Disp: , Rfl:     omeprazole (priLOSEC) 20 MG capsule, Take 1 capsule by mouth Daily., Disp: 30 capsule, Rfl: 4    Past Surgical History:  No past surgical history on file.      Physical Exam:      Vital Signs:    Vitals:    09/14/23 1615   BP: 124/84   Pulse: 68   Resp: 18   Temp: 97.7 °F (36.5 °C)   SpO2: 98%        /84 (BP Location: Right arm, Patient Position: Sitting, Cuff Size: Adult)   Pulse 68   Temp 97.7 °F (36.5 °C) (Temporal)   Resp 18   Ht 167.6 cm (66\")   Wt 110 kg (241 lb 9.6 oz)   LMP 08/20/2023   SpO2 98% Comment: room air  BMI 39.00 kg/m²     Wt Readings from Last 3 Encounters:   09/14/23 110 kg (241 lb 9.6 oz)   08/14/23 108 kg (237 " lb 3.2 oz)   06/19/23 109 kg (239 lb 6.4 oz)       Result Review :   The following data was reviewed by: Mela Casey MD on 09/14/2023:            Latest Reference Range & Units 08/24/23 08:45   Sodium 134 - 144 mmol/L 140   Potassium 3.5 - 5.2 mmol/L 5.2   Chloride 96 - 106 mmol/L 103   CO2 20 - 29 mmol/L 22   BUN 6 - 24 mg/dL 16   Creatinine 0.57 - 1.00 mg/dL 0.67   BUN/Creatinine Ratio 9 - 23  24 (H)   EGFR Result >59 mL/min/1.73 108   Glucose 70 - 99 mg/dL 92   Calcium 8.7 - 10.2 mg/dL 10.1   Alkaline Phosphatase 44 - 121 IU/L 105   Total Protein 6.0 - 8.5 g/dL 7.3   Albumin 3.9 - 4.9 g/dL 4.3   A/G Ratio 1.2 - 2.2  1.4   AST (SGOT) 0 - 40 IU/L 16   ALT (SGPT) 0 - 32 IU/L 18   Total Bilirubin 0.0 - 1.2 mg/dL <0.2   Hemoglobin A1C 4.8 - 5.6 % 5.7 (H)   Total Cholesterol 100 - 199 mg/dL 161   HDL Cholesterol >39 mg/dL 55   LDL Cholesterol  0 - 99 mg/dL 77   Triglycerides 0 - 149 mg/dL 172 (H)   Chol/HDL Ratio 0.0 - 4.4 ratio 2.9   VLDL Cholesterol Carlos 5 - 40 mg/dL 29   Globulin 1.5 - 4.5 g/dL 3.0   (H): Data is abnormally high  Physical Exam  Vitals reviewed.   Constitutional:       Appearance: Normal appearance. She is well-developed.   HENT:      Head: Normocephalic and atraumatic.   Eyes:      General:         Right eye: No discharge.         Left eye: No discharge.   Cardiovascular:      Rate and Rhythm: Normal rate and regular rhythm.      Heart sounds: Normal heart sounds. No murmur heard.    No friction rub. No gallop.   Pulmonary:      Effort: Pulmonary effort is normal. No respiratory distress.      Breath sounds: Normal breath sounds. No wheezing or rales.   Skin:     General: Skin is warm and dry.      Findings: No rash.   Neurological:      Mental Status: She is alert and oriented to person, place, and time.      Coordination: Coordination normal.      Gait: Gait normal.   Psychiatric:         Behavior: Behavior is cooperative.     Assessment and Plan:  Problems Addressed this Visit           Endocrine and Metabolic    Class 2 obesity due to excess calories without serious comorbidity with body mass index (BMI) of 39.0 to 39.9 in adult     Patient's (Body mass index is 39 kg/m².) indicates that they are obese (BMI >30) with health conditions that include hypertension and dyslipidemias . Weight is worsening. BMI  is above average; BMI management plan is completed. We discussed portion control and increasing exercise.          Borderline diabetes     Improving  Continue with diet changes            Mental Health    Anxiety - Primary     Much better with wellbutrin  Continue current medication         Relevant Medications    buPROPion (WELLBUTRIN) 75 MG tablet    Moderate episode of recurrent major depressive disorder     Patient's depression is single episode and is mild without psychosis. Their depression is currently active and the condition is improving with treatment. This will be reassessed at the next regular appointment. F/U as described:patient will continue current medication therapy.         Relevant Medications    buPROPion (WELLBUTRIN) 75 MG tablet     Diagnoses         Codes Comments    Anxiety    -  Primary ICD-10-CM: F41.9  ICD-9-CM: 300.00     Class 2 obesity due to excess calories without serious comorbidity with body mass index (BMI) of 39.0 to 39.9 in adult     ICD-10-CM: E66.09, Z68.39  ICD-9-CM: 278.00, V85.39     Moderate episode of recurrent major depressive disorder     ICD-10-CM: F33.1  ICD-9-CM: 296.32     Borderline diabetes     ICD-10-CM: R73.03  ICD-9-CM: 790.29                     An After Visit Summary and PPPS were given to the patient.

## 2023-09-14 ENCOUNTER — OFFICE VISIT (OUTPATIENT)
Dept: FAMILY MEDICINE CLINIC | Facility: CLINIC | Age: 48
End: 2023-09-14
Payer: COMMERCIAL

## 2023-09-14 VITALS
HEART RATE: 68 BPM | DIASTOLIC BLOOD PRESSURE: 84 MMHG | WEIGHT: 241.6 LBS | HEIGHT: 66 IN | OXYGEN SATURATION: 98 % | BODY MASS INDEX: 38.83 KG/M2 | TEMPERATURE: 97.7 F | SYSTOLIC BLOOD PRESSURE: 124 MMHG | RESPIRATION RATE: 18 BRPM

## 2023-09-14 DIAGNOSIS — R73.03 BORDERLINE DIABETES: ICD-10-CM

## 2023-09-14 DIAGNOSIS — E66.09 CLASS 2 OBESITY DUE TO EXCESS CALORIES WITHOUT SERIOUS COMORBIDITY WITH BODY MASS INDEX (BMI) OF 39.0 TO 39.9 IN ADULT: ICD-10-CM

## 2023-09-14 DIAGNOSIS — F33.1 MODERATE EPISODE OF RECURRENT MAJOR DEPRESSIVE DISORDER: ICD-10-CM

## 2023-09-14 DIAGNOSIS — F41.9 ANXIETY: Primary | ICD-10-CM

## 2023-09-14 RX ORDER — BUPROPION HYDROCHLORIDE 75 MG/1
75 TABLET ORAL 2 TIMES DAILY
Qty: 60 TABLET | Refills: 12 | Status: SHIPPED | OUTPATIENT
Start: 2023-09-14

## 2023-09-14 NOTE — ASSESSMENT & PLAN NOTE
Patient's (Body mass index is 39 kg/m².) indicates that they are obese (BMI >30) with health conditions that include hypertension and dyslipidemias . Weight is worsening. BMI  is above average; BMI management plan is completed. We discussed portion control and increasing exercise.

## 2023-10-20 ENCOUNTER — TELEMEDICINE (OUTPATIENT)
Dept: FAMILY MEDICINE CLINIC | Facility: TELEHEALTH | Age: 48
End: 2023-10-20
Payer: COMMERCIAL

## 2023-10-20 DIAGNOSIS — R60.0 LIP EDEMA: ICD-10-CM

## 2023-10-20 DIAGNOSIS — K12.0 APHTHOUS ULCER OF MOUTH: Primary | ICD-10-CM

## 2023-10-20 RX ORDER — PREDNISONE 20 MG/1
20 TABLET ORAL DAILY
Qty: 7 TABLET | Refills: 0 | Status: SHIPPED | OUTPATIENT
Start: 2023-10-20 | End: 2023-10-27

## 2023-10-20 RX ORDER — DOXYCYCLINE HYCLATE 100 MG/1
100 CAPSULE ORAL 2 TIMES DAILY
Qty: 14 CAPSULE | Refills: 0 | Status: SHIPPED | OUTPATIENT
Start: 2023-10-20 | End: 2023-10-27

## 2023-10-20 NOTE — PATIENT INSTRUCTIONS
Oral Ulcers  Oral ulcers are small sores inside the mouth or near the mouth. They may occur on or inside the lips, inside the cheeks, on the tongue, or anywhere else inside or near the mouth. They may be called canker sores or cold sores, which are two types of oral ulcers. Many oral ulcers are harmless and go away on their own. In some cases, oral ulcers may require medical care to determine the cause and proper treatment.  What are the causes?  Common causes of this condition include:  Infections caused by viruses, bacteria, or fungi.  Emotional stress.  Foods or chemicals that irritate the mouth.  Injury or physical irritation of the mouth.  Medicines.  Allergies.  Tobacco use.  Less common causes include:  Skin disease.  A type of herpes virus infection (herpes simplex or herpes zoster).  Oral cancer.  In some cases, the cause may not be known.  What increases the risk?  You are more likely to develop this condition if:  You wear dental braces, dentures, or retainers.  You do not take good care of your mouth and teeth (poor oral hygiene).  You have sensitive skin.  You have a condition that affects the entire body (systemic condition), such as an immune disorder.  What are the signs or symptoms?    The main symptom of this condition is having one or more oval-shaped or round ulcers that have red borders. Symptoms may vary depending on the cause. This includes:  Location of the ulcers. Ulcers may be found inside the mouth, on the gums, or on the insides of the lips or cheeks. They may also be found on the lips or on skin that is near the mouth, such as the cheeks or chin.  Pain. Ulcers can be painful and uncomfortable, or they can be painless.  Appearance of the ulcers. They may look like red blisters and be filled with fluid, or they may be white or yellow patches.  Frequency of outbreaks. Ulcers may go away permanently after one outbreak, or they may come back (recur) often or rarely.  How is this  diagnosed?  This condition is diagnosed with a physical exam. Your health care provider may ask you questions about your lifestyle and your medical history. You may have tests, including:  Blood tests.  Removal of a small number of cells from an ulcer to be examined under a microscope (biopsy).  How is this treated?  Treatment depends on the severity and cause of the condition. Oral ulcers often go away on their own in 1-2 weeks. Treatment may include medicines, such as:  Medicines to treat a viral infection (antivirals), a bacterial infection (antibiotics), or a fungal infection (antifungals).  Medicines to help control pain. This may include:  Over-the-counter pain medicines.  Gel, cream, or spray to numb the area (topical anesthetic) if you have severe pain.  Other medicines to coat or numb your mouth.  Follow these instructions at home:  Medicines  Take or use over-the-counter and prescription medicines only as told by your health care provider.  If you were prescribed an antibiotic medicine, take it as told by your health care provider. Do not stop taking the antibiotic even if you start to feel better.  Do not use products that contain benzocaine (including numbing gels) to treat teething or mouth pain in children who are younger than 2 years. These products may cause a rare but serious blood condition.  Eating and drinking  Eat a balanced diet. Do not eat:  Spicy foods.  Citrus, such as oranges.  Other foods and drinks that you think may cause or irritate your ulcers.  Drink enough fluid to keep your urine pale yellow.  Avoid drinking alcohol.  Lifestyle    Practice good oral hygiene:  Gently brush your teeth with a soft toothbrush two times a day.  Floss your teeth every day.  Get regular dental cleanings and checkups.  Do not use any products that contain nicotine or tobacco. These products include cigarettes, chewing tobacco, and vaping devices, such as e-cigarettes. If you need help quitting, ask your  health care provider.  Managing pain  If directed, put ice on your face in the affected area to help reduce pain. To do this:  Put ice in a plastic bag.  Place a towel between your skin and the bag.  Leave the ice on for 20 minutes, 2-3 times a day.  Remove the ice if your skin turns bright red. This is very important. If you cannot feel pain, heat, or cold, you have a greater risk of damage to the area.  Avoid physical or chemical irritants that may have caused the ulcers or made them worse, such as mouthwashes that contain alcohol (ethanol). If you wear dental braces, dentures, or retainers, work with your health care provider to make sure these devices are fitted correctly.  If you were prescribed a prescription mouthwash to help reduce pain in your mouth, use it as told by your health care provider.  General instructions  Rinse your mouth with a mixture of salt and water 3-4 times a day or as told by your health care provider. To make salt water, completely dissolve ½-1 tsp (3-6 g) of salt in 1 cup (237 mL) of warm water.  Keep all follow-up visits. This is important.  Contact a health care provider if:  You have:  Pain that gets worse or does not get better with medicine.  Four or more ulcers at one time.  A fever.  New ulcers that look or feel different from other ulcers you have.  Inflammation in one eye or both eyes.  Ulcers that do not go away after 10 days.  You develop new symptoms in your mouth, such as:  Bleeding or crusting around your lips or gums.  Tooth pain.  Difficulty swallowing.  You develop symptoms on your skin or genitals, such as:  A rash or blisters.  Burning or itching sensations.  Your ulcers begin or get worse after you start a new medicine.  Get help right away if:  You have difficulty breathing.  You have swelling in your face or neck.  You have excessive bleeding from your mouth.  You have severe pain.  Summary  Oral ulcers may occur anywhere inside or near the mouth.  They can be  caused by many things, such as infections, stress, injury or irritation, or tobacco use.  Oral ulcers can be painful or painless.  Treatment may include medicines to relieve pain or to treat an infection (if appropriate).  Most oral ulcers go away in 1-2 weeks.  This information is not intended to replace advice given to you by your health care provider. Make sure you discuss any questions you have with your health care provider.  Document Revised: 09/28/2022 Document Reviewed: 09/28/2022  Elsevier Patient Education © 2023 [employer]

## 2023-10-20 NOTE — PROGRESS NOTES
"Chief Complaint   Patient presents with    Facial Swelling     Lip swelling since yesterday       Video Visit Reason:   Free Text Description: Swollen and painful lip  Subjective   Alexus Serrano is a 47 y.o. female.     History of Present Illness  Left lower lip swollen with some increase in swelling since yesterday. She bit her lip and it has continued to swell some and tody is worse. She has felt a feeling of \"unwell\" today but has no fever, chills or respiratory symptoms. She has no allergies and has not started any new medications.  Facial Swelling  This is a new problem. The current episode started yesterday. The problem has been gradually worsening. Associated symptoms include fatigue. Pertinent negatives include no chills, congestion, coughing, fever, headaches, nausea or sore throat. Nothing aggravates the symptoms. She has tried nothing for the symptoms.       The following portions of the patient's history were reviewed and updated as appropriate: allergies, current medications, past medical history, and problem list.      Past Medical History:   Diagnosis Date    Pelvic pain 10/12/2021    Rash 04/05/2022     Social History     Socioeconomic History    Marital status: Single   Tobacco Use    Smoking status: Every Day     Types: Cigarettes     Start date: 8/2/2021    Tobacco comments:     vapes    Vaping Use    Vaping Use: Every day    Substances: Nicotine, Flavoring    Devices: Pre-filled or refillable cartridge   Substance and Sexual Activity    Alcohol use: Not Currently    Drug use: Not Currently     Types: Methamphetamines     Comment: clean for 27 months     Sexual activity: Not Currently     medication documentation: reviewed and updated with patient and   Current Outpatient Medications:     albuterol sulfate  (90 Base) MCG/ACT inhaler, Inhale 2 puffs Every 4 (Four) Hours As Needed for Wheezing., Disp: 8 g, Rfl: 0    atorvastatin (LIPITOR) 10 MG tablet, Take 1 tablet by mouth Daily., Disp: " 30 tablet, Rfl: 12    budesonide-formoterol (Symbicort) 80-4.5 MCG/ACT inhaler, Inhale 2 puffs 2 (Two) Times a Day., Disp: 6.9 g, Rfl: 12    buPROPion (WELLBUTRIN) 75 MG tablet, Take 1 tablet by mouth 2 (Two) Times a Day., Disp: 60 tablet, Rfl: 12    coenzyme Q10 100 MG capsule, Take 1 capsule by mouth Daily., Disp: , Rfl:     meloxicam (MOBIC) 15 MG tablet, Take 1 tablet by mouth Daily., Disp: 30 tablet, Rfl: 12    metFORMIN (GLUCOPHAGE) 500 MG tablet, Take 1 tablet by mouth Daily With Breakfast., Disp: 30 tablet, Rfl: 12    montelukast (SINGULAIR) 10 MG tablet, Take 1 tablet by mouth Every Night., Disp: 30 tablet, Rfl: 12    Omega-3 1000 MG capsule, Take  by mouth., Disp: , Rfl:     omeprazole (priLOSEC) 20 MG capsule, Take 1 capsule by mouth Daily., Disp: 30 capsule, Rfl: 4    doxycycline (VIBRAMYCIN) 100 MG capsule, Take 1 capsule by mouth 2 (Two) Times a Day for 7 days., Disp: 14 capsule, Rfl: 0    predniSONE (DELTASONE) 20 MG tablet, Take 1 tablet by mouth Daily for 7 days., Disp: 7 tablet, Rfl: 0  Review of Systems   Constitutional:  Positive for fatigue. Negative for activity change, appetite change, chills and fever.   HENT:  Positive for facial swelling (lower lip). Negative for congestion, postnasal drip, sinus pressure, sore throat, trouble swallowing and voice change.    Respiratory:  Negative for cough, shortness of breath and wheezing.    Gastrointestinal:  Negative for nausea.   Neurological:  Negative for headaches.       Objective   Physical Exam  Constitutional:       General: She is not in acute distress.     Appearance: She is not ill-appearing.   HENT:      Nose: No congestion.      Mouth/Throat:     Eyes:      Conjunctiva/sclera: Conjunctivae normal.   Neurological:      Mental Status: She is alert.   Psychiatric:         Mood and Affect: Mood normal.         Assessment & Plan   Diagnoses and all orders for this visit:    1. Aphthous ulcer of mouth (Primary)  -     predniSONE (DELTASONE) 20 MG  tablet; Take 1 tablet by mouth Daily for 7 days.  Dispense: 7 tablet; Refill: 0  -     doxycycline (VIBRAMYCIN) 100 MG capsule; Take 1 capsule by mouth 2 (Two) Times a Day for 7 days.  Dispense: 14 capsule; Refill: 0    2. Lip edema  -     predniSONE (DELTASONE) 20 MG tablet; Take 1 tablet by mouth Daily for 7 days.  Dispense: 7 tablet; Refill: 0  -     doxycycline (VIBRAMYCIN) 100 MG capsule; Take 1 capsule by mouth 2 (Two) Times a Day for 7 days.  Dispense: 14 capsule; Refill: 0                    Follow Up:  If your symptoms are not resolving by the completion of your treatment or are worsening, see your primary care provider for follow up. If you don't have a primary care provider, you may go to any Urgent Care for re-evaluation. If you develop any life threatening symptoms, go to the nearest Emergency Department immediately or call EMS.               The use of  Video Visit was utilized during this visit, using both [employer] and [employer] HR/Epic. The use of a video visit has been reviewed with the patient and verbal informed consent has been obtained. No technical difficulties occurred during the visit.    is located at [address] [facility] [place] [address]  Provider is located at [place], [place]

## 2023-11-30 DIAGNOSIS — K21.9 GASTROESOPHAGEAL REFLUX DISEASE, UNSPECIFIED WHETHER ESOPHAGITIS PRESENT: ICD-10-CM

## 2023-11-30 RX ORDER — OMEPRAZOLE 20 MG/1
20 CAPSULE, DELAYED RELEASE ORAL DAILY
Qty: 30 CAPSULE | Refills: 3 | Status: SHIPPED | OUTPATIENT
Start: 2023-11-30

## 2023-12-09 DIAGNOSIS — M79.7 FIBROMYALGIA: ICD-10-CM

## 2023-12-09 DIAGNOSIS — M51.36 DDD (DEGENERATIVE DISC DISEASE), LUMBAR: ICD-10-CM

## 2023-12-11 RX ORDER — MELOXICAM 15 MG/1
15 TABLET ORAL DAILY
Qty: 30 TABLET | Refills: 5 | Status: SHIPPED | OUTPATIENT
Start: 2023-12-11

## 2023-12-14 ENCOUNTER — OFFICE VISIT (OUTPATIENT)
Dept: FAMILY MEDICINE CLINIC | Facility: CLINIC | Age: 48
End: 2023-12-14
Payer: COMMERCIAL

## 2023-12-14 VITALS
HEART RATE: 104 BPM | SYSTOLIC BLOOD PRESSURE: 138 MMHG | TEMPERATURE: 97.8 F | BODY MASS INDEX: 40.79 KG/M2 | DIASTOLIC BLOOD PRESSURE: 74 MMHG | WEIGHT: 253.8 LBS | OXYGEN SATURATION: 98 % | RESPIRATION RATE: 18 BRPM | HEIGHT: 66 IN

## 2023-12-14 DIAGNOSIS — F33.1 MODERATE EPISODE OF RECURRENT MAJOR DEPRESSIVE DISORDER: ICD-10-CM

## 2023-12-14 DIAGNOSIS — F41.9 ANXIETY: ICD-10-CM

## 2023-12-14 DIAGNOSIS — E78.2 MIXED HYPERLIPIDEMIA: ICD-10-CM

## 2023-12-14 DIAGNOSIS — R73.03 BORDERLINE DIABETES: Primary | ICD-10-CM

## 2023-12-14 DIAGNOSIS — I10 ESSENTIAL HYPERTENSION: ICD-10-CM

## 2023-12-14 DIAGNOSIS — E66.01 CLASS 3 SEVERE OBESITY DUE TO EXCESS CALORIES WITHOUT SERIOUS COMORBIDITY WITH BODY MASS INDEX (BMI) OF 40.0 TO 44.9 IN ADULT: ICD-10-CM

## 2023-12-14 LAB
EXPIRATION DATE: NORMAL
HBA1C MFR BLD: 5.6 % (ref 4.5–5.7)
Lab: NORMAL

## 2023-12-14 RX ORDER — BUPROPION HYDROCHLORIDE 150 MG/1
150 TABLET ORAL DAILY
Qty: 30 TABLET | Refills: 12 | Status: SHIPPED | OUTPATIENT
Start: 2023-12-14

## 2023-12-14 RX ORDER — ESTRADIOL AND NORETHINDRONE ACETATE 1; .5 MG/1; MG/1
TABLET ORAL
COMMUNITY
Start: 2023-12-07

## 2023-12-14 RX ORDER — METFORMIN HYDROCHLORIDE 500 MG/1
500 TABLET, EXTENDED RELEASE ORAL
Qty: 30 TABLET | Refills: 12 | Status: SHIPPED | OUTPATIENT
Start: 2023-12-14

## 2023-12-14 NOTE — PROGRESS NOTES
Subjective   Alexus Serrano is a 48 y.o. female. Presents to [facility]    Chief Complaint   Patient presents with    Anxiety       Depression  Visit Type: follow-up  Patient presents with the following symptoms: insomnia, irritability, nervousness/anxiety, restlessness and shortness of breath.  Patient is not experiencing: decreased concentration, depressed mood, dizziness, dry mouth, excessive worry, fatigue, feelings of hopelessness, feelings of worthlessness, suicidal ideas, suicidal planning and thoughts of death.  Frequency of symptoms: most days   Severity: causing significant distress   Sleep per night: 6 hours  Sleep quality: non-restorative  Nighttime awakenings: several         I personally reviewed and updated the patient's allergies, medications, problem list, and past medical, surgical, social, and family history. I have reviewed and confirmed the accuracy of the History of Present Illness and Review of Symptoms as documented by the MA/LPN/RN. Mela Casey MD    Allergies:  No Known Allergies    Social History:  Social History     Socioeconomic History    Marital status: Single   Tobacco Use    Smoking status: Former     Types: Cigarettes     Start date: 2021     Passive exposure: Never    Tobacco comments:      Long time ago no vapes    Vaping Use    Vaping Use: Every day    Substances: Nicotine, Flavoring    Devices: Pre-filled or refillable cartridge   Substance and Sexual Activity    Alcohol use: Not Currently    Drug use: Not Currently     Types: Methamphetamines     Comment: clean for 27 months     Sexual activity: Not Currently       Family History:  Family History   Problem Relation Age of Onset    Cancer Mother 61        lung    Colon cancer Maternal Grandmother 62    Breast cancer Paternal Aunt 40         from it    Breast cancer Cousin 51         from it    Ovarian cancer Neg Hx        Past Medical History :  Patient Active Problem List   Diagnosis     Fibromyalgia    DDD (degenerative disc disease), lumbar    Premenstrual dysphoric syndrome    Essential hypertension    Anxiety    Moderate episode of recurrent major depressive disorder    GERD (gastroesophageal reflux disease)    Snoring    Right ovarian cyst    Class 3 severe obesity due to excess calories without serious comorbidity with body mass index (BMI) of 40.0 to 44.9 in adult    Stress incontinence    Daytime somnolence    Mixed hyperlipidemia    Borderline diabetes    Reactive airway disease with acute exacerbation    SOB (shortness of breath)    Hiatal hernia       Medication List:    Current Outpatient Medications:     albuterol sulfate  (90 Base) MCG/ACT inhaler, Inhale 2 puffs Every 4 (Four) Hours As Needed for Wheezing., Disp: 8 g, Rfl: 0    atorvastatin (LIPITOR) 10 MG tablet, Take 1 tablet by mouth Daily., Disp: 30 tablet, Rfl: 12    coenzyme Q10 100 MG capsule, Take 1 capsule by mouth Daily., Disp: , Rfl:     estradiol-norethindrone (ACTIVELLA) 1-0.5 MG per tablet, , Disp: , Rfl:     meloxicam (MOBIC) 15 MG tablet, Take 1 tablet by mouth once daily, Disp: 30 tablet, Rfl: 5    Omega-3 1000 MG capsule, Take  by mouth., Disp: , Rfl:     omeprazole (priLOSEC) 20 MG capsule, Take 1 capsule by mouth once daily, Disp: 30 capsule, Rfl: 3    amoxicillin-clavulanate (AUGMENTIN) 875-125 MG per tablet, Take 1 tablet by mouth 2 (Two) Times a Day for 10 days., Disp: 20 tablet, Rfl: 0    buPROPion XL (WELLBUTRIN XL) 150 MG 24 hr tablet, Take 1 tablet by mouth Daily., Disp: 30 tablet, Rfl: 12    metFORMIN ER (GLUCOPHAGE-XR) 500 MG 24 hr tablet, Take 1 tablet by mouth Daily With Breakfast., Disp: 30 tablet, Rfl: 12    Past Surgical History:  No past surgical history on file.      Physical Exam:      Vital Signs:    Vitals:    12/14/23 1640   BP: 138/74   Pulse: 104   Resp: 18   Temp: 97.8 °F (36.6 °C)   SpO2: 98%        /74   Pulse 104   Temp 97.8 °F (36.6 °C) (Temporal)   Resp 18   Ht 167.6 cm  "(66\")   Wt 115 kg (253 lb 12.8 oz)   LMP 12/14/2023   SpO2 98%   BMI 40.96 kg/m²     Wt Readings from Last 3 Encounters:   12/14/23 115 kg (253 lb 12.8 oz)   09/14/23 110 kg (241 lb 9.6 oz)   08/14/23 108 kg (237 lb 3.2 oz)       Result Review :   The following data was reviewed by: Mela Casey MD on 12/14/2023:  A1C Last 3 Results          5/23/2023    08:14 8/24/2023    08:45 12/14/2023    17:01   HGBA1C Last 3 Results   Hemoglobin A1C 6.0  5.7  5.6               Physical Exam  Vitals reviewed.   Constitutional:       Appearance: Normal appearance. She is well-developed.   HENT:      Head: Normocephalic and atraumatic.   Eyes:      General:         Right eye: No discharge.         Left eye: No discharge.   Cardiovascular:      Rate and Rhythm: Normal rate and regular rhythm.      Heart sounds: Normal heart sounds. No murmur heard.     No friction rub. No gallop.   Pulmonary:      Effort: Pulmonary effort is normal. No respiratory distress.      Breath sounds: Normal breath sounds. No wheezing or rales.   Skin:     General: Skin is warm and dry.      Findings: No rash.   Neurological:      Mental Status: She is alert and oriented to person, place, and time.      Coordination: Coordination normal.      Gait: Gait normal.   Psychiatric:         Behavior: Behavior is cooperative.       Assessment and Plan:  Problems Addressed this Visit          Cardiac and Vasculature    Essential hypertension     Hypertension is worsening.  Continue current treatment regimen.  Dietary sodium restriction.  Weight loss.  Continue current medications.  Blood pressure will be reassessed at the next regular appointment.         Relevant Orders    CBC & Differential    TSH    Mixed hyperlipidemia    Relevant Orders    Comprehensive Metabolic Panel    Lipid Panel With / Chol / HDL Ratio       Endocrine and Metabolic    Class 3 severe obesity due to excess calories without serious comorbidity with body mass index (BMI) of 40.0 to " 44.9 in adult    Borderline diabetes - Primary     Much improved         Relevant Medications    metFORMIN ER (GLUCOPHAGE-XR) 500 MG 24 hr tablet    Other Relevant Orders    POC Glycosylated Hemoglobin (Hb A1C) (Completed)       Mental Health    Anxiety    Relevant Medications    buPROPion XL (WELLBUTRIN XL) 150 MG 24 hr tablet    Moderate episode of recurrent major depressive disorder     Patient's depression is recurrent and is moderate without psychosis. Their depression is currently active and the condition is worsening. This will be reassessed in 4 weeks. F/U as described:patient was prescribed an antidepressant medicine.         Relevant Medications    buPROPion XL (WELLBUTRIN XL) 150 MG 24 hr tablet     Diagnoses         Codes Comments    Borderline diabetes    -  Primary ICD-10-CM: R73.03  ICD-9-CM: 790.29     Class 3 severe obesity due to excess calories without serious comorbidity with body mass index (BMI) of 40.0 to 44.9 in adult     ICD-10-CM: E66.01, Z68.41  ICD-9-CM: 278.01, V85.41     Anxiety     ICD-10-CM: F41.9  ICD-9-CM: 300.00     Moderate episode of recurrent major depressive disorder     ICD-10-CM: F33.1  ICD-9-CM: 296.32     Essential hypertension     ICD-10-CM: I10  ICD-9-CM: 401.9     Mixed hyperlipidemia     ICD-10-CM: E78.2  ICD-9-CM: 272.2                     An After Visit Summary and PPPS were given to the patient.

## 2023-12-26 ENCOUNTER — TELEMEDICINE (OUTPATIENT)
Dept: FAMILY MEDICINE CLINIC | Facility: TELEHEALTH | Age: 48
End: 2023-12-26
Payer: COMMERCIAL

## 2023-12-26 DIAGNOSIS — J01.40 ACUTE PANSINUSITIS, RECURRENCE NOT SPECIFIED: Primary | ICD-10-CM

## 2023-12-26 RX ORDER — AMOXICILLIN AND CLAVULANATE POTASSIUM 875; 125 MG/1; MG/1
1 TABLET, FILM COATED ORAL 2 TIMES DAILY
Qty: 20 TABLET | Refills: 0 | Status: SHIPPED | OUTPATIENT
Start: 2023-12-26 | End: 2024-01-05

## 2023-12-26 NOTE — PATIENT INSTRUCTIONS
Drink plenty of water  Over the counter pain relievers okay   If symptoms do not improve in 3-5 days follow up with your primary care provider or urgent care  If symptoms worsen follow up with urgent care or the emergency room      Sinus Infection, Adult  A sinus infection is soreness and swelling (inflammation) of your sinuses. Sinuses are hollow spaces in the bones around your face. They are located:  Around your eyes.  In the middle of your forehead.  Behind your nose.  In your cheekbones.  Your sinuses and nasal passages are lined with a fluid called mucus. Mucus drains out of your sinuses. Swelling can trap mucus in your sinuses. This lets germs (bacteria, virus, or fungus) grow, which leads to infection. Most of the time, this condition is caused by a virus.  What are the causes?  Allergies.  Asthma.  Germs.  Things that block your nose or sinuses.  Growths in the nose (nasal polyps).  Chemicals or irritants in the air.  A fungus. This is rare.  What increases the risk?  Having a weak body defense system (immune system).  Doing a lot of swimming or diving.  Using nasal sprays too much.  Smoking.  What are the signs or symptoms?  The main symptoms of this condition are pain and a feeling of pressure around the sinuses. Other symptoms include:  Stuffy nose (congestion). This may make it hard to breathe through your nose.  Runny nose (drainage).  Soreness, swelling, and warmth in the sinuses.  A cough that may get worse at night.  Being unable to smell and taste.  Mucus that collects in the throat or the back of the nose (postnasal drip). This may cause a sore throat or bad breath.  Being very tired (fatigued).  A fever.  How is this diagnosed?  Your symptoms.  Your medical history.  A physical exam.  Tests to find out if your condition is short-term (acute) or long-term (chronic). Your doctor may:  Check your nose for growths (polyps).  Check your sinuses using a tool that has a light on one end  (endoscope).  Check for allergies or germs.  Do imaging tests, such as an MRI or CT scan.  How is this treated?  Treatment for this condition depends on the cause and whether it is short-term or long-term.  If caused by a virus, your symptoms should go away on their own within 10 days. You may be given medicines to relieve symptoms. They include:  Medicines that shrink swollen tissue in the nose.  A spray that treats swelling of the nostrils.  Rinses that help get rid of thick mucus in your nose (nasal saline washes).  Medicines that treat allergies (antihistamines).  Over-the-counter pain relievers.  If caused by bacteria, your doctor may wait to see if you will get better without treatment. You may be given antibiotic medicine if you have:  A very bad infection.  A weak body defense system.  If caused by growths in the nose, surgery may be needed.  Follow these instructions at home:  Medicines  Take, use, or apply over-the-counter and prescription medicines only as told by your doctor. These may include nasal sprays.  If you were prescribed an antibiotic medicine, take it as told by your doctor. Do not stop taking it even if you start to feel better.  Hydrate and humidify    Drink enough water to keep your pee (urine) pale yellow.  Use a cool mist humidifier to keep the humidity level in your home above 50%.  Breathe in steam for 10-15 minutes, 3-4 times a day, or as told by your doctor. You can do this in the bathroom while a hot shower is running.  Try not to spend time in cool or dry air.  Rest  Rest as much as you can.  Sleep with your head raised (elevated).  Make sure you get enough sleep each night.  General instructions    Put a warm, moist washcloth on your face 3-4 times a day, or as often as told by your doctor.  Use nasal saline washes as often as told by your doctor.  Wash your hands often with soap and water. If you cannot use soap and water, use hand .  Do not smoke. Avoid being around  people who are smoking (secondhand smoke).  Keep all follow-up visits.  Contact a doctor if:  You have a fever.  Your symptoms get worse.  Your symptoms do not get better within 10 days.  Get help right away if:  You have a very bad headache.  You cannot stop vomiting.  You have very bad pain or swelling around your face or eyes.  You have trouble seeing.  You feel confused.  Your neck is stiff.  You have trouble breathing.  These symptoms may be an emergency. Get help right away. Call 911.  Do not wait to see if the symptoms will go away.  Do not drive yourself to the hospital.  Summary  A sinus infection is swelling of your sinuses. Sinuses are hollow spaces in the bones around your face.  This condition is caused by tissues in your nose that become inflamed or swollen. This traps germs. These can lead to infection.  If you were prescribed an antibiotic medicine, take it as told by your doctor. Do not stop taking it even if you start to feel better.  Keep all follow-up visits.  This information is not intended to replace advice given to you by your health care provider. Make sure you discuss any questions you have with your health care provider.  Document Revised: 11/22/2022 Document Reviewed: 11/22/2022  Elsevier Patient Education © 2023 [employer]

## 2023-12-26 NOTE — PROGRESS NOTES
CHIEF COMPLAINT  Chief Complaint   Patient presents with    Sinusitis         HPI  Alexus Serrano is a 48 y.o. female  presents with complaint of sinus infection. She has had these many times. She has been using over the counter sinus/cold medications which initially helped, but is not helping now.   Negative COVID-19 test at work.     Review of Systems   Constitutional:  Positive for fatigue. Negative for chills, diaphoresis and fever.   HENT:  Positive for congestion, postnasal drip, sinus pressure and sinus pain. Negative for rhinorrhea, sneezing and sore throat.    Respiratory:  Positive for cough. Negative for chest tightness, shortness of breath and wheezing.    Gastrointestinal:  Negative for diarrhea, nausea and vomiting.   Musculoskeletal:  Negative for myalgias.   Neurological:  Negative for headaches.       Past Medical History:   Diagnosis Date    Pelvic pain 10/12/2021    Rash 2022       Family History   Problem Relation Age of Onset    Cancer Mother 61        lung    Colon cancer Maternal Grandmother 62    Breast cancer Paternal Aunt 40         from it    Breast cancer Cousin 51         from it    Ovarian cancer Neg Hx        Social History     Socioeconomic History    Marital status: Single   Tobacco Use    Smoking status: Former     Types: Cigarettes     Start date: 2021     Passive exposure: Never    Tobacco comments:      Long time ago no vapes    Vaping Use    Vaping Use: Every day    Substances: Nicotine, Flavoring    Devices: Pre-filled or refillable cartridge   Substance and Sexual Activity    Alcohol use: Not Currently    Drug use: Not Currently     Types: Methamphetamines     Comment: clean for 27 months     Sexual activity: Not Currently       Alexus Serrano  reports that she has quit smoking. Her smoking use included cigarettes. She started smoking about 2 years ago. She has never been exposed to tobacco smoke. She does not have any smokeless tobacco history on  file..      [facility] 12/15/2023     PHYSICAL EXAM  Physical Exam   Constitutional: She is oriented to person, place, and time. She appears well-developed and well-nourished. She does not have a sickly appearance. She does not appear ill. No distress.   HENT:   Head: Normocephalic and atraumatic.   Nose: Right sinus exhibits maxillary sinus tenderness and frontal sinus tenderness. Left sinus exhibits frontal sinus tenderness.   Eyes: EOM are normal.   Neck: Neck normal appearance.  Pulmonary/Chest: Effort normal.  No respiratory distress.  Neurological: She is alert and oriented to person, place, and time.   Skin: Skin is dry.   Psychiatric: She has a normal mood and affect.         Diagnoses and all orders for this visit:    1. Acute pansinusitis, recurrence not specified (Primary)    Other orders  -     amoxicillin-clavulanate (AUGMENTIN) 875-125 MG per tablet; Take 1 tablet by mouth 2 (Two) Times a Day for 10 days.  Dispense: 20 tablet; Refill: 0        The use of a video visit has been reviewed with the patient and verbal informed consent has been obtained. Myself and Alexus Serrano participated in this visit. The patient is located in [address] [place] [address] [place] [facility]. I am located in [place], [place]. [employer] and [employer] were utilized.       Note Disclaimer: At [facility], we believe that sharing information builds trust and better   relationships. You are receiving this note because you recently visited [facility]. It is possible you   will see health information before a provider has talked with you about it. This kind of information can   be easy to misunderstand. To help you fully understand what it means for your health, we urge you to   discuss this note with your provider.    Sailaja Carmona, OBDULIA  12/26/2023  12:11 EST

## 2023-12-26 NOTE — ASSESSMENT & PLAN NOTE
Hypertension is worsening.  Continue current treatment regimen.  Dietary sodium restriction.  Weight loss.  Continue current medications.  Blood pressure will be reassessed at the next regular appointment.

## 2023-12-28 ENCOUNTER — HOSPITAL ENCOUNTER (OUTPATIENT)
Dept: MAMMOGRAPHY | Facility: HOSPITAL | Age: 48
Discharge: HOME | End: 2023-12-28
Admitting: FAMILY MEDICINE
Payer: COMMERCIAL

## 2023-12-28 DIAGNOSIS — Z12.31 ENCOUNTER FOR SCREENING MAMMOGRAM FOR MALIGNANT NEOPLASM OF BREAST: ICD-10-CM

## 2023-12-28 PROCEDURE — 77067 SCR MAMMO BI INCL CAD: CPT

## 2023-12-28 PROCEDURE — 77063 BREAST TOMOSYNTHESIS BI: CPT

## 2024-01-05 LAB
ALBUMIN SERPL-MCNC: 3.9 G/DL (ref 3.9–4.9)
ALBUMIN/GLOB SERPL: 1.3 {RATIO} (ref 1.2–2.2)
ALP SERPL-CCNC: 102 IU/L (ref 44–121)
ALT SERPL-CCNC: 18 IU/L (ref 0–32)
AST SERPL-CCNC: 27 IU/L (ref 0–40)
BASOPHILS # BLD AUTO: 0.1 X10E3/UL (ref 0–0.2)
BASOPHILS NFR BLD AUTO: 1 %
BILIRUB SERPL-MCNC: 0.2 MG/DL (ref 0–1.2)
BUN SERPL-MCNC: 13 MG/DL (ref 6–24)
BUN/CREAT SERPL: 18 (ref 9–23)
CALCIUM SERPL-MCNC: 9.7 MG/DL (ref 8.7–10.2)
CHLORIDE SERPL-SCNC: 101 MMOL/L (ref 96–106)
CHOLEST SERPL-MCNC: 179 MG/DL (ref 100–199)
CHOLEST/HDLC SERPL: 3.3 RATIO (ref 0–4.4)
CO2 SERPL-SCNC: 21 MMOL/L (ref 20–29)
CREAT SERPL-MCNC: 0.74 MG/DL (ref 0.57–1)
EGFRCR SERPLBLD CKD-EPI 2021: 100 ML/MIN/1.73
EOSINOPHIL # BLD AUTO: 0.3 X10E3/UL (ref 0–0.4)
EOSINOPHIL NFR BLD AUTO: 3 %
ERYTHROCYTE [DISTWIDTH] IN BLOOD BY AUTOMATED COUNT: 14.1 % (ref 11.7–15.4)
GLOBULIN SER CALC-MCNC: 3 G/DL (ref 1.5–4.5)
GLUCOSE SERPL-MCNC: 92 MG/DL (ref 70–99)
HCT VFR BLD AUTO: 37.9 % (ref 34–46.6)
HDLC SERPL-MCNC: 55 MG/DL
HGB BLD-MCNC: 12.2 G/DL (ref 11.1–15.9)
IMM GRANULOCYTES # BLD AUTO: 0 X10E3/UL (ref 0–0.1)
IMM GRANULOCYTES NFR BLD AUTO: 0 %
LDLC SERPL CALC-MCNC: 92 MG/DL (ref 0–99)
LYMPHOCYTES # BLD AUTO: 3.2 X10E3/UL (ref 0.7–3.1)
LYMPHOCYTES NFR BLD AUTO: 30 %
MCH RBC QN AUTO: 25.7 PG (ref 26.6–33)
MCHC RBC AUTO-ENTMCNC: 32.2 G/DL (ref 31.5–35.7)
MCV RBC AUTO: 80 FL (ref 79–97)
MONOCYTES # BLD AUTO: 0.6 X10E3/UL (ref 0.1–0.9)
MONOCYTES NFR BLD AUTO: 6 %
NEUTROPHILS # BLD AUTO: 6.6 X10E3/UL (ref 1.4–7)
NEUTROPHILS NFR BLD AUTO: 60 %
PLATELET # BLD AUTO: 410 X10E3/UL (ref 150–450)
POTASSIUM SERPL-SCNC: 5.1 MMOL/L (ref 3.5–5.2)
PROT SERPL-MCNC: 6.9 G/DL (ref 6–8.5)
RBC # BLD AUTO: 4.75 X10E6/UL (ref 3.77–5.28)
SODIUM SERPL-SCNC: 137 MMOL/L (ref 134–144)
TRIGL SERPL-MCNC: 188 MG/DL (ref 0–149)
TSH SERPL DL<=0.005 MIU/L-ACNC: 1.14 UIU/ML (ref 0.45–4.5)
VLDLC SERPL CALC-MCNC: 32 MG/DL (ref 5–40)
WBC # BLD AUTO: 10.8 X10E3/UL (ref 3.4–10.8)

## 2024-01-14 ENCOUNTER — TELEMEDICINE (OUTPATIENT)
Dept: FAMILY MEDICINE CLINIC | Facility: TELEHEALTH | Age: 49
End: 2024-01-14
Payer: COMMERCIAL

## 2024-01-14 DIAGNOSIS — J32.9 BACTERIAL SINUSITIS: Primary | ICD-10-CM

## 2024-01-14 DIAGNOSIS — B96.89 BACTERIAL SINUSITIS: Primary | ICD-10-CM

## 2024-01-14 RX ORDER — FLUTICASONE PROPIONATE 50 MCG
2 SPRAY, SUSPENSION (ML) NASAL DAILY
Qty: 16 G | Refills: 0 | Status: SHIPPED | OUTPATIENT
Start: 2024-01-14 | End: 2024-02-13

## 2024-01-14 RX ORDER — METHYLPREDNISOLONE 4 MG/1
TABLET ORAL
Qty: 1 EACH | Refills: 0 | Status: SHIPPED | OUTPATIENT
Start: 2024-01-14

## 2024-01-14 RX ORDER — DOXYCYCLINE HYCLATE 100 MG/1
100 CAPSULE ORAL 2 TIMES DAILY
Qty: 14 CAPSULE | Refills: 0 | Status: SHIPPED | OUTPATIENT
Start: 2024-01-14 | End: 2024-01-21

## 2024-01-14 NOTE — PROGRESS NOTES
Chief Complaint   Patient presents with    Sinusitis       Video Visit Reason:   Free Text Description: Sinus Congestion and Pressure  Subjective   Alexus Serrano is a 48 y.o. female.     History of Present Illness  Sinus, nasal and facial pressure for several days. She had sinus infection treated with Augmentin late last month that seemed to improve but symptoms have returned with increasing severity. She has nasal congestion, nasal drainage with yellow to green color and can't breath through her nose. She has tried Mucinex cold medicine for several days without any relief.   Sinusitis  This is a new problem. The problem has been gradually worsening since onset. There has been no fever. The pain is moderate. Associated symptoms include congestion, coughing, headaches, a hoarse voice, sinus pressure and a sore throat. Pertinent negatives include no chills or shortness of breath. Past treatments include oral decongestants and acetaminophen. The treatment provided no relief.       The following portions of the patient's history were reviewed and updated as appropriate: allergies, current medications, past medical history, and problem list.      Past Medical History:   Diagnosis Date    Pelvic pain 10/12/2021    Rash 04/05/2022     Social History     Socioeconomic History    Marital status: Single   Tobacco Use    Smoking status: Former     Types: Cigarettes     Start date: 8/2/2021     Passive exposure: Never    Tobacco comments:      Long time ago no vapes    Vaping Use    Vaping Use: Every day    Substances: Nicotine, Flavoring    Devices: Pre-filled or refillable cartridge   Substance and Sexual Activity    Alcohol use: Not Currently    Drug use: Not Currently     Types: Methamphetamines     Comment: clean for 27 months     Sexual activity: Not Currently     medication documentation: reviewed and updated with patient and   Current Outpatient Medications:     albuterol sulfate  (90 Base) MCG/ACT inhaler,  Inhale 2 puffs Every 4 (Four) Hours As Needed for Wheezing., Disp: 8 g, Rfl: 0    atorvastatin (LIPITOR) 10 MG tablet, Take 1 tablet by mouth Daily., Disp: 30 tablet, Rfl: 12    buPROPion XL (WELLBUTRIN XL) 150 MG 24 hr tablet, Take 1 tablet by mouth Daily., Disp: 30 tablet, Rfl: 12    coenzyme Q10 100 MG capsule, Take 1 capsule by mouth Daily., Disp: , Rfl:     estradiol-norethindrone (ACTIVELLA) 1-0.5 MG per tablet, , Disp: , Rfl:     meloxicam (MOBIC) 15 MG tablet, Take 1 tablet by mouth once daily, Disp: 30 tablet, Rfl: 5    metFORMIN ER (GLUCOPHAGE-XR) 500 MG 24 hr tablet, Take 1 tablet by mouth Daily With Breakfast., Disp: 30 tablet, Rfl: 12    Omega-3 1000 MG capsule, Take  by mouth., Disp: , Rfl:     omeprazole (priLOSEC) 20 MG capsule, Take 1 capsule by mouth once daily, Disp: 30 capsule, Rfl: 3    doxycycline (VIBRAMYCIN) 100 MG capsule, Take 1 capsule by mouth 2 (Two) Times a Day for 7 days., Disp: 14 capsule, Rfl: 0    fluticasone (FLONASE) 50 MCG/ACT nasal spray, 2 sprays into the nostril(s) as directed by provider Daily for 30 days. Administer 2 sprays in each nostril for each dose., Disp: 16 g, Rfl: 0    methylPREDNISolone (MEDROL) 4 MG dose pack, Take as directed on package instructions., Disp: 1 each, Rfl: 0  Review of Systems   Constitutional:  Positive for fatigue. Negative for chills and fever.   HENT:  Positive for congestion, hoarse voice, postnasal drip, sinus pressure, sinus pain, sore throat and voice change.    Respiratory:  Positive for cough. Negative for shortness of breath and wheezing.    Neurological:  Positive for headaches.       Objective   Physical Exam  Constitutional:       Appearance: Normal appearance. She is well-developed.   HENT:      Nose: Congestion present.      Right Sinus: Maxillary sinus tenderness present.      Left Sinus: Maxillary sinus tenderness present.   Eyes:      Conjunctiva/sclera: Conjunctivae normal.   Pulmonary:      Effort: Pulmonary effort is normal.    Lymphadenopathy:      Head:      Right side of head: No tonsillar, preauricular or posterior auricular adenopathy.      Left side of head: No tonsillar, preauricular or posterior auricular adenopathy.      Cervical: No cervical adenopathy (patient guided exam).   Psychiatric:         Speech: Speech normal.         Assessment & Plan   Diagnoses and all orders for this visit:    1. Bacterial sinusitis (Primary)  -     doxycycline (VIBRAMYCIN) 100 MG capsule; Take 1 capsule by mouth 2 (Two) Times a Day for 7 days.  Dispense: 14 capsule; Refill: 0  -     methylPREDNISolone (MEDROL) 4 MG dose pack; Take as directed on package instructions.  Dispense: 1 each; Refill: 0  -     fluticasone (FLONASE) 50 MCG/ACT nasal spray; 2 sprays into the nostril(s) as directed by provider Daily for 30 days. Administer 2 sprays in each nostril for each dose.  Dispense: 16 g; Refill: 0                    Follow Up:  If your symptoms are not resolving by the completion of your treatment or are worsening, see your primary care provider for follow up. If you don't have a primary care provider, you may go to any Urgent Care for re-evaluation. If you develop any life threatening symptoms, go to the nearest Emergency Department immediately or call EMS.               The use of  Video Visit was utilized during this visit, using both [employer] and [employer]. The use of a video visit has been reviewed with the patient and verbal informed consent has been obtained. No technical difficulties occurred during the visit.    is located at [address] [facility] [place] [address]  Provider is located at [place], [place]

## 2024-01-22 NOTE — PROGRESS NOTES
"  Chief Complaint   Patient presents with    Annual Exam    Hypertension    Hyperlipidemia    Diabetes         Subjective   Alexus Serrano is a 48 y.o. female here for a Annual Visit.     Last Physical Exam: 11/17/2022 Previous physical was performed by  Mela Casey MD  General Health:fair  Contraceptive History:tubal ligation  Nutrition:in general, an \"unhealthy\" diet  Exercise Level:not at all  Sleep:poorly  Hours of Sleep:6  Libido:fair  Self Skin Exam: monthly  Monthly Breast Self Exam:Performs monthly self breast exam    Pap Smear:  Last Completed Pap Smear            PAP SMEAR (Every 3 Years) Next due on 10/18/2026      10/18/2023  SCANNED - PAP SMEAR    10/18/2023  SCANNED - PAP SMEAR                 Findings on last pap: approximate date 10/18/2023 and was normal} Dr Tolentino    Mammogram:   Last Completed Mammogram       This patient has no relevant Health Maintenance data.         was done on approximately 12/28/2023 and the result was: Birads I (Normal).    Bone Dexa scan: None    Colonoscopy:   Last Completed Colonoscopy       This patient has no relevant Health Maintenance data.          No prior          I personally reviewed and updated the patient's allergies, medications, problem list, and past medical, surgical, social, and family history.     Allergies:  No Known Allergies    Social History:  Social History     Socioeconomic History    Marital status: Single   Tobacco Use    Smoking status: Former     Packs/day: 1.00     Years: 3.00     Additional pack years: 0.00     Total pack years: 3.00     Types: Cigarettes     Start date: 8/2/2021     Passive exposure: Never    Tobacco comments:      Long time ago no vapes    Vaping Use    Vaping Use: Every day    Substances: Nicotine, Flavoring    Devices: Pre-filled or refillable cartridge   Substance and Sexual Activity    Alcohol use: Not Currently    Drug use: Not Currently     Types: Methamphetamines     Comment: clean for 27 months     Sexual " activity: Not Currently       Family History:  Family History   Problem Relation Age of Onset    Cancer Mother 61        lung    Colon cancer Maternal Grandmother 62    Breast cancer Paternal Aunt 40         from it    Breast cancer Cousin 51         from it    Ovarian cancer Neg Hx        Past Medical History :  Active Ambulatory Problems     Diagnosis Date Noted    Fibromyalgia 2012    DDD (degenerative disc disease), lumbar 2012    Premenstrual dysphoric syndrome 2012    Essential hypertension 2021    Anxiety 2021    Moderate episode of recurrent major depressive disorder 2021    GERD (gastroesophageal reflux disease) 2021    Snoring 2021    Right ovarian cyst 2021    Class 3 severe obesity due to excess calories without serious comorbidity with body mass index (BMI) of 40.0 to 44.9 in adult 2021    Stress incontinence 2021    Daytime somnolence 2022    Mixed hyperlipidemia 02/10/2023    Borderline diabetes 02/10/2023    Reactive airway disease with acute exacerbation 06/15/2023    Hiatal hernia 2023     Resolved Ambulatory Problems     Diagnosis Date Noted    Acute non-recurrent maxillary sinusitis 2021    Pelvic pain 10/12/2021    Rash 2022    Hyperglycemia 02/10/2023    Viral upper respiratory tract infection 06/15/2023    Acute bacterial bronchitis 06/15/2023    SOB (shortness of breath) 2023     No Additional Past Medical History       Medication List:    Current Outpatient Medications:     albuterol sulfate  (90 Base) MCG/ACT inhaler, Inhale 2 puffs Every 4 (Four) Hours As Needed for Wheezing., Disp: 8 g, Rfl: 0    atorvastatin (LIPITOR) 10 MG tablet, Take 1 tablet by mouth Daily., Disp: 30 tablet, Rfl: 12    buPROPion XL (WELLBUTRIN XL) 300 MG 24 hr tablet, Take 1 tablet by mouth Daily., Disp: 30 tablet, Rfl: 12    coenzyme Q10 100 MG capsule, Take 1 capsule by mouth Daily., Disp: , Rfl:      "fluticasone (FLONASE) 50 MCG/ACT nasal spray, 2 sprays into the nostril(s) as directed by provider Daily for 30 days. Administer 2 sprays in each nostril for each dose., Disp: 16 g, Rfl: 0    meloxicam (MOBIC) 15 MG tablet, Take 1 tablet by mouth once daily, Disp: 30 tablet, Rfl: 5    metFORMIN ER (GLUCOPHAGE-XR) 500 MG 24 hr tablet, Take 1 tablet by mouth Daily With Breakfast., Disp: 30 tablet, Rfl: 12    Omega-3 1000 MG capsule, Take  by mouth., Disp: , Rfl:     omeprazole (priLOSEC) 20 MG capsule, Take 1 capsule by mouth once daily, Disp: 30 capsule, Rfl: 3    Past Surgical History:  No past surgical history on file.    Depression Screen:       1/25/2024     4:23 PM   PHQ-2/PHQ-9 Depression Screening   Little Interest or Pleasure in Doing Things 0-->not at all   Feeling Down, Depressed or Hopeless 0-->not at all   PHQ-9: Brief Depression Severity Measure Score 0       Fall Risk Screen:  [facility].        Physical Exam:  Vital Signs:  Visit Vitals  /84 (BP Location: Right arm, Patient Position: Sitting, Cuff Size: Adult)   Pulse 107   Temp 97.3 °F (36.3 °C) (Temporal)   Resp 18   Ht 167.6 cm (66\")   Wt 118 kg (259 lb 12.8 oz)   LMP 12/15/2023   SpO2 99% Comment: room air   BMI 41.93 kg/m²       Body mass index is 41.93 kg/m².      Result Review :   The following data was reviewed by: Mela Casey MD on 01/25/2024:  A1C Last 3 Results          5/23/2023    08:14 8/24/2023    08:45 12/14/2023    17:01   HGBA1C Last 3 Results   Hemoglobin A1C 6.0  5.7  5.6            Latest Reference Range & Units 01/04/24 09:21 01/25/24 16:32   Sodium 134 - 144 mmol/L 137    Potassium 3.5 - 5.2 mmol/L 5.1    Chloride 96 - 106 mmol/L 101    CO2 20 - 29 mmol/L 21    BUN 6 - 24 mg/dL 13    Creatinine 0.57 - 1.00 mg/dL 0.74    BUN/Creatinine Ratio 9 - 23  18    EGFR Result >59 mL/min/1.73 100    Glucose 70 - 99 mg/dL 92    Calcium 8.7 - 10.2 mg/dL 9.7    Alkaline Phosphatase 44 - 121 " IU/L 102    Total Protein 6.0 - 8.5 g/dL 6.9    Albumin 3.9 - 4.9 g/dL 3.9    A/G Ratio 1.2 - 2.2  1.3    AST (SGOT) 0 - 40 IU/L 27    ALT (SGPT) 0 - 32 IU/L 18    Total Bilirubin 0.0 - 1.2 mg/dL 0.2    TSH Baseline 0.450 - 4.500 uIU/mL 1.140    Total Cholesterol 100 - 199 mg/dL 179    HDL Cholesterol >39 mg/dL 55    LDL Cholesterol  0 - 99 mg/dL 92    Triglycerides 0 - 149 mg/dL 188 (H)    Chol/HDL Ratio 0.0 - 4.4 ratio 3.3    VLDL Cholesterol Carlos 5 - 40 mg/dL 32    Globulin 1.5 - 4.5 g/dL 3.0    WBC 3.4 - 10.8 x10E3/uL 10.8    RBC 3.77 - 5.28 x10E6/uL 4.75    Hemoglobin 11.1 - 15.9 g/dL 12.2    Hematocrit 34.0 - 46.6 % 37.9    Platelets 150 - 450 x10E3/uL 410    RDW 11.7 - 15.4 % 14.1    MCV 79 - 97 fL 80    MCH 26.6 - 33.0 pg 25.7 (L)    MCHC 31.5 - 35.7 g/dL 32.2    Neutrophil Rel % Not Estab. % 60    Lymphocyte Rel % Not Estab. % 30    Monocyte Rel % Not Estab. % 6    Eosinophil Rel % Not Estab. % 3    Basophil Rel % Not Estab. % 1    Immature Granulocyte Rel % Not Estab. % 0    Neutrophils Absolute 1.4 - 7.0 x10E3/uL 6.6    Lymphocytes Absolute 0.7 - 3.1 x10E3/uL 3.2 (H)    Monocytes Absolute 0.1 - 0.9 x10E3/uL 0.6    Eosinophils Absolute 0.0 - 0.4 x10E3/uL 0.3    Basophils Absolute 0.0 - 0.2 x10E3/uL 0.1    Immature Grans, Absolute 0.0 - 0.1 x10E3/uL 0.0    Color, UA Yellow, Straw, Dark Yellow, Debra   Yellow   Appearance, UA Clear   Clear   Specific Gravity, UA 1.005 - 1.030   1.020   pH, UA 5.0 - 8.0   5.0   Glucose Negative mg/dL  Negative   Ketones, UA Negative   Negative   Blood, UA Negative   Negative   Nitrite, UA Negative   Negative   Leukocytes, UA Negative   Negative   Protein, UA Negative mg/dL  Trace !   Bilirubin, UA Negative   Negative   Urobilinogen, UA Normal, 0.2 E.U./dL   Normal   (H): Data is abnormally high  (L): Data is abnormally low  !: Data is abnormal          Assessment and Plan:  Problem List Items Addressed This Visit          Cardiac and Vasculature    Essential hypertension     Current Assessment & Plan     Hypertension is unchanged.  Continue current treatment regimen.  Dietary sodium restriction.  Weight loss.  Blood pressure will be reassessed in 3 months.         Mixed hyperlipidemia       Endocrine and Metabolic    Class 3 severe obesity due to excess calories without serious comorbidity with body mass index (BMI) of 40.0 to 44.9 in adult    Borderline diabetes    Current Assessment & Plan     Weight has gone up  She is doing well with metformin            Mental Health    Anxiety    Relevant Medications    buPROPion XL (WELLBUTRIN XL) 300 MG 24 hr tablet    Moderate episode of recurrent major depressive disorder    Current Assessment & Plan     Patient's depression is recurrent and is moderate without psychosis. Their depression is currently active and the condition is worsening. This will be reassessed in 4 weeks. F/U as described: increase wellbutrin .    Diagnosis, treatment and and course discussed. Potential side effects discussed. Return if there is worsening or persistence of symptoms.            Relevant Medications    buPROPion XL (WELLBUTRIN XL) 300 MG 24 hr tablet     Other Visit Diagnoses       Encounter for wellness examination    -  Primary    Relevant Orders    POCT urinalysis dipstick, multipro (Completed)                    An After Visit Summary and PPPS were given to the patient.       Discussed injury prevention, diet and exercise, safe sexual practices, and screening for common diseases. Encouraged use of sunscreen and seatbelts. Discussed timing of  cervical cancer screening. Encouraged monthly self-breast exams, yearly clinical breast exams, and discussed timing of mammograms. Avoidance of tobacco encouraged. Limitation or avoidance of alcohol encouraged. Recommend yearly dental and eye exams. Also discussed monitoring of blood pressure, lipids.         +++++E/M portion medically necessary secondary to new or uncontrolled chronic problem+++++++    Subjective    Alexus Daysifly Serrano is here for:    Chief Complaint   Patient presents with    Annual Exam    Hypertension    Hyperlipidemia    Diabetes       Diabetes  She presents for her follow-up diabetic visit. Diabetes type: borderline. Pertinent negatives for hypoglycemia include no dizziness or headaches. Pertinent negatives for diabetes include no chest pain and no fatigue. There are no hypoglycemic complications. There are no diabetic complications. Risk factors for coronary artery disease include dyslipidemia, hypertension and obesity. Current diabetic treatment includes diet and oral agent (monotherapy). She is compliant with treatment most of the time. She is following a diabetic and generally healthy diet. Meal planning includes avoidance of concentrated sweets. She has not had a previous visit with a dietitian. She participates in exercise intermittently. (Does not check at home ) An ACE inhibitor/angiotensin II receptor blocker is being taken. She does not see a podiatrist.Eye exam is current.   Hypertension  This is a chronic problem. The current episode started more than 1 year ago. The problem is unchanged. The problem is controlled. Pertinent negatives include no chest pain or headaches. There are no associated agents to hypertension. Risk factors for coronary artery disease include diabetes mellitus, dyslipidemia, obesity and stress. Past treatments include nothing. Current antihypertension treatment includes nothing. The current treatment provides mild improvement.   Hyperlipidemia  This is a chronic problem. The current episode started more than 1 year ago. The problem is controlled. Exacerbating diseases include diabetes and obesity. She has no history of hypothyroidism. Pertinent negatives include no chest pain. Current antihyperlipidemic treatment includes statins. The current treatment provides mild improvement of lipids. Risk factors for coronary artery disease include hypertension, dyslipidemia,  diabetes mellitus, obesity and stress.         Physical Exam:  Review of Systems   Constitutional:  Negative for fatigue.   Cardiovascular:  Negative for chest pain.   Neurological:  Negative for dizziness.        Physical Exam    Assessment and Plan:  Problem List Items Addressed This Visit          Cardiac and Vasculature    Essential hypertension    Current Assessment & Plan     Hypertension is unchanged.  Continue current treatment regimen.  Dietary sodium restriction.  Weight loss.  Blood pressure will be reassessed in 3 months.         Mixed hyperlipidemia       Endocrine and Metabolic    Class 3 severe obesity due to excess calories without serious comorbidity with body mass index (BMI) of 40.0 to 44.9 in adult    Borderline diabetes    Current Assessment & Plan     Weight has gone up  She is doing well with metformin            Mental Health    Anxiety    Relevant Medications    buPROPion XL (WELLBUTRIN XL) 300 MG 24 hr tablet    Moderate episode of recurrent major depressive disorder    Current Assessment & Plan     Patient's depression is recurrent and is moderate without psychosis. Their depression is currently active and the condition is worsening. This will be reassessed in 4 weeks. F/U as described: increase wellbutrin .    Diagnosis, treatment and and course discussed. Potential side effects discussed. Return if there is worsening or persistence of symptoms.            Relevant Medications    buPROPion XL (WELLBUTRIN XL) 300 MG 24 hr tablet     Other Visit Diagnoses       Encounter for wellness examination    -  Primary    Relevant Orders    POCT urinalysis dipstick, multipro (Completed)

## 2024-01-25 ENCOUNTER — OFFICE VISIT (OUTPATIENT)
Dept: FAMILY MEDICINE CLINIC | Facility: CLINIC | Age: 49
End: 2024-01-25
Payer: COMMERCIAL

## 2024-01-25 VITALS
WEIGHT: 259.8 LBS | RESPIRATION RATE: 18 BRPM | HEART RATE: 107 BPM | BODY MASS INDEX: 41.75 KG/M2 | SYSTOLIC BLOOD PRESSURE: 128 MMHG | OXYGEN SATURATION: 99 % | TEMPERATURE: 97.3 F | HEIGHT: 66 IN | DIASTOLIC BLOOD PRESSURE: 84 MMHG

## 2024-01-25 DIAGNOSIS — F33.1 MODERATE EPISODE OF RECURRENT MAJOR DEPRESSIVE DISORDER: ICD-10-CM

## 2024-01-25 DIAGNOSIS — I10 ESSENTIAL HYPERTENSION: ICD-10-CM

## 2024-01-25 DIAGNOSIS — E78.2 MIXED HYPERLIPIDEMIA: ICD-10-CM

## 2024-01-25 DIAGNOSIS — F41.9 ANXIETY: ICD-10-CM

## 2024-01-25 DIAGNOSIS — E66.01 CLASS 3 SEVERE OBESITY DUE TO EXCESS CALORIES WITHOUT SERIOUS COMORBIDITY WITH BODY MASS INDEX (BMI) OF 40.0 TO 44.9 IN ADULT: ICD-10-CM

## 2024-01-25 DIAGNOSIS — Z00.00 ENCOUNTER FOR WELLNESS EXAMINATION: Primary | ICD-10-CM

## 2024-01-25 DIAGNOSIS — R73.03 BORDERLINE DIABETES: ICD-10-CM

## 2024-01-25 PROBLEM — R06.02 SOB (SHORTNESS OF BREATH): Status: RESOLVED | Noted: 2023-06-19 | Resolved: 2024-01-25

## 2024-01-25 LAB
BILIRUB BLD-MCNC: NEGATIVE MG/DL
CLARITY, POC: CLEAR
COLOR UR: YELLOW
GLUCOSE UR STRIP-MCNC: NEGATIVE MG/DL
KETONES UR QL: NEGATIVE
LEUKOCYTE EST, POC: NEGATIVE
NITRITE UR-MCNC: NEGATIVE MG/ML
PH UR: 5 [PH] (ref 5–8)
PROT UR STRIP-MCNC: ABNORMAL MG/DL
RBC # UR STRIP: NEGATIVE /UL
SP GR UR: 1.02 (ref 1–1.03)
UROBILINOGEN UR QL: NORMAL

## 2024-01-25 RX ORDER — BUPROPION HYDROCHLORIDE 300 MG/1
300 TABLET ORAL DAILY
Qty: 30 TABLET | Refills: 12 | Status: SHIPPED | OUTPATIENT
Start: 2024-01-25

## 2024-02-01 NOTE — ASSESSMENT & PLAN NOTE
Patient's depression is recurrent and is moderate without psychosis. Their depression is currently active and the condition is worsening. This will be reassessed in 4 weeks. F/U as described: increase wellbutrin .    Diagnosis, treatment and and course discussed. Potential side effects discussed. Return if there is worsening or persistence of symptoms.

## 2024-03-04 NOTE — PROGRESS NOTES
Subjective   Alexus Serrano is a 48 y.o. female. Presents to [facility]    Chief Complaint   Patient presents with    Depression       Depression  Visit Type: follow-up  Patient presents with the following symptoms: fatigue, palpitations and shortness of breath.  Patient is not experiencing: chest pain, decreased concentration, depressed mood, excessive worry, feelings of hopelessness, feelings of worthlessness, insomnia, irritability, nausea, nervousness/anxiety, panic, restlessness, suicidal ideas, suicidal planning and thoughts of death.  Frequency of symptoms: most days   Severity: moderate   Sleep per night: 6 hours  Sleep quality: fair  Nighttime awakenings: one to two  Compliance with medications:  %     Wellbutrin at 300mg is helping. She has been happy with it.     I personally reviewed and updated the patient's allergies, medications, problem list, and past medical, surgical, social, and family history. I have reviewed and confirmed the accuracy of the History of Present Illness and Review of Symptoms as documented by the MA/LPN/RN. Mela Casey MD    Allergies:  No Known Allergies    Social History:  Social History     Socioeconomic History    Marital status: Single   Tobacco Use    Smoking status: Former     Current packs/day: 1.00     Average packs/day: 1 pack/day for 3.0 years (3.0 ttl pk-yrs)     Types: Cigarettes     Start date: 8/2/2021     Passive exposure: Never    Tobacco comments:     Vapes daily         Vaping Use    Vaping status: Every Day    Substances: Nicotine, Flavoring    Devices: Pre-filled or refillable cartridge   Substance and Sexual Activity    Alcohol use: Not Currently    Drug use: Not Currently     Types: Methamphetamines     Comment: clean for 27 months     Sexual activity: Not Currently       Family History:  Family History   Problem Relation Age of Onset    Cancer Mother 61        lung    Colon cancer Maternal Grandmother 62    Breast cancer  Paternal Aunt 40         from it    Breast cancer Cousin 51         from it    Ovarian cancer Neg Hx        Past Medical History :  Patient Active Problem List   Diagnosis    Fibromyalgia    DDD (degenerative disc disease), lumbar    Premenstrual dysphoric syndrome    Essential hypertension    Anxiety    Moderate episode of recurrent major depressive disorder    GERD (gastroesophageal reflux disease)    Snoring    Right ovarian cyst    Class 3 severe obesity due to excess calories without serious comorbidity with body mass index (BMI) of 40.0 to 44.9 in adult    Stress incontinence    Daytime somnolence    Mixed hyperlipidemia    Borderline diabetes    Reactive airway disease with acute exacerbation    Hiatal hernia    Abnormal CXR       Medication List:    Current Outpatient Medications:     albuterol sulfate  (90 Base) MCG/ACT inhaler, Inhale 2 puffs Every 4 (Four) Hours As Needed for Wheezing., Disp: 8 g, Rfl: 0    buPROPion XL (WELLBUTRIN XL) 300 MG 24 hr tablet, Take 1 tablet by mouth Daily., Disp: 30 tablet, Rfl: 12    coenzyme Q10 100 MG capsule, Take 1 capsule by mouth Daily., Disp: , Rfl:     meloxicam (MOBIC) 15 MG tablet, Take 1 tablet by mouth once daily, Disp: 30 tablet, Rfl: 5    metFORMIN ER (GLUCOPHAGE-XR) 500 MG 24 hr tablet, Take 1 tablet by mouth Daily With Breakfast., Disp: 30 tablet, Rfl: 12    Omega-3 1000 MG capsule, Take  by mouth., Disp: , Rfl:     omeprazole (priLOSEC) 40 MG capsule, Take 1 capsule by mouth Daily., Disp: 30 capsule, Rfl: 5    atorvastatin (LIPITOR) 10 MG tablet, Take 1 tablet by mouth once daily, Disp: 30 tablet, Rfl: 12    fluticasone (FLONASE) 50 MCG/ACT nasal spray, 2 sprays into the nostril(s) as directed by provider Daily for 30 days. Administer 2 sprays in each nostril for each dose., Disp: 16 g, Rfl: 0    Past Surgical History:  No past surgical history on file.      Physical Exam:      Vital Signs:    Vitals:    24 1635   BP:    Pulse: 91  "  Resp:    Temp:    SpO2:         /84   Pulse 91   Temp 97.3 °F (36.3 °C) (Temporal)   Resp 18   Ht 167.6 cm (66\")   Wt 117 kg (257 lb 3.2 oz)   LMP 12/15/2023 (Approximate)   SpO2 96%   BMI 41.51 kg/m²     Wt Readings from Last 3 Encounters:   03/07/24 117 kg (257 lb 3.2 oz)   01/25/24 118 kg (259 lb 12.8 oz)   12/14/23 115 kg (253 lb 12.8 oz)       Result Review :   The following data was reviewed by: Mela Casey MD on 03/07/2024:         Chest xray 2022: abnormal. She did not get her CT scan    Physical Exam  Vitals reviewed.   Constitutional:       Appearance: Normal appearance. She is well-developed.   HENT:      Head: Normocephalic and atraumatic.   Eyes:      General:         Right eye: No discharge.         Left eye: No discharge.   Cardiovascular:      Rate and Rhythm: Normal rate and regular rhythm.      Heart sounds: Normal heart sounds. No murmur heard.     No friction rub. No gallop.   Pulmonary:      Effort: Pulmonary effort is normal. No respiratory distress.      Breath sounds: Normal breath sounds. No wheezing or rales.   Skin:     General: Skin is warm and dry.      Findings: No rash.   Neurological:      Mental Status: She is alert and oriented to person, place, and time.      Coordination: Coordination normal.      Gait: Gait normal.   Psychiatric:         Behavior: Behavior is cooperative.         Assessment and Plan:  Problems Addressed this Visit          Endocrine and Metabolic    Class 3 severe obesity due to excess calories without serious comorbidity with body mass index (BMI) of 40.0 to 44.9 in adult       Gastrointestinal Abdominal     GERD (gastroesophageal reflux disease)     Worse  Limit tobacco, alcohol, caffeine, chocalate, citrus fruits, recumbency after meals and large portions. Discussed link between PPI's and increased risk of hip, wrist, and spine fractures           Relevant Medications    omeprazole (priLOSEC) 40 MG capsule    Hiatal hernia     Will check " in CT scan  Start omeprazole         Relevant Medications    omeprazole (priLOSEC) 40 MG capsule       Mental Health    Moderate episode of recurrent major depressive disorder - Primary     Patient's depression is a recurrent episode that is moderate without psychosis. Depression is active and improving with treatment.    Plan:   Continue current medication therapy     Followup in 6 months.             Pulmonary and Pneumonias    Abnormal CXR     She did not get the CT scan last year. She is having more issues with her hiatal hernia.   CXR showed retrocardial mass         Relevant Orders    CT Chest With Contrast (Completed)     Other Visit Diagnoses       Chronic cough        Relevant Orders    CT Chest With Contrast (Completed)          Diagnoses         Codes Comments    Moderate episode of recurrent major depressive disorder    -  Primary ICD-10-CM: F33.1  ICD-9-CM: 296.32     Class 3 severe obesity due to excess calories without serious comorbidity with body mass index (BMI) of 40.0 to 44.9 in adult     ICD-10-CM: E66.01, Z68.41  ICD-9-CM: 278.01, V85.41     Gastroesophageal reflux disease, unspecified whether esophagitis present     ICD-10-CM: K21.9  ICD-9-CM: 530.81     Hiatal hernia     ICD-10-CM: K44.9  ICD-9-CM: 553.3     Abnormal CXR     ICD-10-CM: R93.89  ICD-9-CM: 793.2     Chronic cough     ICD-10-CM: R05.3  ICD-9-CM: 786.2                     An After Visit Summary and PPPS were given to the patient.

## 2024-03-07 ENCOUNTER — OFFICE VISIT (OUTPATIENT)
Dept: FAMILY MEDICINE CLINIC | Facility: CLINIC | Age: 49
End: 2024-03-07
Payer: COMMERCIAL

## 2024-03-07 ENCOUNTER — TELEPHONE (OUTPATIENT)
Dept: FAMILY MEDICINE CLINIC | Facility: CLINIC | Age: 49
End: 2024-03-07

## 2024-03-07 VITALS
OXYGEN SATURATION: 96 % | RESPIRATION RATE: 18 BRPM | SYSTOLIC BLOOD PRESSURE: 126 MMHG | TEMPERATURE: 97.3 F | HEIGHT: 66 IN | BODY MASS INDEX: 41.33 KG/M2 | WEIGHT: 257.2 LBS | HEART RATE: 91 BPM | DIASTOLIC BLOOD PRESSURE: 84 MMHG

## 2024-03-07 DIAGNOSIS — F33.1 MODERATE EPISODE OF RECURRENT MAJOR DEPRESSIVE DISORDER: Primary | ICD-10-CM

## 2024-03-07 DIAGNOSIS — K21.9 GASTROESOPHAGEAL REFLUX DISEASE, UNSPECIFIED WHETHER ESOPHAGITIS PRESENT: ICD-10-CM

## 2024-03-07 DIAGNOSIS — R05.3 CHRONIC COUGH: ICD-10-CM

## 2024-03-07 DIAGNOSIS — R93.89 ABNORMAL CXR: ICD-10-CM

## 2024-03-07 DIAGNOSIS — E66.01 CLASS 3 SEVERE OBESITY DUE TO EXCESS CALORIES WITHOUT SERIOUS COMORBIDITY WITH BODY MASS INDEX (BMI) OF 40.0 TO 44.9 IN ADULT: ICD-10-CM

## 2024-03-07 DIAGNOSIS — K44.9 HIATAL HERNIA: ICD-10-CM

## 2024-03-07 PROCEDURE — 99214 OFFICE O/P EST MOD 30 MIN: CPT | Performed by: FAMILY MEDICINE

## 2024-03-07 RX ORDER — OMEPRAZOLE 40 MG/1
40 CAPSULE, DELAYED RELEASE ORAL DAILY
Qty: 30 CAPSULE | Refills: 5 | Status: SHIPPED | OUTPATIENT
Start: 2024-03-07

## 2024-03-07 NOTE — TELEPHONE ENCOUNTER
When patient was in today I asked her about the CT scan that is overdue to be done she asks to have it scheduled again does it need another precert?

## 2024-03-07 NOTE — ASSESSMENT & PLAN NOTE
She did not get the CT scan last year. She is having more issues with her hiatal hernia.   CXR showed retrocardial mass

## 2024-03-08 ENCOUNTER — PATIENT ROUNDING (BHMG ONLY) (OUTPATIENT)
Dept: FAMILY MEDICINE CLINIC | Facility: CLINIC | Age: 49
End: 2024-03-08
Payer: COMMERCIAL

## 2024-03-08 DIAGNOSIS — E78.2 MIXED HYPERLIPIDEMIA: ICD-10-CM

## 2024-03-11 RX ORDER — ATORVASTATIN CALCIUM 10 MG/1
10 TABLET, FILM COATED ORAL DAILY
Qty: 30 TABLET | Refills: 12 | Status: SHIPPED | OUTPATIENT
Start: 2024-03-11

## 2024-04-01 DIAGNOSIS — K21.9 GASTROESOPHAGEAL REFLUX DISEASE, UNSPECIFIED WHETHER ESOPHAGITIS PRESENT: ICD-10-CM

## 2024-04-01 RX ORDER — OMEPRAZOLE 20 MG/1
20 CAPSULE, DELAYED RELEASE ORAL DAILY
Qty: 30 CAPSULE | Refills: 0 | OUTPATIENT
Start: 2024-04-01

## 2024-04-04 NOTE — PROGRESS NOTES
Subjective   Alexus Serrano is a 48 y.o. female. Presents to [facility]    Chief Complaint   Patient presents with    Urinary Frequency    Chest Pain       History of Present Illness  Patient is here to discuss CT scan results.   Urinary Frequency   This is a new problem. The current episode started 1 to 4 weeks ago. The problem occurs daily. The pain is at a severity of 0/10. The patient is experiencing no pain. There has been no fever. Associated symptoms include flank pain, frequency and urgency. Pertinent negatives include no chills, hesitancy, nausea or sweats. She has tried nothing for the symptoms.   Chest Pain   This is a new problem. The current episode started 1 to 4 weeks ago (3 weeks ago). The onset quality is undetermined. The problem occurs intermittently. The problem has been waxing and waning. The pain is present in the lateral region. The pain is mild. The quality of the pain is described as pressure and tightness. The pain does not radiate. Associated symptoms include malaise/fatigue, palpitations and weakness. Pertinent negatives include no abdominal pain, dizziness, nausea or near-syncope.      Started 3 weeks ago and its been more regular. She notices it more at night. There is extra beats in her heart about 3 times in the last 3 weeks. The tightness is not every day. The pain is random. Its not happening on exertion. She does feel like she has to stop and rest when she is doing things however. She has not gone to the ER.     I personally reviewed and updated the patient's allergies, medications, problem list, and past medical, surgical, social, and family history. I have reviewed and confirmed the accuracy of the History of Present Illness and Review of Symptoms as documented by the MA/LPN/RN. Mela Casey MD    Allergies:  No Known Allergies    Social History:  Social History     Socioeconomic History    Marital status: Single   Tobacco Use    Smoking status: Former      Current packs/day: 1.00     Average packs/day: 1 pack/day for 3.1 years (3.1 ttl pk-yrs)     Types: Cigarettes     Start date: 2021     Passive exposure: Never    Tobacco comments:     Vapes daily         Vaping Use    Vaping status: Every Day    Substances: Nicotine, Flavoring    Devices: Pre-filled or refillable cartridge   Substance and Sexual Activity    Alcohol use: Not Currently    Drug use: Not Currently     Types: Methamphetamines     Comment: clean for 27 months     Sexual activity: Not Currently       Family History:  Family History   Problem Relation Age of Onset    Cancer Mother 61        lung    Coronary artery disease Mother 48    Heart attack Father 58    Coronary artery disease Father     Colon cancer Maternal Grandmother 62    Breast cancer Paternal Aunt 40         from it    Breast cancer Cousin 51         from it    Ovarian cancer Neg Hx        Past Medical History :  Patient Active Problem List   Diagnosis    Fibromyalgia    DDD (degenerative disc disease), lumbar    Premenstrual dysphoric syndrome    Essential hypertension    Anxiety    Moderate episode of recurrent major depressive disorder    GERD (gastroesophageal reflux disease)    Snoring    Right ovarian cyst    Class 3 severe obesity due to excess calories without serious comorbidity with body mass index (BMI) of 40.0 to 44.9 in adult    Stress incontinence    Daytime somnolence    Mixed hyperlipidemia    Borderline diabetes    Reactive airway disease with acute exacerbation    Hiatal hernia    Abnormal CXR    Palpitations       Medication List:    Current Outpatient Medications:     albuterol sulfate  (90 Base) MCG/ACT inhaler, Inhale 2 puffs Every 4 (Four) Hours As Needed for Wheezing., Disp: 8 g, Rfl: 0    atorvastatin (LIPITOR) 10 MG tablet, Take 1 tablet by mouth once daily, Disp: 30 tablet, Rfl: 12    buPROPion XL (WELLBUTRIN XL) 300 MG 24 hr tablet, Take 1 tablet by mouth Daily., Disp: 30 tablet, Rfl: 12     "coenzyme Q10 100 MG capsule, Take 1 capsule by mouth Daily., Disp: , Rfl:     estradiol-norethindrone (ACTIVELLA) 1-0.5 MG per tablet, Take 1 tablet by mouth Daily., Disp: , Rfl:     meloxicam (MOBIC) 15 MG tablet, Take 1 tablet by mouth once daily, Disp: 30 tablet, Rfl: 5    metFORMIN ER (GLUCOPHAGE-XR) 500 MG 24 hr tablet, Take 1 tablet by mouth Daily With Breakfast., Disp: 30 tablet, Rfl: 12    Omega-3 1000 MG capsule, Take  by mouth., Disp: , Rfl:     omeprazole (priLOSEC) 40 MG capsule, Take 1 capsule by mouth Daily., Disp: 30 capsule, Rfl: 5    fluticasone (FLONASE) 50 MCG/ACT nasal spray, 2 sprays into the nostril(s) as directed by provider Daily for 30 days. Administer 2 sprays in each nostril for each dose., Disp: 16 g, Rfl: 0    sulfamethoxazole-trimethoprim (BACTRIM DS,SEPTRA DS) 800-160 MG per tablet, Take 1 tablet by mouth 2 (Two) Times a Day., Disp: 14 tablet, Rfl: 0    Past Surgical History:  No past surgical history on file.      Physical Exam:      Vital Signs:    Vitals:    04/09/24 1455   BP: 126/84   Pulse: 108   Resp: 18   Temp: 97.1 °F (36.2 °C)   SpO2: 97%        /84 (BP Location: Right arm, Patient Position: Sitting, Cuff Size: Adult)   Pulse 108   Temp 97.1 °F (36.2 °C) (Temporal)   Resp 18   Ht 167.6 cm (66\")   Wt 118 kg (260 lb 6.4 oz)   LMP 03/30/2024   SpO2 97% Comment: room air  BMI 42.03 kg/m²     Wt Readings from Last 3 Encounters:   04/09/24 118 kg (260 lb 6.4 oz)   03/07/24 117 kg (257 lb 3.2 oz)   01/25/24 118 kg (259 lb 12.8 oz)       Result Review :   The following data was reviewed by: Mela Casey MD on 04/09/2024:           CT scan 3/2024: large hiatal hernia    Physical Exam  Vitals reviewed.   Constitutional:       Appearance: Normal appearance. She is well-developed.   HENT:      Head: Normocephalic and atraumatic.   Eyes:      General:         Right eye: No discharge.         Left eye: No discharge.   Cardiovascular:      Rate and Rhythm: Normal rate and " regular rhythm.      Heart sounds: Normal heart sounds. No murmur heard.     No friction rub. No gallop.   Pulmonary:      Effort: Pulmonary effort is normal. No respiratory distress.      Breath sounds: Normal breath sounds. No wheezing or rales.   Skin:     General: Skin is warm and dry.      Findings: No rash.   Neurological:      Mental Status: She is alert and oriented to person, place, and time.      Coordination: Coordination normal.      Gait: Gait normal.   Psychiatric:         Behavior: Behavior is cooperative.         Assessment and Plan:  Problems Addressed this Visit          Cardiac and Vasculature    Palpitations    Relevant Orders    Holter Monitor - 72 Hour Up To 15 Days    Ambulatory Referral to Cardiology       Endocrine and Metabolic    Class 3 severe obesity due to excess calories without serious comorbidity with body mass index (BMI) of 40.0 to 44.9 in adult       Gastrointestinal Abdominal     Hiatal hernia     Large. Noted on CT scan done at [facility]. She is having symptoms  Will have her see surgery         Relevant Orders    Ambulatory Referral to General Surgery     Other Visit Diagnoses       Acute UTI    -  Primary    Relevant Medications    sulfamethoxazole-trimethoprim (BACTRIM DS,SEPTRA DS) 800-160 MG per tablet    Other Relevant Orders    POCT urinalysis dipstick, multipro (Completed)    Urine Culture - Urine, Urine, Random Void    Urinalysis With Microscopic - Urine, Clean Catch    Chest pain, unspecified type        Relevant Orders    CK Total & CKMB    Troponin T    XR Chest PA & Lateral    Ambulatory Referral to Cardiology          Diagnoses         Codes Comments    Acute UTI    -  Primary ICD-10-CM: N39.0  ICD-9-CM: 599.0     Chest pain, unspecified type     ICD-10-CM: R07.9  ICD-9-CM: 786.50     Hiatal hernia     ICD-10-CM: K44.9  ICD-9-CM: 553.3     Class 3 severe obesity due to excess calories without serious comorbidity with body mass index (BMI) of 40.0 to 44.9 in adult      ICD-10-CM: E66.01, Z68.41  ICD-9-CM: 278.01, V85.41     Palpitations     ICD-10-CM: R00.2  ICD-9-CM: 785.1                          An After Visit Summary and PPPS were given to the patient.

## 2024-04-09 ENCOUNTER — OFFICE VISIT (OUTPATIENT)
Dept: FAMILY MEDICINE CLINIC | Facility: CLINIC | Age: 49
End: 2024-04-09
Payer: COMMERCIAL

## 2024-04-09 VITALS
HEIGHT: 66 IN | OXYGEN SATURATION: 97 % | BODY MASS INDEX: 41.85 KG/M2 | TEMPERATURE: 97.1 F | HEART RATE: 108 BPM | WEIGHT: 260.4 LBS | RESPIRATION RATE: 18 BRPM | SYSTOLIC BLOOD PRESSURE: 126 MMHG | DIASTOLIC BLOOD PRESSURE: 84 MMHG

## 2024-04-09 DIAGNOSIS — K44.9 HIATAL HERNIA: ICD-10-CM

## 2024-04-09 DIAGNOSIS — N39.0 ACUTE UTI: Primary | ICD-10-CM

## 2024-04-09 DIAGNOSIS — R07.9 CHEST PAIN, UNSPECIFIED TYPE: ICD-10-CM

## 2024-04-09 DIAGNOSIS — R00.2 PALPITATIONS: ICD-10-CM

## 2024-04-09 DIAGNOSIS — E66.01 CLASS 3 SEVERE OBESITY DUE TO EXCESS CALORIES WITHOUT SERIOUS COMORBIDITY WITH BODY MASS INDEX (BMI) OF 40.0 TO 44.9 IN ADULT: ICD-10-CM

## 2024-04-09 LAB
BILIRUB BLD-MCNC: NEGATIVE MG/DL
CLARITY, POC: ABNORMAL
COLOR UR: YELLOW
GLUCOSE UR STRIP-MCNC: NEGATIVE MG/DL
KETONES UR QL: NEGATIVE
LEUKOCYTE EST, POC: NEGATIVE
NITRITE UR-MCNC: NEGATIVE MG/ML
PH UR: 5 [PH] (ref 5–8)
PROT UR STRIP-MCNC: ABNORMAL MG/DL
RBC # UR STRIP: ABNORMAL /UL
SP GR UR: 1.01 (ref 1–1.03)
UROBILINOGEN UR QL: NORMAL

## 2024-04-09 PROCEDURE — 99214 OFFICE O/P EST MOD 30 MIN: CPT | Performed by: FAMILY MEDICINE

## 2024-04-09 PROCEDURE — 93000 ELECTROCARDIOGRAM COMPLETE: CPT | Performed by: FAMILY MEDICINE

## 2024-04-09 PROCEDURE — 81003 URINALYSIS AUTO W/O SCOPE: CPT | Performed by: FAMILY MEDICINE

## 2024-04-09 RX ORDER — ESTRADIOL AND NORETHINDRONE ACETATE 1; .5 MG/1; MG/1
1 TABLET ORAL DAILY
COMMUNITY
Start: 2024-04-03

## 2024-04-09 RX ORDER — SULFAMETHOXAZOLE AND TRIMETHOPRIM 800; 160 MG/1; MG/1
1 TABLET ORAL 2 TIMES DAILY
Qty: 14 TABLET | Refills: 0 | Status: SHIPPED | OUTPATIENT
Start: 2024-04-09

## 2024-04-10 LAB
APPEARANCE UR: CLEAR
BACTERIA #/AREA URNS HPF: NORMAL /[HPF]
BILIRUB UR QL STRIP: NEGATIVE
CASTS URNS QL MICRO: NORMAL /LPF
COLOR UR: YELLOW
EPI CELLS #/AREA URNS HPF: NORMAL /HPF (ref 0–10)
GLUCOSE UR QL STRIP: NEGATIVE
HGB UR QL STRIP: ABNORMAL
KETONES UR QL STRIP: NEGATIVE
LEUKOCYTE ESTERASE UR QL STRIP: NEGATIVE
MICRO URNS: ABNORMAL
NITRITE UR QL STRIP: NEGATIVE
PH UR STRIP: 6 [PH] (ref 5–7.5)
PROT UR QL STRIP: NEGATIVE
RBC #/AREA URNS HPF: NORMAL /HPF (ref 0–2)
SP GR UR STRIP: 1.02 (ref 1–1.03)
UROBILINOGEN UR STRIP-MCNC: 0.2 MG/DL (ref 0.2–1)
WBC #/AREA URNS HPF: NORMAL /HPF (ref 0–5)

## 2024-04-11 ENCOUNTER — TELEPHONE (OUTPATIENT)
Dept: FAMILY MEDICINE CLINIC | Facility: CLINIC | Age: 49
End: 2024-04-11
Payer: COMMERCIAL

## 2024-04-11 LAB
BACTERIA UR CULT: NORMAL
BACTERIA UR CULT: NORMAL

## 2024-04-11 NOTE — TELEPHONE ENCOUNTER
----- Message from Alexus Serrano sent at 4/10/2024  7:22 AM EDT -----  Regarding: A few questions from my visit yesterday   Contact: [phone]  I had a few questions that I didn't think forgot to ask yesterday...  Dr. Casey said she was giving me a prescription for Compound Wegovy, will the compound pharmacy contact me with this information or is there anything I need to do in regards to this?   Also, I forgot to ask for a prescription for Flonase or something else to help with my seasonal allergies.  Also, was my urine screen positive for a UTI or negative?   Thank you guys so much for the excellent care you showed me yesterday and everyday.

## 2024-04-17 ENCOUNTER — TELEPHONE (OUTPATIENT)
Dept: SURGERY | Facility: CLINIC | Age: 49
End: 2024-04-17
Payer: COMMERCIAL

## 2024-04-18 ENCOUNTER — HOSPITAL ENCOUNTER (OUTPATIENT)
Dept: RESPIRATORY THERAPY | Facility: HOSPITAL | Age: 49
Discharge: HOME | End: 2024-04-18
Payer: COMMERCIAL

## 2024-04-18 DIAGNOSIS — R00.2 PALPITATIONS: ICD-10-CM

## 2024-04-18 PROCEDURE — 93242 EXT ECG>48HR<7D RECORDING: CPT

## 2024-04-19 ENCOUNTER — TELEPHONE (OUTPATIENT)
Dept: SURGERY | Facility: CLINIC | Age: 49
End: 2024-04-19
Payer: COMMERCIAL

## 2024-04-22 ENCOUNTER — TELEPHONE (OUTPATIENT)
Dept: SURGERY | Facility: CLINIC | Age: 49
End: 2024-04-22
Payer: COMMERCIAL

## 2024-04-24 ENCOUNTER — TELEPHONE (OUTPATIENT)
Dept: SURGERY | Facility: CLINIC | Age: 49
End: 2024-04-24
Payer: COMMERCIAL

## 2024-04-24 NOTE — TELEPHONE ENCOUNTER
CALLED PATIENT TO SCHEDULE NEW PATIENT APPOINTMENT IN [place] NO ANSWER LEFT MESSAGE TO CALL BACK. CALL BACK NUMBER LEFT AS WELL. OK FOR HUB TO SCHEDULE

## 2024-05-09 ENCOUNTER — OFFICE VISIT (OUTPATIENT)
Dept: FAMILY MEDICINE CLINIC | Facility: CLINIC | Age: 49
End: 2024-05-09
Payer: COMMERCIAL

## 2024-05-09 VITALS
BODY MASS INDEX: 39.73 KG/M2 | TEMPERATURE: 97.7 F | RESPIRATION RATE: 17 BRPM | WEIGHT: 247.2 LBS | HEART RATE: 110 BPM | OXYGEN SATURATION: 97 % | DIASTOLIC BLOOD PRESSURE: 84 MMHG | SYSTOLIC BLOOD PRESSURE: 122 MMHG | HEIGHT: 66 IN

## 2024-05-09 DIAGNOSIS — K44.9 HIATAL HERNIA: ICD-10-CM

## 2024-05-09 DIAGNOSIS — E66.09 CLASS 2 OBESITY DUE TO EXCESS CALORIES WITHOUT SERIOUS COMORBIDITY WITH BODY MASS INDEX (BMI) OF 39.0 TO 39.9 IN ADULT: ICD-10-CM

## 2024-05-09 DIAGNOSIS — R31.29 MICROHEMATURIA: ICD-10-CM

## 2024-05-09 DIAGNOSIS — R00.2 PALPITATIONS: Primary | ICD-10-CM

## 2024-05-09 LAB
BILIRUB BLD-MCNC: NEGATIVE MG/DL
CLARITY, POC: CLEAR
COLOR UR: YELLOW
GLUCOSE UR STRIP-MCNC: NEGATIVE MG/DL
KETONES UR QL: NEGATIVE
LEUKOCYTE EST, POC: NEGATIVE
NITRITE UR-MCNC: NEGATIVE MG/ML
PH UR: 6 [PH] (ref 5–8)
PROT UR STRIP-MCNC: NEGATIVE MG/DL
RBC # UR STRIP: NEGATIVE /UL
SP GR UR: 1.01 (ref 1–1.03)
UROBILINOGEN UR QL: NORMAL

## 2024-05-09 PROCEDURE — 81002 URINALYSIS NONAUTO W/O SCOPE: CPT | Performed by: FAMILY MEDICINE

## 2024-05-09 PROCEDURE — 99214 OFFICE O/P EST MOD 30 MIN: CPT | Performed by: FAMILY MEDICINE

## 2024-05-09 RX ORDER — SEMAGLUTIDE 0.25 MG/.5ML
INJECTION, SOLUTION SUBCUTANEOUS
COMMUNITY
End: 2024-05-09 | Stop reason: DRUGHIGH

## 2024-05-09 NOTE — PROGRESS NOTES
Subjective   Alexus Serrano is a 48 y.o. female. Presents to [facility]    Chief Complaint   Patient presents with    Palpitations       Palpitations   This is a recurrent problem. The current episode started more than 1 month ago. The problem occurs every several days. The problem has been waxing and waning. Nothing aggravates the symptoms. Pertinent negatives include no anxiety, chest fullness, dizziness, irregular heartbeat, malaise/fatigue, shortness of breath or weakness. She has tried nothing for the symptoms.      She has not had any more palpitations. She is feeling well.   She is doing well with the compound wegovy.       I personally reviewed and updated the patient's allergies, medications, problem list, and past medical, surgical, social, and family history. I have reviewed and confirmed the accuracy of the History of Present Illness and Review of Symptoms as documented by the MA/LPN/RN. Mela Casey MD    Allergies:  No Known Allergies    Social History:  Social History     Socioeconomic History    Marital status: Single   Tobacco Use    Smoking status: Former     Current packs/day: 1.00     Average packs/day: 1 pack/day for 3.2 years (3.2 ttl pk-yrs)     Types: Cigarettes     Start date: 2021     Passive exposure: Never    Tobacco comments:     Vapes daily         Vaping Use    Vaping status: Every Day    Substances: Nicotine, Flavoring    Devices: Pre-filled or refillable cartridge   Substance and Sexual Activity    Alcohol use: Not Currently    Drug use: Not Currently     Types: Methamphetamines     Comment: clean for 27 months     Sexual activity: Not Currently       Family History:  Family History   Problem Relation Age of Onset    Cancer Mother 61        lung    Coronary artery disease Mother 48    Heart attack Father 58    Coronary artery disease Father     Colon cancer Maternal Grandmother 62    Breast cancer Paternal Aunt 40         from it    Breast cancer  Cousin 51         from it    Ovarian cancer Neg Hx        Past Medical History :  Patient Active Problem List   Diagnosis    Fibromyalgia    DDD (degenerative disc disease), lumbar    Premenstrual dysphoric syndrome    Essential hypertension    Anxiety    Moderate episode of recurrent major depressive disorder    GERD (gastroesophageal reflux disease)    Snoring    Right ovarian cyst    Class 2 obesity due to excess calories without serious comorbidity with body mass index (BMI) of 39.0 to 39.9 in adult    Stress incontinence    Daytime somnolence    Mixed hyperlipidemia    Borderline diabetes    Reactive airway disease with acute exacerbation    Hiatal hernia    Abnormal CXR    Palpitations    Microhematuria       Medication List:    Current Outpatient Medications:     albuterol sulfate  (90 Base) MCG/ACT inhaler, Inhale 2 puffs Every 4 (Four) Hours As Needed for Wheezing., Disp: 8 g, Rfl: 0    atorvastatin (LIPITOR) 10 MG tablet, Take 1 tablet by mouth once daily, Disp: 30 tablet, Rfl: 12    buPROPion XL (WELLBUTRIN XL) 300 MG 24 hr tablet, Take 1 tablet by mouth Daily., Disp: 30 tablet, Rfl: 12    coenzyme Q10 100 MG capsule, Take 1 capsule by mouth Daily., Disp: , Rfl:     estradiol-norethindrone (ACTIVELLA) 1-0.5 MG per tablet, Take 1 tablet by mouth Daily., Disp: , Rfl:     meloxicam (MOBIC) 15 MG tablet, Take 1 tablet by mouth once daily, Disp: 30 tablet, Rfl: 5    metFORMIN ER (GLUCOPHAGE-XR) 500 MG 24 hr tablet, Take 1 tablet by mouth Daily With Breakfast., Disp: 30 tablet, Rfl: 12    Omega-3 1000 MG capsule, Take  by mouth., Disp: , Rfl:     omeprazole (priLOSEC) 40 MG capsule, Take 1 capsule by mouth Daily., Disp: 30 capsule, Rfl: 5    fluticasone (FLONASE) 50 MCG/ACT nasal spray, 2 sprays into the nostril(s) as directed by provider Daily for 30 days. Administer 2 sprays in each nostril for each dose., Disp: 16 g, Rfl: 0    Semaglutide-Weight Management 0.5 MG/0.5ML solution auto-injector,  "Inject 0.5 mL under the skin into the appropriate area as directed 1 (One) Time Per Week., Disp: 2 mL, Rfl: 3    Past Surgical History:  No past surgical history on file.      Physical Exam:      Vital Signs:    Vitals:    05/09/24 1621   BP: 122/84   Pulse: 110   Resp: 17   Temp: 97.7 °F (36.5 °C)   SpO2: 97%        /84 (BP Location: Right arm, Patient Position: Sitting, Cuff Size: Adult)   Pulse 110   Temp 97.7 °F (36.5 °C) (Temporal)   Resp 17   Ht 167.6 cm (66\")   Wt 112 kg (247 lb 3.2 oz)   LMP 05/02/2024   SpO2 97% Comment: room air  BMI 39.90 kg/m²     Wt Readings from Last 3 Encounters:   05/09/24 112 kg (247 lb 3.2 oz)   04/09/24 118 kg (260 lb 6.4 oz)   03/07/24 117 kg (257 lb 3.2 oz)       Result Review :   The following data was reviewed by: Mela Casey MD on 05/09/2024:             Brief Urine Lab Results  (Last result in the past 365 days)        Color   Clarity   Blood   Leuk Est   Nitrite   Protein   CREAT   Urine HCG        05/09/24 1648 Yellow   Clear   Negative   Negative   Negative   Negative                     Physical Exam  Vitals reviewed.   Constitutional:       Appearance: Normal appearance. She is well-developed.   HENT:      Head: Normocephalic and atraumatic.   Eyes:      General:         Right eye: No discharge.         Left eye: No discharge.   Cardiovascular:      Rate and Rhythm: Normal rate and regular rhythm.      Heart sounds: Normal heart sounds. No murmur heard.     No friction rub. No gallop.   Pulmonary:      Effort: Pulmonary effort is normal. No respiratory distress.      Breath sounds: Normal breath sounds. No wheezing or rales.   Skin:     General: Skin is warm and dry.      Findings: No rash.   Neurological:      Mental Status: She is alert and oriented to person, place, and time.      Coordination: Coordination normal.      Gait: Gait normal.   Psychiatric:         Behavior: Behavior is cooperative.         Assessment and Plan:  Problems Addressed this " Visit          Cardiac and Vasculature    Palpitations - Primary     Much better  Continue current treatment            Endocrine and Metabolic    Class 2 obesity due to excess calories without serious comorbidity with body mass index (BMI) of 39.0 to 39.9 in adult    Relevant Medications    Semaglutide-Weight Management 0.5 MG/0.5ML solution auto-injector       Gastrointestinal Abdominal     Hiatal hernia     She declines seeing surgeon    She wants to work on weight loss first.             Genitourinary and Reproductive     Microhematuria     resolved         Relevant Orders    POCT urinalysis dipstick, manual (Completed)     Diagnoses         Codes Comments    Palpitations    -  Primary ICD-10-CM: R00.2  ICD-9-CM: 785.1     Class 2 obesity due to excess calories without serious comorbidity with body mass index (BMI) of 39.0 to 39.9 in adult     ICD-10-CM: E66.09, Z68.39  ICD-9-CM: 278.00, V85.39     Hiatal hernia     ICD-10-CM: K44.9  ICD-9-CM: 553.3     Microhematuria     ICD-10-CM: R31.29  ICD-9-CM: 599.72            Many types of weight loss programs attempted with no success. Exercise has not helped with weight loss  There is no family history of MEN tumors or thyroid cancer. Advised GLP1 medications can cause nausea, vomiting, diarrhea, constipation, pancreatitis. If there is any hoarseness, neck swelling, advised to stop the medication and let me know. If there is severe abdominal pain or intractable vomiting, dehydration, advised to go to the ER    Risk vs benefit of compounded medication discussed with patient input. Risks of fatty liver, diabetes, hypertension, osteoarthritis, sleep apnea, and heart disease with obesity outweighs potential side effects of the medication.     If patient is female and is of child bearing age: Potential risks of increased fertility and birth defects discussed. Discussed and strongly encouraged reliable birth control. When patient is wanting to conceive, discussed and  encouraged to stop GLP1 for 90 days prior to starting.                 An After Visit Summary and PPPS were given to the patient.

## 2024-05-27 DIAGNOSIS — M79.7 FIBROMYALGIA: ICD-10-CM

## 2024-05-27 DIAGNOSIS — M51.36 DDD (DEGENERATIVE DISC DISEASE), LUMBAR: ICD-10-CM

## 2024-05-28 RX ORDER — MELOXICAM 15 MG/1
15 TABLET ORAL DAILY
Qty: 30 TABLET | Refills: 0 | Status: SHIPPED | OUTPATIENT
Start: 2024-05-28

## 2024-06-24 DIAGNOSIS — M51.36 DDD (DEGENERATIVE DISC DISEASE), LUMBAR: ICD-10-CM

## 2024-06-24 DIAGNOSIS — M79.7 FIBROMYALGIA: ICD-10-CM

## 2024-06-24 RX ORDER — MELOXICAM 15 MG/1
15 TABLET ORAL DAILY
Qty: 30 TABLET | Refills: 0 | Status: SHIPPED | OUTPATIENT
Start: 2024-06-24

## 2024-07-09 NOTE — PROGRESS NOTES
Subjective   Alexus Serrano is a 48 y.o. female. Presents to [facility]    Chief Complaint   Patient presents with    Obesity       Obesity  This is a chronic problem. The current episode started more than 1 month ago. The problem has been gradually improving. Pertinent negatives include no abdominal pain, fatigue, headaches, vomiting or weakness. Treatments tried: semaglutide. The treatment provided moderate relief.        I personally reviewed and updated the patient's allergies, medications, problem list, and past medical, surgical, social, and family history. I have reviewed and confirmed the accuracy of the History of Present Illness and Review of Symptoms as documented by the MA/LPN/RN. Mela Casey MD    Allergies:  No Known Allergies    Social History:  Social History     Socioeconomic History    Marital status: Single   Tobacco Use    Smoking status: Former     Current packs/day: 1.00     Average packs/day: 1 pack/day for 3.4 years (3.4 ttl pk-yrs)     Types: Cigarettes     Start date: 2021     Passive exposure: Never    Tobacco comments:     Vapes daily         Vaping Use    Vaping status: Every Day    Substances: Nicotine, Flavoring    Devices: Pre-filled or refillable cartridge   Substance and Sexual Activity    Alcohol use: Not Currently    Drug use: Not Currently     Types: Methamphetamines     Comment: clean for 27 months     Sexual activity: Not Currently       Family History:  Family History   Problem Relation Age of Onset    Cancer Mother 61        lung    Coronary artery disease Mother 48    Heart attack Father 58    Coronary artery disease Father     Colon cancer Maternal Grandmother 62    Breast cancer Paternal Aunt 40         from it    Breast cancer Cousin 51         from it    Ovarian cancer Neg Hx        Past Medical History :  Patient Active Problem List   Diagnosis    Fibromyalgia    DDD (degenerative disc disease), lumbar    Premenstrual dysphoric  syndrome    Essential hypertension    Anxiety    Moderate episode of recurrent major depressive disorder    GERD (gastroesophageal reflux disease)    Snoring    Right ovarian cyst    Class 2 severe obesity due to excess calories with serious comorbidity and body mass index (BMI) of 37.0 to 37.9 in adult    Stress incontinence    Daytime somnolence    Mixed hyperlipidemia    Borderline diabetes    Reactive airway disease with acute exacerbation    Hiatal hernia    Abnormal CXR    Palpitations    Microhematuria       Medication List:    Current Outpatient Medications:     albuterol sulfate  (90 Base) MCG/ACT inhaler, Inhale 2 puffs Every 4 (Four) Hours As Needed for Wheezing., Disp: 8 g, Rfl: 0    atorvastatin (LIPITOR) 10 MG tablet, Take 1 tablet by mouth once daily, Disp: 30 tablet, Rfl: 12    buPROPion XL (WELLBUTRIN XL) 300 MG 24 hr tablet, Take 1 tablet by mouth Daily., Disp: 30 tablet, Rfl: 12    coenzyme Q10 100 MG capsule, Take 1 capsule by mouth Daily., Disp: , Rfl:     estradiol-norethindrone (ACTIVELLA) 1-0.5 MG per tablet, Take 1 tablet by mouth Daily., Disp: , Rfl:     meloxicam (MOBIC) 15 MG tablet, Take 1 tablet by mouth once daily, Disp: 30 tablet, Rfl: 0    metFORMIN ER (GLUCOPHAGE-XR) 500 MG 24 hr tablet, Take 1 tablet by mouth Daily With Breakfast., Disp: 30 tablet, Rfl: 12    mupirocin (BACTROBAN) 2 % ointment, Apply 1 Application topically to the appropriate area as directed 2 (Two) Times a Day., Disp: 22 g, Rfl: 0    Omega-3 1000 MG capsule, Take  by mouth., Disp: , Rfl:     omeprazole (priLOSEC) 40 MG capsule, Take 1 capsule by mouth Daily., Disp: 30 capsule, Rfl: 5    fluticasone (FLONASE) 50 MCG/ACT nasal spray, 2 sprays into the nostril(s) as directed by provider Daily for 30 days. Administer 2 sprays in each nostril for each dose., Disp: 16 g, Rfl: 0    Semaglutide, 1 MG/DOSE, (Ozempic, 1 MG/DOSE,) 2 MG/1.5ML solution pen-injector, Inject 1 mg under the skin into the appropriate area  "as directed 1 (One) Time Per Week., Disp: 3 mL, Rfl: 4    Past Surgical History:  Past Surgical History:   Procedure Laterality Date    CHOLECYSTECTOMY      TUBAL ABDOMINAL LIGATION           Physical Exam:      Vital Signs:    Vitals:    07/11/24 1612   BP: 124/84   Pulse: 102   Resp: 17   Temp: 97.1 °F (36.2 °C)   SpO2: 99%        /84 (BP Location: Right arm, Patient Position: Sitting, Cuff Size: Adult)   Pulse 102   Temp 97.1 °F (36.2 °C) (Temporal)   Resp 17   Ht 167.6 cm (66\")   Wt 106 kg (233 lb)   LMP 06/13/2024   SpO2 99% Comment: room air  BMI 37.61 kg/m²     Wt Readings from Last 3 Encounters:   07/11/24 106 kg (233 lb)   07/10/24 104 kg (230 lb)   05/09/24 112 kg (247 lb 3.2 oz)       Result Review :                Physical Exam  Vitals reviewed.   Constitutional:       Appearance: Normal appearance. She is well-developed.   HENT:      Head: Normocephalic and atraumatic.   Eyes:      General:         Right eye: No discharge.         Left eye: No discharge.   Cardiovascular:      Rate and Rhythm: Normal rate and regular rhythm.      Heart sounds: Normal heart sounds. No murmur heard.     No friction rub. No gallop.   Pulmonary:      Effort: Pulmonary effort is normal. No respiratory distress.      Breath sounds: Normal breath sounds. No wheezing or rales.   Skin:     General: Skin is warm and dry.      Findings: No rash.   Neurological:      Mental Status: She is alert and oriented to person, place, and time.      Coordination: Coordination normal.      Gait: Gait normal.   Psychiatric:         Behavior: Behavior is cooperative.         Assessment and Plan:  Problems Addressed this Visit          Endocrine and Metabolic    Class 2 severe obesity due to excess calories with serious comorbidity and body mass index (BMI) of 37.0 to 37.9 in adult - Primary     Patient's (Body mass index is 37.61 kg/m².) indicates that they are morbidly/severely obese (BMI > 40 or > 35 with obesity - related " health condition) with health conditions that include diabetes mellitus and dyslipidemias . Weight is worsening. BMI  is above average; BMI management plan is completed. We discussed low calorie, low carb based diet program, portion control, increasing exercise, and pharmacologic options including increase wegovy  Diagnosis, treatment and and course discussed. Potential side effects discussed. Return if there is worsening or persistence of symptoms.   .          Relevant Medications    Semaglutide, 1 MG/DOSE, (Ozempic, 1 MG/DOSE,) 2 MG/1.5ML solution pen-injector     Diagnoses         Codes Comments    Class 2 severe obesity due to excess calories with serious comorbidity and body mass index (BMI) of 37.0 to 37.9 in adult    -  Primary ICD-10-CM: E66.01, Z68.37  ICD-9-CM: 278.01, V85.37                          An After Visit Summary and PPPS were given to the patient.

## 2024-07-10 ENCOUNTER — TELEMEDICINE (OUTPATIENT)
Dept: FAMILY MEDICINE CLINIC | Facility: TELEHEALTH | Age: 49
End: 2024-07-10
Payer: COMMERCIAL

## 2024-07-10 VITALS — WEIGHT: 230 LBS | BODY MASS INDEX: 36.96 KG/M2 | HEIGHT: 66 IN

## 2024-07-10 DIAGNOSIS — B00.1 COLD SORE: Primary | ICD-10-CM

## 2024-07-10 PROCEDURE — 99213 OFFICE O/P EST LOW 20 MIN: CPT | Performed by: NURSE PRACTITIONER

## 2024-07-10 NOTE — PROGRESS NOTES
You have chosen to receive care through a telehealth visit.  Do you consent to use a video/audio connection for your medical care today? Yes     HPI  Alexus Serrano is a 48 y.o. female  presents with complaint of I had a sore on her lip and believes she has developed a secondary staph infection, right under the healing sore. The sore is on her left lower lip. It started 3-4 days ago and it began to heal. She did put ice packs on it and is taking ibuprofen for the discomfort. She reports that she has now begun to develop an infection under the original sore on her lip. It now has a white center under it. She does have a history of staph infections.    Review of Systems   Skin:         Cold sore, left lower lip, tender, white center       Past Medical History:   Diagnosis Date    Anxiety     Depression     Fibromyalgia     GERD (gastroesophageal reflux disease)     Pelvic pain 10/12/2021    Rash 2022       Family History   Problem Relation Age of Onset    Cancer Mother 61        lung    Coronary artery disease Mother 48    Heart attack Father 58    Coronary artery disease Father     Colon cancer Maternal Grandmother 62    Breast cancer Paternal Aunt 40         from it    Breast cancer Cousin 51         from it    Ovarian cancer Neg Hx        Social History     Socioeconomic History    Marital status: Single   Tobacco Use    Smoking status: Former     Current packs/day: 1.00     Average packs/day: 1 pack/day for 3.3 years (3.3 ttl pk-yrs)     Types: Cigarettes     Start date: 2021     Passive exposure: Never    Tobacco comments:     Vapes daily         Vaping Use    Vaping status: Every Day    Substances: Nicotine, Flavoring    Devices: Pre-filled or refillable cartridge   Substance and Sexual Activity    Alcohol use: Not Currently    Drug use: Not Currently     Types: Methamphetamines     Comment: clean for 27 months     Sexual activity: Not Currently       Alexus Serrano  reports that she has  "quit smoking. Her smoking use included cigarettes. She started smoking about 2 years ago. She has a 3.3 pack-year smoking history. She has never been exposed to tobacco smoke. She does not have any smokeless tobacco history on file. She does not dip        Ht 167.6 cm (66\")   Wt 104 kg (230 lb)   Breastfeeding No   BMI 37.12 kg/m²     PHYSICAL EXAM  Physical Exam   Constitutional: She is oriented to person, place, and time. She appears well-developed.   HENT:   Head: Normocephalic and atraumatic.   Nose: Nose normal.   Eyes: Lids are normal. Right eye exhibits no discharge. Left eye exhibits no discharge. Right conjunctiva is not injected. Left conjunctiva is not injected.   Pulmonary/Chest:  No respiratory distress.  Neurological: She is alert and oriented to person, place, and time. No cranial nerve deficit.   Skin:   Cold sore left lower lip   Psychiatric: She has a normal mood and affect. Her speech is normal and behavior is normal. Judgment and thought content normal.       Results for orders placed or performed in visit on 05/09/24   POCT urinalysis dipstick, manual    Specimen: Urine   Result Value Ref Range    Color Yellow Yellow, Straw, Dark Yellow, Debra    Clarity, UA Clear Clear    Glucose, UA Negative Negative mg/dL    Bilirubin Negative Negative    Ketones, UA Negative Negative    Specific Gravity  1.015 1.005 - 1.030    Blood, UA Negative Negative    pH, Urine 6.0 5.0 - 8.0    Protein, POC Negative Negative mg/dL    Urobilinogen, UA Normal Normal, 0.2 E.U./dL    Leukocytes Negative Negative    Nitrite, UA Negative Negative       Diagnoses and all orders for this visit:    1. Cold sore (Primary)    Other orders  -     mupirocin (BACTROBAN) 2 % ointment; Apply 1 Application topically to the appropriate area as directed 2 (Two) Times a Day.  Dispense: 22 g; Refill: 0    Apply mupirocin ointment twice daily as directed    FOLLOW-UP  If symptoms worsen or persist follow up with PCP, [facility], [facility] or " Urgent Care    Patient verbalizes understanding of medication dosage, comfort measures, instructions for treatment and follow-up.    Helen Beatty, OBDULIA  07/10/2024  07:07 EDT    The use of a video visit has been reviewed with the patient and verbal informed consent has been obtained. Myself and Alexus Serrano participated in this visit. The patient is located in [address] [place] [address] [place] [facility].    I am located in [place], [place]. [employer] and [employer] Client were utilized. I spent 22 minutes in the patient's chart for this visit.

## 2024-07-11 ENCOUNTER — OFFICE VISIT (OUTPATIENT)
Dept: FAMILY MEDICINE CLINIC | Facility: CLINIC | Age: 49
End: 2024-07-11
Payer: COMMERCIAL

## 2024-07-11 VITALS
RESPIRATION RATE: 17 BRPM | BODY MASS INDEX: 37.45 KG/M2 | OXYGEN SATURATION: 99 % | DIASTOLIC BLOOD PRESSURE: 84 MMHG | HEART RATE: 102 BPM | HEIGHT: 66 IN | SYSTOLIC BLOOD PRESSURE: 124 MMHG | TEMPERATURE: 97.1 F | WEIGHT: 233 LBS

## 2024-07-11 DIAGNOSIS — E66.01 CLASS 2 SEVERE OBESITY DUE TO EXCESS CALORIES WITH SERIOUS COMORBIDITY AND BODY MASS INDEX (BMI) OF 37.0 TO 37.9 IN ADULT: Primary | ICD-10-CM

## 2024-07-11 PROCEDURE — 99214 OFFICE O/P EST MOD 30 MIN: CPT | Performed by: FAMILY MEDICINE

## 2024-07-11 RX ORDER — SEMAGLUTIDE 1.34 MG/ML
1 INJECTION, SOLUTION SUBCUTANEOUS WEEKLY
Qty: 3 ML | Refills: 4 | Status: SHIPPED | OUTPATIENT
Start: 2024-07-11

## 2024-07-15 RX ORDER — FLUCONAZOLE 150 MG/1
150 TABLET ORAL ONCE
Qty: 1 TABLET | Refills: 0 | Status: SHIPPED | OUTPATIENT
Start: 2024-07-15 | End: 2024-07-15

## 2024-07-25 NOTE — ASSESSMENT & PLAN NOTE
Patient's (Body mass index is 37.61 kg/m².) indicates that they are morbidly/severely obese (BMI > 40 or > 35 with obesity - related health condition) with health conditions that include diabetes mellitus and dyslipidemias . Weight is worsening. BMI  is above average; BMI management plan is completed. We discussed low calorie, low carb based diet program, portion control, increasing exercise, and pharmacologic options including increase wegovy  Diagnosis, treatment and and course discussed. Potential side effects discussed. Return if there is worsening or persistence of symptoms.   .

## 2024-08-06 ENCOUNTER — TELEPHONE (OUTPATIENT)
Dept: FAMILY MEDICINE CLINIC | Facility: CLINIC | Age: 49
End: 2024-08-06
Payer: COMMERCIAL

## 2024-08-06 NOTE — TELEPHONE ENCOUNTER
----- Message from [facility] [employer] sent at 8/6/2024 11:20 AM EDT -----  Regarding: Refill Compound Semaglutide   Contact: [phone]  I need refills sent for my 1 mg Semaglutide to [place]. I need to refill this week and do not currently have any refills for the new dose of 1mg. Thank you!

## 2024-08-14 DIAGNOSIS — M79.7 FIBROMYALGIA: ICD-10-CM

## 2024-08-14 DIAGNOSIS — M51.36 DDD (DEGENERATIVE DISC DISEASE), LUMBAR: ICD-10-CM

## 2024-08-15 RX ORDER — MELOXICAM 15 MG/1
15 TABLET ORAL DAILY
Qty: 30 TABLET | Refills: 3 | Status: SHIPPED | OUTPATIENT
Start: 2024-08-15

## 2024-09-08 DIAGNOSIS — K21.9 GASTROESOPHAGEAL REFLUX DISEASE, UNSPECIFIED WHETHER ESOPHAGITIS PRESENT: ICD-10-CM

## 2024-09-08 DIAGNOSIS — K44.9 HIATAL HERNIA: ICD-10-CM

## 2024-09-09 RX ORDER — OMEPRAZOLE 40 MG/1
40 CAPSULE, DELAYED RELEASE ORAL DAILY
Qty: 30 CAPSULE | Refills: 3 | Status: SHIPPED | OUTPATIENT
Start: 2024-09-09

## 2024-10-14 NOTE — PROGRESS NOTES
Impression: S/P Bilateral Functional Ptosis Correction Upper Eyelids; Bilateral COSMETIC Blepharoplasty Lower Eyelids  - 62 Days. Encounter for other specified surgical aftercare  Z48.89. Plan: Discussed diagnosis with patient. Upper lid ptosis correction & Lower Lid Blepharoplasty healed well. Subjective   Alexus Serrano is a 48 y.o. female. Presents to [facility]    Chief Complaint   Patient presents with    Obesity       Obesity  This is a chronic problem. The current episode started more than 1 month ago. The problem has been gradually improving. Pertinent negatives include no abdominal pain, congestion, coughing, fatigue, headaches, vomiting or weakness. Treatments tried: semaglutide. The treatment provided moderate relief.        I personally reviewed and updated the patient's allergies, medications, problem list, and past medical, surgical, social, and family history. I have reviewed and confirmed the accuracy of the History of Present Illness and Review of Symptoms as documented by the MA/LPN/RN. Mela Casey MD    Allergies:  No Known Allergies    Social History:  Social History     Socioeconomic History    Marital status: Single   Tobacco Use    Smoking status: Former     Current packs/day: 1.00     Average packs/day: 1 pack/day for 3.6 years (3.6 ttl pk-yrs)     Types: Cigarettes     Start date: 2021     Passive exposure: Never    Tobacco comments:     Vapes daily         Vaping Use    Vaping status: Every Day    Substances: Nicotine, Flavoring    Devices: Pre-filled or refillable cartridge   Substance and Sexual Activity    Alcohol use: Not Currently    Drug use: Not Currently     Types: Methamphetamines     Comment: clean for 27 months     Sexual activity: Not Currently       Family History:  Family History   Problem Relation Age of Onset    Cancer Mother 61        lung    Coronary artery disease Mother 48    Heart attack Father 58    Coronary artery disease Father     Colon cancer Maternal Grandmother 62    Breast cancer Paternal Aunt 40         from it    Breast cancer Cousin 51         from it    Ovarian cancer Neg Hx        Past Medical History :  Patient Active Problem List   Diagnosis    Fibromyalgia    DDD (degenerative disc disease), lumbar     Premenstrual dysphoric syndrome    Essential hypertension    Anxiety    Moderate episode of recurrent major depressive disorder    GERD (gastroesophageal reflux disease)    Snoring    Right ovarian cyst    Class 1 obesity due to excess calories with serious comorbidity and body mass index (BMI) of 34.0 to 34.9 in adult    Stress incontinence    Daytime somnolence    Mixed hyperlipidemia    Borderline diabetes    Reactive airway disease with acute exacerbation    Hiatal hernia    Abnormal CXR    Palpitations    Microhematuria    Colon cancer screening       Medication List:    Current Outpatient Medications:     albuterol sulfate  (90 Base) MCG/ACT inhaler, Inhale 2 puffs Every 4 (Four) Hours As Needed for Wheezing., Disp: 8 g, Rfl: 0    atorvastatin (LIPITOR) 10 MG tablet, Take 1 tablet by mouth once daily, Disp: 30 tablet, Rfl: 12    buPROPion XL (WELLBUTRIN XL) 300 MG 24 hr tablet, Take 1 tablet by mouth Daily., Disp: 30 tablet, Rfl: 12    coenzyme Q10 100 MG capsule, Take 1 capsule by mouth Daily., Disp: , Rfl:     estradiol-norethindrone (ACTIVELLA) 1-0.5 MG per tablet, Take 1 tablet by mouth Daily., Disp: , Rfl:     metFORMIN ER (GLUCOPHAGE-XR) 500 MG 24 hr tablet, Take 1 tablet by mouth Daily With Breakfast., Disp: 30 tablet, Rfl: 12    mupirocin (BACTROBAN) 2 % ointment, Apply 1 Application topically to the appropriate area as directed 2 (Two) Times a Day., Disp: 22 g, Rfl: 0    Omega-3 1000 MG capsule, Take  by mouth., Disp: , Rfl:     omeprazole (priLOSEC) 40 MG capsule, Take 1 capsule by mouth once daily, Disp: 30 capsule, Rfl: 3    celecoxib (CeleBREX) 200 MG capsule, Take 1 capsule by mouth Daily., Disp: 30 capsule, Rfl: 12    fluticasone (FLONASE) 50 MCG/ACT nasal spray, 2 sprays into the nostril(s) as directed by provider Daily for 30 days. Administer 2 sprays in each nostril for each dose., Disp: 16 g, Rfl: 0    Past Surgical History:  Past Surgical History:   Procedure Laterality Date     "CHOLECYSTECTOMY      TUBAL ABDOMINAL LIGATION           Physical Exam:      Vital Signs:    Vitals:    10/15/24 1538   BP: 118/76   Pulse: 67   Resp: 18   Temp: 97.7 °F (36.5 °C)   SpO2: 97%        /76 (BP Location: Right arm, Patient Position: Sitting, Cuff Size: Adult)   Pulse 67   Temp 97.7 °F (36.5 °C) (Temporal)   Resp 18   Ht 167.6 cm (66\")   Wt 97 kg (213 lb 12.8 oz)   LMP  (LMP Unknown)   SpO2 97% Comment: ra  BMI 34.51 kg/m²     Wt Readings from Last 3 Encounters:   10/15/24 97 kg (213 lb 12.8 oz)   07/11/24 106 kg (233 lb)   07/10/24 104 kg (230 lb)       Result Review :                Physical Exam  Vitals reviewed.   Constitutional:       Appearance: Normal appearance. She is well-developed.   HENT:      Head: Normocephalic and atraumatic.   Eyes:      General:         Right eye: No discharge.         Left eye: No discharge.   Cardiovascular:      Rate and Rhythm: Normal rate and regular rhythm.      Heart sounds: Normal heart sounds. No murmur heard.     No friction rub. No gallop.   Pulmonary:      Effort: Pulmonary effort is normal. No respiratory distress.      Breath sounds: Normal breath sounds. No wheezing or rales.   Skin:     General: Skin is warm and dry.      Findings: No rash.   Neurological:      Mental Status: She is alert and oriented to person, place, and time.      Coordination: Coordination normal.      Gait: Gait normal.   Psychiatric:         Behavior: Behavior is cooperative.         Assessment and Plan:  Problems Addressed this Visit          Cardiac and Vasculature    Essential hypertension     Hypertension is unchanged.  Continue current treatment regimen.  Dietary sodium restriction.  Weight loss.  Blood pressure will be reassessed in 3 months.         Mixed hyperlipidemia - Primary      She is on atorvastatin  Continue current treatment  Check labs         Relevant Orders    Comprehensive Metabolic Panel    Lipid Panel With / Chol / HDL Ratio       Endocrine and " Metabolic    Class 1 obesity due to excess calories with serious comorbidity and body mass index (BMI) of 34.0 to 34.9 in adult     Patient's (Body mass index is 34.51 kg/m².) indicates that they are obese (BMI >30) with health conditions that include hypertension . Weight is improving with treatment. BMI  is above average; BMI management plan is completed. We discussed low calorie, low carb based diet program, portion control, and increasing exercise.             Musculoskeletal and Injuries    Fibromyalgia     Worse  Change to celebrex    Diagnosis, treatment and and course discussed. Potential side effects discussed. Return if there is worsening or persistence of symptoms.            Relevant Medications    celecoxib (CeleBREX) 200 MG capsule     Diagnoses         Codes Comments    Mixed hyperlipidemia    -  Primary ICD-10-CM: E78.2  ICD-9-CM: 272.2     Essential hypertension     ICD-10-CM: I10  ICD-9-CM: 401.9     Class 1 obesity due to excess calories with serious comorbidity and body mass index (BMI) of 34.0 to 34.9 in adult     ICD-10-CM: E66.811, E66.09, Z68.34  ICD-9-CM: 278.00, V85.34     Fibromyalgia     ICD-10-CM: M79.7  ICD-9-CM: 729.1                          An After Visit Summary and PPPS were given to the patient.       This document is intended for medical expert use only. Reading of this document by patients and/or patient's family without participating medical staff guidance may result in misinterpretation and unintended morbidity. Any interpretation of such data is the responsibility of the patient and/or family member responsible for the patient in concert with their primary or specialist providers, not to be left for sources of online searches such as [employer], [employer] or similar queries. Relying on these approaches to knowledge may result in misinterpretation, misguided goals of care and even death should patients or family members try recommendations outside of the realm of professional medical  care.

## 2024-10-15 ENCOUNTER — OFFICE VISIT (OUTPATIENT)
Dept: FAMILY MEDICINE CLINIC | Facility: CLINIC | Age: 49
End: 2024-10-15
Payer: COMMERCIAL

## 2024-10-15 VITALS
SYSTOLIC BLOOD PRESSURE: 118 MMHG | WEIGHT: 213.8 LBS | RESPIRATION RATE: 18 BRPM | HEIGHT: 66 IN | OXYGEN SATURATION: 97 % | HEART RATE: 67 BPM | BODY MASS INDEX: 34.36 KG/M2 | TEMPERATURE: 97.7 F | DIASTOLIC BLOOD PRESSURE: 76 MMHG

## 2024-10-15 DIAGNOSIS — I10 ESSENTIAL HYPERTENSION: ICD-10-CM

## 2024-10-15 DIAGNOSIS — E66.811 CLASS 1 OBESITY DUE TO EXCESS CALORIES WITH SERIOUS COMORBIDITY AND BODY MASS INDEX (BMI) OF 34.0 TO 34.9 IN ADULT: ICD-10-CM

## 2024-10-15 DIAGNOSIS — E66.09 CLASS 1 OBESITY DUE TO EXCESS CALORIES WITH SERIOUS COMORBIDITY AND BODY MASS INDEX (BMI) OF 34.0 TO 34.9 IN ADULT: ICD-10-CM

## 2024-10-15 DIAGNOSIS — M79.7 FIBROMYALGIA: ICD-10-CM

## 2024-10-15 DIAGNOSIS — E78.2 MIXED HYPERLIPIDEMIA: Primary | ICD-10-CM

## 2024-10-15 PROBLEM — Z12.11 COLON CANCER SCREENING: Status: ACTIVE | Noted: 2024-10-15

## 2024-10-15 PROCEDURE — 99214 OFFICE O/P EST MOD 30 MIN: CPT | Performed by: FAMILY MEDICINE

## 2024-10-15 RX ORDER — CELECOXIB 200 MG/1
200 CAPSULE ORAL DAILY
Qty: 30 CAPSULE | Refills: 12 | Status: SHIPPED | OUTPATIENT
Start: 2024-10-15

## 2024-10-28 NOTE — ASSESSMENT & PLAN NOTE
Worse  Change to celebrex    Diagnosis, treatment and and course discussed. Potential side effects discussed. Return if there is worsening or persistence of symptoms.

## 2024-10-28 NOTE — ASSESSMENT & PLAN NOTE
Patient's (Body mass index is 34.51 kg/m².) indicates that they are obese (BMI >30) with health conditions that include hypertension . Weight is improving with treatment. BMI  is above average; BMI management plan is completed. We discussed low calorie, low carb based diet program, portion control, and increasing exercise.

## 2025-01-01 DIAGNOSIS — K21.9 GASTROESOPHAGEAL REFLUX DISEASE, UNSPECIFIED WHETHER ESOPHAGITIS PRESENT: ICD-10-CM

## 2025-01-01 DIAGNOSIS — K44.9 HIATAL HERNIA: ICD-10-CM

## 2025-01-02 ENCOUNTER — HOSPITAL ENCOUNTER (OUTPATIENT)
Dept: MAMMOGRAPHY | Facility: HOSPITAL | Age: 50
Discharge: HOME | End: 2025-01-02
Admitting: OBSTETRICS & GYNECOLOGY
Payer: COMMERCIAL

## 2025-01-02 DIAGNOSIS — Z12.31 ENCOUNTER FOR SCREENING MAMMOGRAM FOR MALIGNANT NEOPLASM OF BREAST: ICD-10-CM

## 2025-01-02 PROCEDURE — 77063 BREAST TOMOSYNTHESIS BI: CPT

## 2025-01-02 PROCEDURE — 77067 SCR MAMMO BI INCL CAD: CPT

## 2025-01-03 RX ORDER — OMEPRAZOLE 40 MG/1
40 CAPSULE, DELAYED RELEASE ORAL DAILY
Qty: 30 CAPSULE | Refills: 0 | Status: SHIPPED | OUTPATIENT
Start: 2025-01-03

## 2025-01-12 ENCOUNTER — TELEMEDICINE (OUTPATIENT)
Dept: FAMILY MEDICINE CLINIC | Facility: TELEHEALTH | Age: 50
End: 2025-01-12
Payer: COMMERCIAL

## 2025-01-12 DIAGNOSIS — B00.1 HERPES LABIALIS: Primary | ICD-10-CM

## 2025-01-12 RX ORDER — VALACYCLOVIR HYDROCHLORIDE 1 G/1
TABLET, FILM COATED ORAL
Qty: 4 TABLET | Refills: 0 | Status: SHIPPED | OUTPATIENT
Start: 2025-01-12

## 2025-01-12 RX ORDER — DOCOSANOL 100 MG/G
1 CREAM TOPICAL
Qty: 2 G | Refills: 0 | Status: SHIPPED | OUTPATIENT
Start: 2025-01-12 | End: 2025-01-19

## 2025-01-12 NOTE — PROGRESS NOTES
You have chosen to receive care through a telehealth visit.  Do you consent to use a video/audio connection for your medical care today? Yes     HPI  History of Present Illness  The patient is a 49-year-old female who presents via virtual visit for evaluation of a cold sore.    She reports the onset of a painful cold sore last Wednesday morning, which she attributes to stress. This current episode is characterized by significant swelling and severe pain, particularly upon awakening in the morning. Over the past few days, she has experienced low energy levels and increased sleepiness, similar to symptoms of a viral infection. She has been managing the discomfort with intermittent ice application throughout the day. She has been maintaining hydration through increased water intake. She has a history of recurrent cold sores, typically triggered by periods of high stress.    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    ALLERGIES  The patient has no known allergies.    MEDICATIONS  albuterol, Lipitor, Wellbutrin, Celebrex, coenzyme Q10, estradiol, Flonase, metformin, Bactroban ointment, omega-3, Prilosec    Review of Systems    Past Medical History:   Diagnosis Date    Anxiety     Depression     Fibromyalgia     GERD (gastroesophageal reflux disease)     Pelvic pain 10/12/2021    Rash 2022       Family History   Problem Relation Age of Onset    Cancer Mother 61        lung    Coronary artery disease Mother 48    Heart attack Father 58    Coronary artery disease Father     Colon cancer Maternal Grandmother 62    Breast cancer Paternal Aunt 40         from it    Breast cancer Cousin 51         from it    Ovarian cancer Neg Hx        Social History     Socioeconomic History    Marital status: Single   Tobacco Use    Smoking status: Former     Current packs/day: 1.00     Average packs/day: 1 pack/day for 3.8 years (3.8 ttl pk-yrs)     Types: Cigarettes     Start date: 2021     Passive  exposure: Never    Tobacco comments:     Vapes daily         Vaping Use    Vaping status: Every Day    Substances: Nicotine, Flavoring    Devices: Pre-filled or refillable cartridge   Substance and Sexual Activity    Alcohol use: Not Currently    Drug use: Not Currently     Types: Methamphetamines     Comment: clean for 27 months     Sexual activity: Not Currently         There were no vitals taken for this visit.    PHYSICAL EXAM  Physical Exam   Constitutional: She is oriented to person, place, and time. She appears well-developed and well-nourished. She does not have a sickly appearance. She does not appear ill. No distress.   HENT:   Head: Normocephalic and atraumatic.   Right Ear: Hearing normal.   Left Ear: Hearing normal.   Nose: Nose normal.   Mouth/Throat: Oral lesions present.     blistered.  Fever blister noted on lower lip with swelling noted of lip    Pulmonary/Chest: Effort normal.  No respiratory distress.  Neurological: She is alert and oriented to person, place, and time.   Psychiatric: She has a normal mood and affect.   Vitals reviewed.    Physical Exam      Diagnoses and all orders for this visit:    1. Herpes labialis (Primary)  -     valACYclovir (Valtrex) 1000 MG tablet; Take two tabs po every 12 hours for 1 day.  Dispense: 4 tablet; Refill: 0  -     docosanol (ABREVA) 10 % cream cream; Apply 1 Application topically to the appropriate area as directed 5 (Five) Times a Day for 7 days.  Dispense: 2 g; Refill: 0      Assessment & Plan  1. Herpes labialis.  The patient reports a painful cold sore that appeared last Wednesday morning, likely triggered by stress. She has been applying ice intermittently, which is recommended to continue. A prescription for Valtrex 1000 mg, to be taken as 2 tablets in the morning and 2 tablets in the evening for one day, will be sent to [facility] pharmacy in [place], [place]. Additionally, a topical cream will be provided for symptomatic relief. She is advised to  maintain adequate hydration.      FOLLOW-UP  As discussed during visit with [facility], if symptoms worsen or fail to improve, follow-up with PCP/Urgent Care/Emergency Department.    Patient verbalizes understanding of medications, instructions for treatment and follow-up.    Patient or patient representative verbalized consent for the use of Ambient Listening during the visit with  OBDULIA Ascencio for chart documentation. 1/12/2025  08:04 EST    OBDULIA Ascencio  01/12/2025  08:04 EST    The use of a video visit has been reviewed with the patient and verbal informed consent has been obtained. Myself and Alexus Serrano participated in this visit. The patient is located in [facility] [place], and I am located in [place], [place]. [employer] and [employer]  were utilized.

## 2025-01-14 NOTE — PROGRESS NOTES
"  Chief Complaint   Patient presents with    Annual Exam    Hypertension    Hyperlipidemia         Subjective   Alexus Serrano is a 49 y.o. female here for a Annual Visit.     Last Physical Exam: 11/17/22 Previous physical was performed by  Mela Casey MD  General Health:fair  Contraceptive History:Oral  Nutrition:in general, an \"unhealthy\" diet  Exercise Level:regularly 4 times weekly  Sleep:poorly  Hours of Sleep:6  Libido:poor  Self Skin Exam: monthly  Monthly Breast Self Exam:Performs monthly self breast exam    Pap Smear:  Last Completed Pap Smear            PAP SMEAR (Every 3 Years) Next due on 10/18/2026      10/18/2023  SCANNED - PAP SMEAR    10/18/2023  SCANNED - PAP SMEAR                 Findings on last pap: approximate date 10/2024 with Dr. Tolentino and was normal}    Mammogram:   Last Completed Mammogram            MAMMOGRAM (Every 2 Years) Next due on 1/2/2027 01/02/2025  Mammo Screening Digital Tomosynthesis Bilateral With CAD    12/28/2023  Mammo Screening Digital Tomosynthesis Bilateral With CAD    12/08/2022  Mammo Screening Digital Tomosynthesis Bilateral With CAD    12/08/2022  SCANNED - MAMMO    10/18/2021  SCANNED - MAMMO    Only the first 5 history entries have been loaded, but more history exists.                 was done on approximately 01/02/25 and the result was: Birads I (Normal).    Bone Dexa scan: None    Colonoscopy:   Last Completed Colonoscopy       This patient has no relevant Health Maintenance data.          none         I personally reviewed and updated the patient's allergies, medications, problem list, and past medical, surgical, social, and family history.     Allergies:  No Known Allergies    Social History:  Social History     Socioeconomic History    Marital status: Single   Tobacco Use    Smoking status: Former     Current packs/day: 1.00     Average packs/day: 1 pack/day for 3.9 years (3.9 ttl pk-yrs)     Types: Cigarettes     Start date: 8/2/2021     Passive " exposure: Never    Tobacco comments:     Vapes daily         Vaping Use    Vaping status: Every Day    Substances: Nicotine, Flavoring    Devices: Pre-filled or refillable cartridge   Substance and Sexual Activity    Alcohol use: Not Currently    Drug use: Not Currently     Types: Methamphetamines     Comment: clean for 27 months     Sexual activity: Not Currently       Family History:  Family History   Problem Relation Age of Onset    Cancer Mother 61        lung    Coronary artery disease Mother 48    Heart attack Father 58    Coronary artery disease Father     Colon cancer Maternal Grandmother 62    Breast cancer Paternal Aunt 40         from it    Breast cancer Cousin 51         from it    Ovarian cancer Neg Hx        Past Medical History :  Active Ambulatory Problems     Diagnosis Date Noted    Fibromyalgia 2012    DDD (degenerative disc disease), lumbar 2012    Premenstrual dysphoric syndrome 2012    Essential hypertension 2021    Anxiety 2021    Moderate episode of recurrent major depressive disorder 2021    GERD (gastroesophageal reflux disease) 2021    Snoring 2021    Right ovarian cyst 2021    Class 1 obesity due to excess calories with serious comorbidity and body mass index (BMI) of 34.0 to 34.9 in adult 2021    Stress incontinence 2021    Daytime somnolence 2022    Mixed hyperlipidemia 02/10/2023    Borderline diabetes 02/10/2023    Reactive airway disease with acute exacerbation 06/15/2023    Hiatal hernia 2023    Abnormal CXR 2024    Palpitations 2024    Microhematuria 2024    Colon cancer screening 10/15/2024    Recurrent cold sores 2025     Resolved Ambulatory Problems     Diagnosis Date Noted    Acute non-recurrent maxillary sinusitis 2021    Pelvic pain 10/12/2021    Rash 2022    Hyperglycemia 02/10/2023    Viral upper respiratory tract infection 06/15/2023    Acute bacterial  bronchitis 06/15/2023    SOB (shortness of breath) 06/19/2023     Past Medical History:   Diagnosis Date    Depression        Medication List:    Current Outpatient Medications:     albuterol sulfate  (90 Base) MCG/ACT inhaler, Inhale 2 puffs Every 4 (Four) Hours As Needed for Wheezing., Disp: 8 g, Rfl: 0    atorvastatin (LIPITOR) 10 MG tablet, Take 1 tablet by mouth once daily, Disp: 30 tablet, Rfl: 12    buPROPion XL (WELLBUTRIN XL) 300 MG 24 hr tablet, Take 1 tablet by mouth Daily., Disp: 30 tablet, Rfl: 12    celecoxib (CeleBREX) 200 MG capsule, Take 1 capsule by mouth Daily., Disp: 30 capsule, Rfl: 12    coenzyme Q10 100 MG capsule, Take 1 capsule by mouth Daily., Disp: , Rfl:     estradiol-norethindrone (ACTIVELLA) 1-0.5 MG per tablet, Take 1 tablet by mouth Daily., Disp: , Rfl:     metFORMIN ER (GLUCOPHAGE-XR) 500 MG 24 hr tablet, Take 1 tablet by mouth Daily With Breakfast., Disp: 30 tablet, Rfl: 12    mupirocin (BACTROBAN) 2 % ointment, Apply 1 Application topically to the appropriate area as directed 2 (Two) Times a Day., Disp: 22 g, Rfl: 0    Omega-3 1000 MG capsule, Take  by mouth., Disp: , Rfl:     omeprazole (priLOSEC) 40 MG capsule, Take 1 capsule by mouth once daily, Disp: 30 capsule, Rfl: 0    Semaglutide, 1 MG/DOSE, (Ozempic, 1 MG/DOSE,) 2 MG/1.5ML solution pen-injector, Inject 1 mg under the skin into the appropriate area as directed 1 (One) Time Per Week., Disp: 3 mL, Rfl: 4    acyclovir (ZOVIRAX) 5 % ointment, Apply 1 Application topically to the appropriate area as directed 5 (Five) Times a Day. Begin treatment at earliest sign or symptom., Disp: 2 each, Rfl: 12    fluticasone (FLONASE) 50 MCG/ACT nasal spray, 2 sprays into the nostril(s) as directed by provider Daily for 30 days. Administer 2 sprays in each nostril for each dose., Disp: 16 g, Rfl: 0    valACYclovir (VALTREX) 1000 MG tablet, Take 1 tablet by mouth Daily., Disp: 30 tablet, Rfl: 12    Past Surgical History:  Past Surgical  "History:   Procedure Laterality Date    CHOLECYSTECTOMY      TUBAL ABDOMINAL LIGATION         Depression Screen:       1/17/2025     4:27 PM   PHQ-2/PHQ-9 Depression Screening   Little interest or pleasure in doing things Not at all   Feeling down, depressed, or hopeless Not at all   How difficult have these problems made it for you to do your work, take care of things at home, or get along with other people? Not difficult at all       Fall Risk Screen:  EYAD Fall Risk Assessment has not been completed.        Physical Exam:  Vital Signs:  Visit Vitals  /78 (BP Location: Right arm, Patient Position: Sitting, Cuff Size: Adult)   Pulse 104   Temp 97.3 °F (36.3 °C) (Temporal)   Resp 19   Ht 167.6 cm (66\")   Wt 97.5 kg (215 lb)   LMP 01/04/2025   SpO2 98% Comment: ra   BMI 34.70 kg/m²       Body mass index is 34.7 kg/m².      Result Review :                  Assessment and Plan:  Problem List Items Addressed This Visit          Cardiac and Vasculature    Essential hypertension    Current Assessment & Plan     Hypertension is unchanged.  Continue current treatment regimen.  Dietary sodium restriction.  Weight loss.  Blood pressure will be reassessed in 3 months.         Relevant Orders    CBC & Differential    Comprehensive Metabolic Panel    TSH    Mixed hyperlipidemia    Current Assessment & Plan       She is on atorvastatin  Continue current treatment  Check labs         Relevant Orders    Comprehensive Metabolic Panel    Lipid Panel With / Chol / HDL Ratio       Endocrine and Metabolic    Class 1 obesity due to excess calories with serious comorbidity and body mass index (BMI) of 34.0 to 34.9 in adult    Current Assessment & Plan     Patient's (Body mass index is 34.7 kg/m².) indicates that they are obese (BMI >30) with health conditions that include hypertension and dyslipidemias . Weight is worsening. BMI  is above average; BMI management plan is completed. We discussed low calorie, low carb based diet " program, portion control, and increasing exercise.          Relevant Medications    Semaglutide, 1 MG/DOSE, (Ozempic, 1 MG/DOSE,) 2 MG/1.5ML solution pen-injector    Borderline diabetes    Relevant Orders    Hemoglobin A1c       Infectious Diseases    Recurrent cold sores    Current Assessment & Plan     Refill acyclovir         Relevant Medications    valACYclovir (VALTREX) 1000 MG tablet    acyclovir (ZOVIRAX) 5 % ointment     Other Visit Diagnoses       Encounter for general adult medical examination with abnormal findings    -  Primary    Relevant Orders    POCT urinalysis dipstick, multipro (Completed)    Class 2 severe obesity due to excess calories with serious comorbidity and body mass index (BMI) of 37.0 to 37.9 in adult        Relevant Medications    Semaglutide, 1 MG/DOSE, (Ozempic, 1 MG/DOSE,) 2 MG/1.5ML solution pen-injector                    An After Visit Summary and PPPS were given to the patient.       Discussed injury prevention, diet and exercise, safe sexual practices, and screening for common diseases. Encouraged use of sunscreen and seatbelts. Discussed timing of  cervical cancer screening. Encouraged monthly self-breast exams, yearly clinical breast exams, and discussed timing of mammograms. Avoidance of tobacco encouraged. Limitation or avoidance of alcohol encouraged. Recommend yearly dental and eye exams. Also discussed monitoring of blood pressure, lipids.         +++++E/M portion medically necessary secondary to new or uncontrolled chronic problem+++++++    Subjective   Alexus Serrano is here for:    Chief Complaint   Patient presents with    Annual Exam    Hypertension    Hyperlipidemia       Hypertension  This is a chronic problem. The current episode started more than 1 year ago. The problem is unchanged. The problem is controlled. Pertinent negatives include no chest pain, headaches, malaise/fatigue or shortness of breath. There are no associated agents to hypertension. Risk  factors for coronary artery disease include diabetes mellitus, dyslipidemia, obesity and stress. Past treatments include nothing. Current antihypertension treatment includes nothing. The current treatment provides mild improvement.   Hyperlipidemia  This is a chronic problem. The current episode started more than 1 year ago. The problem is controlled. Exacerbating diseases include diabetes and obesity. She has no history of hypothyroidism. Pertinent negatives include no chest pain or shortness of breath. Current antihyperlipidemic treatment includes statins. The current treatment provides mild improvement of lipids. Risk factors for coronary artery disease include hypertension, dyslipidemia, diabetes mellitus, obesity and stress.         Physical Exam:  Review of Systems   Constitutional:  Negative for malaise/fatigue.   Respiratory:  Negative for shortness of breath.    Cardiovascular:  Negative for chest pain.        Physical Exam  Vitals and nursing note reviewed.   Constitutional:       General: She is not in acute distress.     Appearance: She is well-developed. She is not diaphoretic.   HENT:      Head: Normocephalic and atraumatic.      Right Ear: Tympanic membrane and external ear normal.      Left Ear: Tympanic membrane and external ear normal.      Nose: Nose normal.      Mouth/Throat:      Pharynx: No oropharyngeal exudate.   Eyes:      General: No scleral icterus.        Right eye: No discharge.         Left eye: No discharge.      Conjunctiva/sclera: Conjunctivae normal.      Pupils: Pupils are equal, round, and reactive to light.   Neck:      Thyroid: No thyromegaly.      Trachea: No tracheal deviation.   Cardiovascular:      Rate and Rhythm: Normal rate and regular rhythm.      Heart sounds: Normal heart sounds. No murmur heard.     No friction rub. No gallop.   Pulmonary:      Effort: Pulmonary effort is normal. No respiratory distress.      Breath sounds: Normal breath sounds. No stridor. No  wheezing or rales.   Abdominal:      General: Bowel sounds are normal. There is no distension.      Palpations: Abdomen is soft. There is no mass.      Tenderness: There is no abdominal tenderness. There is no guarding or rebound.   Musculoskeletal:         General: No tenderness or deformity. Normal range of motion.      Cervical back: Normal range of motion and neck supple.   Lymphadenopathy:      Cervical: No cervical adenopathy.   Skin:     General: Skin is warm and dry.      Capillary Refill: Capillary refill takes less than 2 seconds.      Coloration: Skin is not pale.      Findings: No erythema or rash.   Neurological:      Mental Status: She is alert and oriented to person, place, and time.      Cranial Nerves: No cranial nerve deficit.      Sensory: No sensory deficit.      Motor: No tremor, atrophy or abnormal muscle tone.      Coordination: Coordination normal.      Gait: Gait normal.      Deep Tendon Reflexes: Reflexes are normal and symmetric. Reflexes normal.   Psychiatric:         Behavior: Behavior normal.         Thought Content: Thought content normal.         Cognition and Memory: Memory is not impaired. She does not exhibit impaired recent memory or impaired remote memory.         Judgment: Judgment normal.         Assessment and Plan:  Problem List Items Addressed This Visit          Cardiac and Vasculature    Essential hypertension    Current Assessment & Plan     Hypertension is unchanged.  Continue current treatment regimen.  Dietary sodium restriction.  Weight loss.  Blood pressure will be reassessed in 3 months.         Relevant Orders    CBC & Differential    Comprehensive Metabolic Panel    TSH    Mixed hyperlipidemia    Current Assessment & Plan       She is on atorvastatin  Continue current treatment  Check labs         Relevant Orders    Comprehensive Metabolic Panel    Lipid Panel With / Chol / HDL Ratio       Endocrine and Metabolic    Class 1 obesity due to excess calories with  serious comorbidity and body mass index (BMI) of 34.0 to 34.9 in adult    Current Assessment & Plan     Patient's (Body mass index is 34.7 kg/m².) indicates that they are obese (BMI >30) with health conditions that include hypertension and dyslipidemias . Weight is worsening. BMI  is above average; BMI management plan is completed. We discussed low calorie, low carb based diet program, portion control, and increasing exercise.          Relevant Medications    Semaglutide, 1 MG/DOSE, (Ozempic, 1 MG/DOSE,) 2 MG/1.5ML solution pen-injector    Borderline diabetes    Relevant Orders    Hemoglobin A1c       Infectious Diseases    Recurrent cold sores    Current Assessment & Plan     Refill acyclovir         Relevant Medications    valACYclovir (VALTREX) 1000 MG tablet    acyclovir (ZOVIRAX) 5 % ointment     Other Visit Diagnoses       Encounter for general adult medical examination with abnormal findings    -  Primary    Relevant Orders    POCT urinalysis dipstick, multipro (Completed)    Class 2 severe obesity due to excess calories with serious comorbidity and body mass index (BMI) of 37.0 to 37.9 in adult        Relevant Medications    Semaglutide, 1 MG/DOSE, (Ozempic, 1 MG/DOSE,) 2 MG/1.5ML solution pen-injector                      An After Visit Summary and PPPS were given to the patient.       Discussed injury prevention, diet and exercise, safe sexual practices, and screening for common diseases. Encouraged use of sunscreen and seatbelts. Discussed timing of  cervical cancer screening. Encouraged monthly self-breast exams, yearly clinical breast exams, and discussed timing of mammograms. Avoidance of tobacco encouraged. Limitation or avoidance of alcohol encouraged. Recommend yearly dental and eye exams. Also discussed monitoring of blood pressure, lipids.

## 2025-01-17 ENCOUNTER — OFFICE VISIT (OUTPATIENT)
Dept: FAMILY MEDICINE CLINIC | Facility: CLINIC | Age: 50
End: 2025-01-17
Payer: COMMERCIAL

## 2025-01-17 VITALS
WEIGHT: 215 LBS | OXYGEN SATURATION: 98 % | BODY MASS INDEX: 34.55 KG/M2 | RESPIRATION RATE: 19 BRPM | DIASTOLIC BLOOD PRESSURE: 78 MMHG | SYSTOLIC BLOOD PRESSURE: 122 MMHG | HEART RATE: 104 BPM | HEIGHT: 66 IN | TEMPERATURE: 97.3 F

## 2025-01-17 DIAGNOSIS — E66.811 CLASS 1 OBESITY DUE TO EXCESS CALORIES WITH SERIOUS COMORBIDITY AND BODY MASS INDEX (BMI) OF 34.0 TO 34.9 IN ADULT: ICD-10-CM

## 2025-01-17 DIAGNOSIS — R73.03 BORDERLINE DIABETES: ICD-10-CM

## 2025-01-17 DIAGNOSIS — E66.09 CLASS 1 OBESITY DUE TO EXCESS CALORIES WITH SERIOUS COMORBIDITY AND BODY MASS INDEX (BMI) OF 34.0 TO 34.9 IN ADULT: ICD-10-CM

## 2025-01-17 DIAGNOSIS — E78.2 MIXED HYPERLIPIDEMIA: ICD-10-CM

## 2025-01-17 DIAGNOSIS — Z00.01 ENCOUNTER FOR GENERAL ADULT MEDICAL EXAMINATION WITH ABNORMAL FINDINGS: Primary | ICD-10-CM

## 2025-01-17 DIAGNOSIS — I10 ESSENTIAL HYPERTENSION: ICD-10-CM

## 2025-01-17 DIAGNOSIS — E66.812 CLASS 2 SEVERE OBESITY DUE TO EXCESS CALORIES WITH SERIOUS COMORBIDITY AND BODY MASS INDEX (BMI) OF 37.0 TO 37.9 IN ADULT: ICD-10-CM

## 2025-01-17 DIAGNOSIS — E66.01 CLASS 2 SEVERE OBESITY DUE TO EXCESS CALORIES WITH SERIOUS COMORBIDITY AND BODY MASS INDEX (BMI) OF 37.0 TO 37.9 IN ADULT: ICD-10-CM

## 2025-01-17 DIAGNOSIS — B00.1 RECURRENT COLD SORES: ICD-10-CM

## 2025-01-17 LAB
BILIRUB BLD-MCNC: NEGATIVE MG/DL
CLARITY, POC: CLEAR
COLOR UR: YELLOW
GLUCOSE UR STRIP-MCNC: NEGATIVE MG/DL
KETONES UR QL: NEGATIVE
LEUKOCYTE EST, POC: NEGATIVE
NITRITE UR-MCNC: NEGATIVE MG/ML
PH UR: 5 [PH] (ref 5–8)
PROT UR STRIP-MCNC: NEGATIVE MG/DL
RBC # UR STRIP: NEGATIVE /UL
SP GR UR: 1.01 (ref 1–1.03)
UROBILINOGEN UR QL: NORMAL

## 2025-01-17 PROCEDURE — 99214 OFFICE O/P EST MOD 30 MIN: CPT | Performed by: FAMILY MEDICINE

## 2025-01-17 PROCEDURE — 81003 URINALYSIS AUTO W/O SCOPE: CPT | Performed by: FAMILY MEDICINE

## 2025-01-17 PROCEDURE — 99396 PREV VISIT EST AGE 40-64: CPT | Performed by: FAMILY MEDICINE

## 2025-01-17 RX ORDER — SEMAGLUTIDE 1.34 MG/ML
1 INJECTION, SOLUTION SUBCUTANEOUS WEEKLY
Qty: 3 ML | Refills: 4 | Status: SHIPPED | OUTPATIENT
Start: 2025-01-17

## 2025-01-17 RX ORDER — VALACYCLOVIR HYDROCHLORIDE 1 G/1
1000 TABLET, FILM COATED ORAL DAILY
Qty: 30 TABLET | Refills: 12 | Status: SHIPPED | OUTPATIENT
Start: 2025-01-17

## 2025-01-17 RX ORDER — ACYCLOVIR 50 MG/G
1 OINTMENT TOPICAL
Qty: 2 EACH | Refills: 12 | Status: SHIPPED | OUTPATIENT
Start: 2025-01-17

## 2025-01-29 NOTE — ASSESSMENT & PLAN NOTE
Patient's (Body mass index is 34.7 kg/m².) indicates that they are obese (BMI >30) with health conditions that include hypertension and dyslipidemias . Weight is worsening. BMI  is above average; BMI management plan is completed. We discussed low calorie, low carb based diet program, portion control, and increasing exercise.

## 2025-02-02 DIAGNOSIS — K21.9 GASTROESOPHAGEAL REFLUX DISEASE, UNSPECIFIED WHETHER ESOPHAGITIS PRESENT: ICD-10-CM

## 2025-02-02 DIAGNOSIS — K44.9 HIATAL HERNIA: ICD-10-CM

## 2025-02-03 RX ORDER — OMEPRAZOLE 40 MG/1
40 CAPSULE, DELAYED RELEASE ORAL DAILY
Qty: 30 CAPSULE | Refills: 3 | Status: SHIPPED | OUTPATIENT
Start: 2025-02-03

## 2025-02-07 DIAGNOSIS — F33.1 MODERATE EPISODE OF RECURRENT MAJOR DEPRESSIVE DISORDER: ICD-10-CM

## 2025-02-07 DIAGNOSIS — F41.9 ANXIETY: ICD-10-CM

## 2025-02-07 LAB
ALBUMIN SERPL-MCNC: 4.6 G/DL (ref 3.9–4.9)
ALP SERPL-CCNC: 95 IU/L (ref 44–121)
ALT SERPL-CCNC: 25 IU/L (ref 0–32)
AST SERPL-CCNC: 19 IU/L (ref 0–40)
BASOPHILS # BLD AUTO: 0.1 X10E3/UL (ref 0–0.2)
BASOPHILS NFR BLD AUTO: 1 %
BILIRUB SERPL-MCNC: 0.3 MG/DL (ref 0–1.2)
BUN SERPL-MCNC: 17 MG/DL (ref 6–24)
BUN/CREAT SERPL: 20 (ref 9–23)
CALCIUM SERPL-MCNC: 10.3 MG/DL (ref 8.7–10.2)
CHLORIDE SERPL-SCNC: 101 MMOL/L (ref 96–106)
CHOLEST SERPL-MCNC: 161 MG/DL (ref 100–199)
CHOLEST/HDLC SERPL: 2.8 RATIO (ref 0–4.4)
CO2 SERPL-SCNC: 22 MMOL/L (ref 20–29)
CREAT SERPL-MCNC: 0.83 MG/DL (ref 0.57–1)
EGFRCR SERPLBLD CKD-EPI 2021: 86 ML/MIN/1.73
EOSINOPHIL # BLD AUTO: 0.3 X10E3/UL (ref 0–0.4)
EOSINOPHIL NFR BLD AUTO: 2 %
ERYTHROCYTE [DISTWIDTH] IN BLOOD BY AUTOMATED COUNT: 14.6 % (ref 11.7–15.4)
GLOBULIN SER CALC-MCNC: 2.9 G/DL (ref 1.5–4.5)
GLUCOSE SERPL-MCNC: 97 MG/DL (ref 70–99)
HBA1C MFR BLD: 5.8 % (ref 4.8–5.6)
HCT VFR BLD AUTO: 41.8 % (ref 34–46.6)
HDLC SERPL-MCNC: 57 MG/DL
HGB BLD-MCNC: 13.1 G/DL (ref 11.1–15.9)
IMM GRANULOCYTES # BLD AUTO: 0 X10E3/UL (ref 0–0.1)
IMM GRANULOCYTES NFR BLD AUTO: 0 %
LDLC SERPL CALC-MCNC: 87 MG/DL (ref 0–99)
LYMPHOCYTES # BLD AUTO: 2.8 X10E3/UL (ref 0.7–3.1)
LYMPHOCYTES NFR BLD AUTO: 25 %
MCH RBC QN AUTO: 26.7 PG (ref 26.6–33)
MCHC RBC AUTO-ENTMCNC: 31.3 G/DL (ref 31.5–35.7)
MCV RBC AUTO: 85 FL (ref 79–97)
MONOCYTES # BLD AUTO: 0.6 X10E3/UL (ref 0.1–0.9)
MONOCYTES NFR BLD AUTO: 6 %
NEUTROPHILS # BLD AUTO: 7.3 X10E3/UL (ref 1.4–7)
NEUTROPHILS NFR BLD AUTO: 66 %
PLATELET # BLD AUTO: 365 X10E3/UL (ref 150–450)
POTASSIUM SERPL-SCNC: 5.4 MMOL/L (ref 3.5–5.2)
PROT SERPL-MCNC: 7.5 G/DL (ref 6–8.5)
RBC # BLD AUTO: 4.9 X10E6/UL (ref 3.77–5.28)
SODIUM SERPL-SCNC: 138 MMOL/L (ref 134–144)
TRIGL SERPL-MCNC: 93 MG/DL (ref 0–149)
TSH SERPL DL<=0.005 MIU/L-ACNC: 0.75 UIU/ML (ref 0.45–4.5)
VLDLC SERPL CALC-MCNC: 17 MG/DL (ref 5–40)
WBC # BLD AUTO: 11.1 X10E3/UL (ref 3.4–10.8)

## 2025-02-07 RX ORDER — BUPROPION HYDROCHLORIDE 300 MG/1
300 TABLET ORAL DAILY
Qty: 30 TABLET | Refills: 12 | Status: SHIPPED | OUTPATIENT
Start: 2025-02-07

## 2025-02-10 ENCOUNTER — TELEPHONE (OUTPATIENT)
Dept: FAMILY MEDICINE CLINIC | Facility: CLINIC | Age: 50
End: 2025-02-10
Payer: COMMERCIAL

## 2025-02-10 DIAGNOSIS — E87.5 SERUM POTASSIUM ELEVATED: Primary | ICD-10-CM

## 2025-02-11 LAB
BUN SERPL-MCNC: 19 MG/DL (ref 6–24)
BUN/CREAT SERPL: 24 (ref 9–23)
CALCIUM SERPL-MCNC: 10.6 MG/DL (ref 8.7–10.2)
CHLORIDE SERPL-SCNC: 101 MMOL/L (ref 96–106)
CO2 SERPL-SCNC: 22 MMOL/L (ref 20–29)
CREAT SERPL-MCNC: 0.8 MG/DL (ref 0.57–1)
EGFRCR SERPLBLD CKD-EPI 2021: 90 ML/MIN/1.73
GLUCOSE SERPL-MCNC: 95 MG/DL (ref 70–99)
POTASSIUM SERPL-SCNC: 5.2 MMOL/L (ref 3.5–5.2)
SODIUM SERPL-SCNC: 140 MMOL/L (ref 134–144)

## 2025-04-15 NOTE — PROGRESS NOTES
Subjective   Alexus Serrano is a 49 y.o. female. Presents to [facility]    Chief Complaint   Patient presents with    Hypertension    Hyperlipidemia    Blood Sugar Problem    Insomnia       Hypertension  This is a chronic problem. The current episode started more than 1 year ago. The problem is controlled. Pertinent negatives include no chest pain, headaches, malaise/fatigue, palpitations or shortness of breath. Risk factors for coronary artery disease include dyslipidemia and obesity. Current antihypertension treatment includes nothing.   Hyperlipidemia  This is a chronic problem. The current episode started more than 1 year ago. Exacerbating diseases include diabetes and obesity. Pertinent negatives include no chest pain or shortness of breath. Current antihyperlipidemic treatment includes herbal therapy. The current treatment provides moderate improvement of lipids. Risk factors for coronary artery disease include dyslipidemia, hypertension and obesity.   Blood Sugar Problem  Symptoms are chronic.   Onset was 1 to 5 years.   Pertinent negative symptoms include no chest pain and no headaches.   Symptoms comment: wakes up every few hours.   Treatments tried include nothing.   Insomnia  Symptoms are chronic.   Onset was 1 to 6 months.   Symptoms occur daily.   Symptoms have been worse since onset.   Pertinent negative symptoms include no chest pain and no headaches.   Symptoms comment: wakes up every few hours.   Treatments tried include nothing.        I personally reviewed and updated the patient's allergies, medications, problem list, and past medical, surgical, social, and family history. I have reviewed and confirmed the accuracy of the History of Present Illness and Review of Symptoms as documented by the MA/LPN/RN. Mela Casey MD    Allergies:  No Known Allergies    Social History:  Social History     Socioeconomic History    Marital status: Single   Tobacco Use    Smoking status:  Former     Current packs/day: 1.00     Average packs/day: 1 pack/day for 4.1 years (4.1 ttl pk-yrs)     Types: Cigarettes     Start date: 2021     Passive exposure: Never    Tobacco comments:     Vapes daily         Vaping Use    Vaping status: Every Day    Substances: Nicotine, Flavoring    Devices: Pre-filled or refillable cartridge   Substance and Sexual Activity    Alcohol use: Not Currently    Drug use: Not Currently     Types: Methamphetamines     Comment: clean for 27 months     Sexual activity: Not Currently       Family History:  Family History   Problem Relation Age of Onset    Cancer Mother 61        lung    Coronary artery disease Mother 48    Heart attack Father 58    Coronary artery disease Father     Colon cancer Maternal Grandmother 62    Breast cancer Paternal Aunt 40         from it    Breast cancer Cousin 51         from it    Ovarian cancer Neg Hx        Past Medical History :  Patient Active Problem List   Diagnosis    Fibromyalgia    DDD (degenerative disc disease), lumbar    Premenstrual dysphoric syndrome    Essential hypertension    Anxiety    Moderate episode of recurrent major depressive disorder    GERD (gastroesophageal reflux disease)    Snoring    Right ovarian cyst    Class 2 severe obesity due to excess calories with serious comorbidity and body mass index (BMI) of 36.0 to 36.9 in adult    Stress incontinence    Daytime somnolence    Mixed hyperlipidemia    Borderline diabetes    Reactive airway disease with acute exacerbation    Hiatal hernia    Abnormal CXR    Palpitations    Microhematuria    Colon cancer screening    Recurrent cold sores    Polyarthralgia       Medication List:    Current Outpatient Medications:     acyclovir (ZOVIRAX) 5 % ointment, Apply 1 Application topically to the appropriate area as directed 5 (Five) Times a Day. Begin treatment at earliest sign or symptom., Disp: 2 each, Rfl: 12    albuterol sulfate  (90 Base) MCG/ACT inhaler, Inhale 2  "puffs Every 4 (Four) Hours As Needed for Wheezing., Disp: 8 g, Rfl: 0    celecoxib (CeleBREX) 200 MG capsule, Take 1 capsule by mouth 2 (Two) Times a Day., Disp: 60 capsule, Rfl: 12    coenzyme Q10 100 MG capsule, Take 1 capsule by mouth Daily., Disp: , Rfl:     estradiol-norethindrone (ACTIVELLA) 1-0.5 MG per tablet, Take 1 tablet by mouth Daily., Disp: , Rfl:     metFORMIN ER (GLUCOPHAGE-XR) 500 MG 24 hr tablet, Take 1 tablet by mouth Daily With Breakfast., Disp: 30 tablet, Rfl: 12    Omega-3 1000 MG capsule, Take  by mouth., Disp: , Rfl:     omeprazole (priLOSEC) 40 MG capsule, Take 1 capsule by mouth once daily, Disp: 30 capsule, Rfl: 3    atorvastatin (LIPITOR) 10 MG tablet, Take 1 tablet by mouth once daily, Disp: 90 tablet, Rfl: 3    buPROPion XL (WELLBUTRIN XL) 300 MG 24 hr tablet, Take 1 tablet by mouth Daily., Disp: 30 tablet, Rfl: 12    fluticasone (FLONASE) 50 MCG/ACT nasal spray, 2 sprays into the nostril(s) as directed by provider Daily for 30 days. Administer 2 sprays in each nostril for each dose., Disp: 16 g, Rfl: 0    pregabalin (Lyrica) 50 MG capsule, Take 1 capsule by mouth 2 (Two) Times a Day., Disp: 60 capsule, Rfl: 1    Past Surgical History:  Past Surgical History:   Procedure Laterality Date    CHOLECYSTECTOMY      TUBAL ABDOMINAL LIGATION           Physical Exam:      Vital Signs:    Vitals:    04/18/25 1631   BP: 122/84   Pulse: 109   Resp: 18   Temp: 97.7 °F (36.5 °C)   SpO2: 98%        /84 (BP Location: Right arm, Patient Position: Sitting, Cuff Size: Adult)   Pulse 109   Temp 97.7 °F (36.5 °C) (Temporal)   Resp 18   Ht 167.6 cm (66\")   Wt 101 kg (222 lb 12.8 oz)   LMP  (LMP Unknown)   SpO2 98% Comment: ra  BMI 35.96 kg/m²     Wt Readings from Last 3 Encounters:   04/18/25 101 kg (222 lb 12.8 oz)   01/17/25 97.5 kg (215 lb)   10/15/24 97 kg (213 lb 12.8 oz)       Result Review :                Physical Exam  Vitals reviewed.   Constitutional:       Appearance: Normal " appearance. She is well-developed.   HENT:      Head: Normocephalic and atraumatic.   Eyes:      General:         Right eye: No discharge.         Left eye: No discharge.   Cardiovascular:      Rate and Rhythm: Normal rate and regular rhythm.      Heart sounds: Normal heart sounds. No murmur heard.     No friction rub. No gallop.   Pulmonary:      Effort: Pulmonary effort is normal. No respiratory distress.      Breath sounds: Normal breath sounds. No wheezing or rales.   Skin:     General: Skin is warm and dry.      Findings: No rash.   Neurological:      Mental Status: She is alert and oriented to person, place, and time.      Coordination: Coordination normal.      Gait: Gait normal.   Psychiatric:         Behavior: Behavior is cooperative.         Assessment and Plan:  Problems Addressed this Visit          Cardiac and Vasculature    Essential hypertension    Hypertension is unchanged.  Continue current treatment regimen.  Dietary sodium restriction.  Weight loss.  Blood pressure will be reassessed in 3 months.         Relevant Orders    CBC & Differential    TSH    Mixed hyperlipidemia      She is on atorvastatin  Continue current treatment  Check labs         Relevant Orders    Comprehensive Metabolic Panel    Lipid Panel With / Chol / HDL Ratio       Endocrine and Metabolic    Class 2 severe obesity due to excess calories with serious comorbidity and body mass index (BMI) of 36.0 to 36.9 in adult    Patient's (Body mass index is 35.96 kg/m².) indicates that they are morbidly/severely obese (BMI > 40 or > 35 with obesity - related health condition) with health conditions that include diabetes mellitus and dyslipidemias . Weight is worsening. BMI  is above average; BMI management plan is completed. We discussed low calorie, low carb based diet program, portion control, and increasing exercise.          Borderline diabetes - Primary    Continue with metformin    She is working on diet and exercise          Relevant Medications    metFORMIN ER (GLUCOPHAGE-XR) 500 MG 24 hr tablet       Mental Health    Anxiety    Moderate episode of recurrent major depressive disorder    Patient's depression is a recurrent episode that is moderate without psychosis. Depression is active and worsening.    Plan:   Begin new antidepressant medicine; cymbalta    Followup in 4 weeks.             Musculoskeletal and Injuries    Fibromyalgia    Relevant Medications    celecoxib (CeleBREX) 200 MG capsule    Polyarthralgia    Worsening her fibromylagia?  Will check rheumatoid labs         Relevant Orders    C-reactive Protein    BRAD by IFA, Reflex to Titer and Pattern (Completed)    Rheumatoid Factor    Uric Acid       Neuro    Daytime somnolence    Relevant Orders    Ambulatory Referral to Sleep Medicine     Other Visit Diagnoses         Insomnia, unspecified type        Relevant Orders    Ambulatory Referral to Sleep Medicine          Diagnoses         Codes Comments      Borderline diabetes    -  Primary ICD-10-CM: R73.03  ICD-9-CM: 790.29       Essential hypertension     ICD-10-CM: I10  ICD-9-CM: 401.9       Mixed hyperlipidemia     ICD-10-CM: E78.2  ICD-9-CM: 272.2       Class 2 severe obesity due to excess calories with serious comorbidity and body mass index (BMI) of 36.0 to 36.9 in adult     ICD-10-CM: E66.812, E66.01, Z68.36  ICD-9-CM: 278.01, V85.36       Insomnia, unspecified type     ICD-10-CM: G47.00  ICD-9-CM: 780.52       Daytime somnolence     ICD-10-CM: R40.0  ICD-9-CM: 780.54       Fibromyalgia     ICD-10-CM: M79.7  ICD-9-CM: 729.1       Moderate episode of recurrent major depressive disorder     ICD-10-CM: F33.1  ICD-9-CM: 296.32       Anxiety     ICD-10-CM: F41.9  ICD-9-CM: 300.00       Polyarthralgia     ICD-10-CM: M25.50  ICD-9-CM: 719.49                          An After Visit Summary and PPPS were given to the patient.       This document is intended for medical expert use only. Reading of this document by patients and/or  patient's family without participating medical staff guidance may result in misinterpretation and unintended morbidity. Any interpretation of such data is the responsibility of the patient and/or family member responsible for the patient in concert with their primary or specialist providers, not to be left for sources of online searches such as [employer] or similar queries. Relying on these approaches to knowledge may result in misinterpretation, misguided goals of care and even death should patients or family members try recommendations outside of the realm of professional medical care.

## 2025-04-18 ENCOUNTER — OFFICE VISIT (OUTPATIENT)
Dept: FAMILY MEDICINE CLINIC | Facility: CLINIC | Age: 50
End: 2025-04-18
Payer: COMMERCIAL

## 2025-04-18 VITALS
WEIGHT: 222.8 LBS | HEART RATE: 109 BPM | OXYGEN SATURATION: 98 % | RESPIRATION RATE: 18 BRPM | TEMPERATURE: 97.7 F | HEIGHT: 66 IN | SYSTOLIC BLOOD PRESSURE: 122 MMHG | DIASTOLIC BLOOD PRESSURE: 84 MMHG | BODY MASS INDEX: 35.81 KG/M2

## 2025-04-18 DIAGNOSIS — R40.0 DAYTIME SOMNOLENCE: ICD-10-CM

## 2025-04-18 DIAGNOSIS — G47.00 INSOMNIA, UNSPECIFIED TYPE: ICD-10-CM

## 2025-04-18 DIAGNOSIS — E78.2 MIXED HYPERLIPIDEMIA: ICD-10-CM

## 2025-04-18 DIAGNOSIS — E66.01 CLASS 2 SEVERE OBESITY DUE TO EXCESS CALORIES WITH SERIOUS COMORBIDITY AND BODY MASS INDEX (BMI) OF 36.0 TO 36.9 IN ADULT: ICD-10-CM

## 2025-04-18 DIAGNOSIS — M25.50 POLYARTHRALGIA: ICD-10-CM

## 2025-04-18 DIAGNOSIS — E66.812 CLASS 2 SEVERE OBESITY DUE TO EXCESS CALORIES WITH SERIOUS COMORBIDITY AND BODY MASS INDEX (BMI) OF 36.0 TO 36.9 IN ADULT: ICD-10-CM

## 2025-04-18 DIAGNOSIS — M79.7 FIBROMYALGIA: ICD-10-CM

## 2025-04-18 DIAGNOSIS — F33.1 MODERATE EPISODE OF RECURRENT MAJOR DEPRESSIVE DISORDER: ICD-10-CM

## 2025-04-18 DIAGNOSIS — F41.9 ANXIETY: ICD-10-CM

## 2025-04-18 DIAGNOSIS — R73.03 BORDERLINE DIABETES: Primary | ICD-10-CM

## 2025-04-18 DIAGNOSIS — I10 ESSENTIAL HYPERTENSION: ICD-10-CM

## 2025-04-18 RX ORDER — CELECOXIB 200 MG/1
200 CAPSULE ORAL 2 TIMES DAILY
Qty: 60 CAPSULE | Refills: 12 | Status: SHIPPED | OUTPATIENT
Start: 2025-04-18

## 2025-04-18 RX ORDER — METFORMIN HYDROCHLORIDE 500 MG/1
500 TABLET, EXTENDED RELEASE ORAL
Qty: 30 TABLET | Refills: 12 | Status: SHIPPED | OUTPATIENT
Start: 2025-04-18

## 2025-04-18 RX ORDER — DULOXETIN HYDROCHLORIDE 60 MG/1
60 CAPSULE, DELAYED RELEASE ORAL DAILY
Qty: 30 CAPSULE | Refills: 12 | Status: SHIPPED | OUTPATIENT
Start: 2025-04-18 | End: 2025-04-22 | Stop reason: SINTOL

## 2025-04-22 DIAGNOSIS — F33.1 MODERATE EPISODE OF RECURRENT MAJOR DEPRESSIVE DISORDER: ICD-10-CM

## 2025-04-22 DIAGNOSIS — M79.7 FIBROMYALGIA: Primary | ICD-10-CM

## 2025-04-22 DIAGNOSIS — F41.9 ANXIETY: ICD-10-CM

## 2025-04-22 RX ORDER — PREGABALIN 50 MG/1
50 CAPSULE ORAL 2 TIMES DAILY
Qty: 60 CAPSULE | Refills: 1 | Status: SHIPPED | OUTPATIENT
Start: 2025-04-22

## 2025-04-22 RX ORDER — BUPROPION HYDROCHLORIDE 300 MG/1
300 TABLET ORAL DAILY
Qty: 30 TABLET | Refills: 12 | Status: SHIPPED | OUTPATIENT
Start: 2025-04-22

## 2025-04-22 RX ORDER — ATORVASTATIN CALCIUM 10 MG/1
10 TABLET, FILM COATED ORAL DAILY
Qty: 90 TABLET | Refills: 3 | Status: SHIPPED | OUTPATIENT
Start: 2025-04-22

## 2025-05-01 NOTE — ASSESSMENT & PLAN NOTE
Patient's (Body mass index is 35.96 kg/m².) indicates that they are morbidly/severely obese (BMI > 40 or > 35 with obesity - related health condition) with health conditions that include diabetes mellitus and dyslipidemias . Weight is worsening. BMI  is above average; BMI management plan is completed. We discussed low calorie, low carb based diet program, portion control, and increasing exercise.

## 2025-05-01 NOTE — ASSESSMENT & PLAN NOTE
Patient's depression is a recurrent episode that is moderate without psychosis. Depression is active and worsening.    Plan:   Begin new antidepressant medicine; cymbalta    Followup in 4 weeks.

## 2025-06-01 DIAGNOSIS — K21.9 GASTROESOPHAGEAL REFLUX DISEASE, UNSPECIFIED WHETHER ESOPHAGITIS PRESENT: ICD-10-CM

## 2025-06-01 DIAGNOSIS — K44.9 HIATAL HERNIA: ICD-10-CM

## 2025-06-02 NOTE — PROGRESS NOTES
Subjective   Alexus Serrano is a 49 y.o. female. Presents to [facility]    No chief complaint on file.    Patient states that she is here for 1 mos follow up on medication change, changed her from Cymbalta to lyrica    Depression  Visit:  Follow-up  Follow-up Visit:     Medication compliance:  %    Symptoms: no chest pain, no excessive worry, no irritability, no thoughts that death would be easier, no hopelessness and no feelings of worthlessness      Frequency:  Occasionally    Severity:  Moderate    Sleep quality:  Fair    Nighttime awakenings:     PMH Includes: depression      Treatment tried:  SSRI    Improvement on treatment:  Moderate  Fibromyalgia  Symptoms are chronic.   Onset was more than 5 years.   Symptoms occur constantly.   Symptoms have been coming and going since onset.   Pertinent negative symptoms include no chest pain, no headaches, no numbness, no vomiting and no weakness.   Aggravating factors include walking, exertion and standing.   Treatments tried: lyrica.   Improvement on treatment was mild.        She will be seeing Sleep medicine in July.       I personally reviewed and updated the patient's allergies, medications, problem list, and past medical, surgical, social, and family history. I have reviewed and confirmed the accuracy of the History of Present Illness and Review of Symptoms as documented by the MA/LPN/RN. Mela Casey MD    Allergies:  No Known Allergies    Social History:  Social History     Socioeconomic History    Marital status: Single   Tobacco Use    Smoking status: Former     Current packs/day: 1.00     Average packs/day: 1 pack/day for 4.3 years (4.3 ttl pk-yrs)     Types: Cigarettes     Start date: 8/2/2021     Passive exposure: Never    Tobacco comments:     Vapes daily         Vaping Use    Vaping status: Every Day    Substances: Nicotine, Flavoring    Devices: Pre-filled or refillable cartridge   Substance and Sexual Activity    Alcohol  use: Not Currently    Drug use: Not Currently     Types: Methamphetamines     Comment: clean for 27 months     Sexual activity: Not Currently       Family History:  Family History   Problem Relation Age of Onset    Cancer Mother 61        lung    Coronary artery disease Mother 48    Heart attack Father 58    Coronary artery disease Father     Colon cancer Maternal Grandmother 62    Breast cancer Paternal Aunt 40         from it    Breast cancer Cousin 51         from it    Ovarian cancer Neg Hx        Past Medical History :  Patient Active Problem List   Diagnosis    Fibromyalgia    DDD (degenerative disc disease), lumbar    Premenstrual dysphoric syndrome    Essential hypertension    Anxiety    Moderate episode of recurrent major depressive disorder    GERD (gastroesophageal reflux disease)    Snoring    Right ovarian cyst    Class 2 severe obesity due to excess calories with serious comorbidity and body mass index (BMI) of 36.0 to 36.9 in adult    Stress incontinence    Daytime somnolence    Mixed hyperlipidemia    Borderline diabetes    Reactive airway disease with acute exacerbation    Hiatal hernia    Abnormal CXR    Palpitations    Microhematuria    Colon cancer screening    Recurrent cold sores    Polyarthralgia    Antibiotic-induced yeast infection    Eustachian tube disorder, bilateral       Medication List:    Current Outpatient Medications:     acyclovir (ZOVIRAX) 5 % ointment, Apply 1 Application topically to the appropriate area as directed 5 (Five) Times a Day. Begin treatment at earliest sign or symptom., Disp: 2 each, Rfl: 12    albuterol sulfate  (90 Base) MCG/ACT inhaler, Inhale 2 puffs Every 4 (Four) Hours As Needed for Wheezing., Disp: 18 g, Rfl: 0    atorvastatin (LIPITOR) 10 MG tablet, Take 1 tablet by mouth once daily, Disp: 90 tablet, Rfl: 3    azithromycin (Zithromax Z-Skyler) 250 MG tablet, Take 2 tablets by mouth on day 1, then 1 tablet daily on days 2-5, Disp: 6 tablet, Rfl:  "0    buPROPion XL (WELLBUTRIN XL) 300 MG 24 hr tablet, Take 1 tablet by mouth Daily., Disp: 30 tablet, Rfl: 12    celecoxib (CeleBREX) 200 MG capsule, Take 1 capsule by mouth 2 (Two) Times a Day., Disp: 60 capsule, Rfl: 12    coenzyme Q10 100 MG capsule, Take 1 capsule by mouth Daily., Disp: , Rfl:     estradiol-norethindrone (ACTIVELLA) 1-0.5 MG per tablet, Take 1 tablet by mouth Daily., Disp: , Rfl:     metFORMIN ER (GLUCOPHAGE-XR) 500 MG 24 hr tablet, Take 1 tablet by mouth Daily With Breakfast., Disp: 30 tablet, Rfl: 12    Omega-3 1000 MG capsule, Take  by mouth., Disp: , Rfl:     omeprazole (priLOSEC) 40 MG capsule, Take 1 capsule by mouth once daily, Disp: 30 capsule, Rfl: 12    predniSONE (DELTASONE) 10 MG (21) dose pack, Use as directed on package, Disp: 21 tablet, Rfl: 0    pregabalin (LYRICA) 100 MG capsule, Take 1 capsule by mouth 2 (Two) Times a Day., Disp: 60 capsule, Rfl: 1    valACYclovir (VALTREX) 1000 MG tablet, Take 1 tablet by mouth Daily., Disp: , Rfl:     fluticasone (FLONASE) 50 MCG/ACT nasal spray, 2 sprays into the nostril(s) as directed by provider Daily for 30 days. Administer 2 sprays in each nostril for each dose., Disp: 16 g, Rfl: 0    Past Surgical History:  Past Surgical History:   Procedure Laterality Date    CHOLECYSTECTOMY      TUBAL ABDOMINAL LIGATION           Physical Exam:      Vital Signs:    Vitals:    06/06/25 1607   BP: 142/90   Pulse: 87   Resp: 18   Temp: 95.1 °F (35.1 °C)   SpO2: 97%        /90   Pulse 87   Temp 95.1 °F (35.1 °C) (Temporal)   Resp 18   Ht 167.6 cm (65.98\")   Wt 102 kg (224 lb 6.4 oz)   SpO2 97%   BMI 36.24 kg/m²     Wt Readings from Last 3 Encounters:   06/06/25 102 kg (224 lb 6.4 oz)   04/18/25 101 kg (222 lb 12.8 oz)   01/17/25 97.5 kg (215 lb)       Result Review :   The following data was reviewed by: Mela Casey MD on 06/06/2025:    Data reviewed : Rheumatoid labs done at [facility]- negative for rheumatoid diseases         Physical " Exam  Vitals reviewed.   Constitutional:       Appearance: Normal appearance. She is well-developed.   HENT:      Head: Normocephalic and atraumatic.      Right Ear: External ear normal. No decreased hearing noted. No tenderness. No middle ear effusion. Tympanic membrane is not injected, erythematous, retracted or bulging.      Left Ear: External ear normal. No decreased hearing noted. No tenderness.  No middle ear effusion. Tympanic membrane is not injected, erythematous, retracted or bulging.      Nose: Nose normal. No rhinorrhea.      Mouth/Throat:      Pharynx: No oropharyngeal exudate or posterior oropharyngeal erythema.   Eyes:      General:         Right eye: No discharge.         Left eye: No discharge.   Cardiovascular:      Rate and Rhythm: Normal rate and regular rhythm.      Heart sounds: Normal heart sounds. No murmur heard.     No friction rub. No gallop.   Pulmonary:      Effort: Pulmonary effort is normal. No respiratory distress.      Breath sounds: Normal breath sounds. No wheezing or rales.   Lymphadenopathy:      Cervical: No cervical adenopathy.   Skin:     General: Skin is warm and dry.      Findings: No rash.   Neurological:      Mental Status: She is alert and oriented to person, place, and time.      Coordination: Coordination normal.      Gait: Gait normal.   Psychiatric:         Behavior: Behavior is cooperative.         Assessment and Plan:  Problems Addressed this Visit          Cardiac and Vasculature    Essential hypertension    Hypertension is borderline  Dietary sodium restriction.  Weight loss.  Blood pressure will be reassessedat the next regular appointment.            ENT    Eustachian tube disorder, bilateral    Discussed flonase use    Diagnosis, treatment and and course discussed. Potential side effects discussed. Return if there is worsening or persistence of symptoms.               Endocrine and Metabolic    Class 2 severe obesity due to excess calories with serious  comorbidity and body mass index (BMI) of 36.0 to 36.9 in adult    Patient's (Body mass index is 36.24 kg/m².) indicates that they are morbidly/severely obese (BMI > 40 or > 35 with obesity - related health condition) with health conditions that include hypertension . Weight is worsening. BMI  is above average; BMI management plan is completed. We discussed low calorie, low carb based diet program, portion control, and increasing exercise.             Infectious Diseases    Antibiotic-induced yeast infection    Diflucan sent in  If worsens, she is to let me know            Mental Health    Moderate episode of recurrent major depressive disorder    Patient's depression is a recurrent episode that is moderate without psychosis. Depression is active and improving with treatment.    Plan:   Continue current medication therapy     Followup at the next regular appointment.             Musculoskeletal and Injuries    Fibromyalgia - Primary    Worse, Increase lyrica    Diagnosis, treatment and and course discussed. Potential side effects discussed. Return in 4-6 weeks to re-evaluate             Relevant Medications    pregabalin (LYRICA) 100 MG capsule    Polyarthralgia     Diagnoses         Codes Comments      Fibromyalgia    -  Primary ICD-10-CM: M79.7  ICD-9-CM: 729.1       Moderate episode of recurrent major depressive disorder     ICD-10-CM: F33.1  ICD-9-CM: 296.32       Polyarthralgia     ICD-10-CM: M25.50  ICD-9-CM: 719.49       Antibiotic-induced yeast infection     ICD-10-CM: B37.9, T36.95XA  ICD-9-CM: 112.9, E930.9       Class 2 severe obesity due to excess calories with serious comorbidity and body mass index (BMI) of 36.0 to 36.9 in adult     ICD-10-CM: E66.812, E66.01, Z68.36  ICD-9-CM: 278.01, V85.36       Eustachian tube disorder, bilateral     ICD-10-CM: H69.93  ICD-9-CM: 381.9       Essential hypertension     ICD-10-CM: I10  ICD-9-CM: 401.9                          An After Visit Summary and PPPS were given  to the patient.       This document is intended for medical expert use only. Reading of this document by patients and/or patient's family without participating medical staff guidance may result in misinterpretation and unintended morbidity. Any interpretation of such data is the responsibility of the patient and/or family member responsible for the patient in concert with their primary or specialist providers, not to be left for sources of online searches such as [employer], [employer] or similar queries. Relying on these approaches to knowledge may result in misinterpretation, misguided goals of care and even death should patients or family members try recommendations outside of the realm of professional medical care.

## 2025-06-03 ENCOUNTER — TELEMEDICINE (OUTPATIENT)
Dept: FAMILY MEDICINE CLINIC | Facility: TELEHEALTH | Age: 50
End: 2025-06-03
Payer: COMMERCIAL

## 2025-06-03 DIAGNOSIS — J06.9 UPPER RESPIRATORY TRACT INFECTION, UNSPECIFIED TYPE: Primary | ICD-10-CM

## 2025-06-03 RX ORDER — AZITHROMYCIN 250 MG/1
TABLET, FILM COATED ORAL
Qty: 6 TABLET | Refills: 0 | Status: SHIPPED | OUTPATIENT
Start: 2025-06-03

## 2025-06-03 RX ORDER — PREDNISONE 10 MG/1
TABLET ORAL
Qty: 21 TABLET | Refills: 0 | Status: SHIPPED | OUTPATIENT
Start: 2025-06-03

## 2025-06-03 RX ORDER — VALACYCLOVIR HYDROCHLORIDE 1 G/1
1 TABLET, FILM COATED ORAL DAILY
COMMUNITY
Start: 2025-05-20

## 2025-06-03 RX ORDER — ALBUTEROL SULFATE 90 UG/1
2 INHALANT RESPIRATORY (INHALATION) EVERY 4 HOURS PRN
Qty: 18 G | Refills: 0 | Status: SHIPPED | OUTPATIENT
Start: 2025-06-03

## 2025-06-03 RX ORDER — OMEPRAZOLE 40 MG/1
40 CAPSULE, DELAYED RELEASE ORAL DAILY
Qty: 30 CAPSULE | Refills: 12 | Status: SHIPPED | OUTPATIENT
Start: 2025-06-03

## 2025-06-03 RX ORDER — DEXTROMETHORPHAN HYDROBROMIDE AND PROMETHAZINE HYDROCHLORIDE 15; 6.25 MG/5ML; MG/5ML
5 SYRUP ORAL 4 TIMES DAILY PRN
Qty: 120 ML | Refills: 0 | Status: SHIPPED | OUTPATIENT
Start: 2025-06-03 | End: 2025-06-06

## 2025-06-03 NOTE — PATIENT INSTRUCTIONS
Upper Respiratory Infection, Adult  An upper respiratory infection (URI) is a common viral infection of the nose, throat, and upper air passages that lead to the lungs. The most common type of URI is the common cold. URIs usually get better on their own, without medical treatment.  What are the causes?  A URI is caused by a virus. You may catch a virus by:  Breathing in droplets from an infected person's cough or sneeze.  Touching something that has been exposed to the virus (is contaminated) and then touching your mouth, nose, or eyes.  What increases the risk?  You are more likely to get a URI if:  You are very young or very old.  You have close contact with others, such as at work, school, or a health care facility.  You smoke.  You have long-term (chronic) heart or lung disease.  You have a weakened disease-fighting system (immune system).  You have nasal allergies or asthma.  You are experiencing a lot of stress.  You have poor nutrition.  What are the signs or symptoms?  A URI usually involves some of the following symptoms:  Runny or stuffy (congested) nose.  Cough.  Sneezing.  Sore throat.  Headache.  Fatigue.  Fever.  Loss of appetite.  Pain in your forehead, behind your eyes, and over your cheekbones (sinus pain).  Muscle aches.  Redness or irritation of the eyes.  Pressure in the ears or face.  How is this diagnosed?  This condition may be diagnosed based on your medical history and symptoms, and a physical exam. Your health care provider may use a swab to take a mucus sample from your nose (nasal swab). This sample can be tested to determine what virus is causing the illness.  How is this treated?  URIs usually get better on their own within 7-10 days. Medicines cannot cure URIs, but your health care provider may recommend certain medicines to help relieve symptoms, such as:  Over-the-counter cold medicines.  Cough suppressants. Coughing is a type of defense against infection that helps to clear the  respiratory system, so take these medicines only as recommended by your health care provider.  Fever-reducing medicines.  Follow these instructions at home:  Activity  Rest as needed.  If you have a fever, stay home from work or school until your fever is gone or until your health care provider says your URI cannot spread to other people (is no longer contagious). Your health care provider may have you wear a face mask to prevent your infection from spreading.  Relieving symptoms  Gargle with a mixture of salt and water 3-4 times a day or as needed. To make salt water, completely dissolve ½-1 tsp (3-6 g) of salt in 1 cup (237 mL) of warm water.  Use a cool-mist humidifier to add moisture to the air. This can help you breathe more easily.  Eating and drinking    Drink enough fluid to keep your urine pale yellow.  Eat soups and other clear broths.  General instructions    Take over-the-counter and prescription medicines only as told by your health care provider. These include cold medicines, fever reducers, and cough suppressants.  Do not use any products that contain nicotine or tobacco. These products include cigarettes, chewing tobacco, and vaping devices, such as e-cigarettes. If you need help quitting, ask your health care provider.  Stay away from secondhand smoke.  Stay up to date on all immunizations, including the yearly (annual) flu vaccine.  Keep all follow-up visits. This is important.  How to prevent the spread of infection to others  URIs can be contagious. To prevent the infection from spreading:  Wash your hands with soap and water for at least 20 seconds. If soap and water are not available, use hand .  Avoid touching your mouth, face, eyes, or nose.  Cough or sneeze into a tissue or your sleeve or elbow instead of into your hand or into the air.    Contact a health care provider if:  You are getting worse instead of better.  You have a fever or chills.  Your mucus is brown or red.  You have  yellow or brown discharge coming from your nose.  You have pain in your face, especially when you bend forward.  You have swollen neck glands.  You have pain while swallowing.  You have white areas in the back of your throat.  Get help right away if:  You have shortness of breath that gets worse.  You have severe or persistent:  Headache.  Ear pain.  Sinus pain.  Chest pain.  You have chronic lung disease along with any of the following:  Making high-pitched whistling sounds when you breathe, most often when you breathe out (wheezing).  Prolonged cough (more than 14 days).  Coughing up blood.  A change in your usual mucus.  You have a stiff neck.  You have changes in your:  Vision.  Hearing.  Thinking.  Mood.  These symptoms may be an emergency. Get help right away. Call 911.  Do not wait to see if the symptoms will go away.  Do not drive yourself to the hospital.  Summary  An upper respiratory infection (URI) is a common infection of the nose, throat, and upper air passages that lead to the lungs.  A URI is caused by a virus.  URIs usually get better on their own within 7-10 days.  Medicines cannot cure URIs, but your health care provider may recommend certain medicines to help relieve symptoms.  This information is not intended to replace advice given to you by your health care provider. Make sure you discuss any questions you have with your health care provider.  Document Revised: 07/20/2022 Document Reviewed: 07/20/2022  [employer] Patient Education © 2024 [employer]Acute Bronchitis, Adult    Acute bronchitis is sudden inflammation of the main airways (bronchi) that come off the windpipe (trachea) in the lungs. The swelling causes the airways to get smaller and make more mucus than normal. This can make it hard to breathe and can cause coughing or noisy breathing (wheezing).  Acute bronchitis may last several weeks. The cough may last longer. Allergies, asthma, and exposure to smoke may make the condition  worse.  What are the causes?  This condition can be caused by germs and by substances that irritate the lungs, including:  Cold and flu viruses. The most common cause of this condition is the virus that causes the common cold.  Bacteria. This is less common.  Breathing in substances that irritate the lungs, including:  Smoke from cigarettes and other forms of tobacco.  Dust and pollen.  Fumes from household cleaning products, gases, or burned fuel.  Indoor or outdoor air pollution.  What increases the risk?  The following factors may make you more likely to develop this condition:  A weak body's defense system, also called the immune system.  A condition that affects your lungs and breathing, such as asthma.  What are the signs or symptoms?  Common symptoms of this condition include:  Coughing. This may bring up clear, yellow, or green mucus from your lungs (sputum).  Wheezing.  Runny or stuffy nose.  Having too much mucus in your lungs (chest congestion).  Shortness of breath.  Aches and pains, including sore throat or chest.  How is this diagnosed?  This condition is usually diagnosed based on:  Your symptoms and medical history.  A physical exam.  You may also have other tests, including tests to rule out other conditions, such as pneumonia. These tests include:  A test of lung function.  Test of a mucus sample to look for the presence of bacteria.  Tests to check the oxygen level in your blood.  Blood tests.  Chest X-ray.  How is this treated?  Most cases of acute bronchitis clear up over time without treatment. Your health care provider may recommend:  Drinking more fluids to help thin your mucus so it is easier to cough up.  Taking inhaled medicine (inhaler) to improve air flow in and out of your lungs.  Using a vaporizer or a humidifier. These are machines that add water to the air to help you breathe better.  Taking a medicine that thins mucus and clears congestion (expectorant).  Taking a medicine that  prevents or stops coughing (cough suppressant).  It is not common to take an antibiotic medicine for this condition.  Follow these instructions at home:    Take over-the-counter and prescription medicines only as told by your health care provider.  Use an inhaler, vaporizer, or humidifier as told by your health care provider.  Take two teaspoons (10 mL) of honey at bedtime to lessen coughing at night.  Drink enough fluid to keep your urine pale yellow.  Do not use any products that contain nicotine or tobacco. These products include cigarettes, chewing tobacco, and vaping devices, such as e-cigarettes. If you need help quitting, ask your health care provider.  Get plenty of rest.  Return to your normal activities as told by your health care provider. Ask your health care provider what activities are safe for you.  Keep all follow-up visits. This is important.  How is this prevented?  To lower your risk of getting this condition again:  Wash your hands often with soap and water for at least 20 seconds. If soap and water are not available, use hand .  Avoid contact with people who have cold symptoms.  Try not to touch your mouth, nose, or eyes with your hands.  Avoid breathing in smoke or chemical fumes. Breathing smoke or chemical fumes will make your condition worse.  Get the flu shot every year.  Contact a health care provider if:  Your symptoms do not improve after 2 weeks.  You have trouble coughing up the mucus.  Your cough keeps you awake at night.  You have a fever.  Get help right away if you:  Cough up blood.  Feel pain in your chest.  Have severe shortness of breath.  Faint or keep feeling like you are going to faint.  Have a severe headache.  Have a fever or chills that get worse.  These symptoms may represent a serious problem that is an emergency. Do not wait to see if the symptoms will go away. Get medical help right away. Call your local emergency services (911 in the [place] Do not drive  yourself to the hospital.  Summary  Acute bronchitis is inflammation of the main airways (bronchi) that come off the windpipe (trachea) in the lungs. The swelling causes the airways to get smaller and make more mucus than normal.  Drinking more fluids can help thin your mucus so it is easier to cough up.  Take over-the-counter and prescription medicines only as told by your health care provider.  Do not use any products that contain nicotine or tobacco. These products include cigarettes, chewing tobacco, and vaping devices, such as e-cigarettes. If you need help quitting, ask your health care provider.  Contact a health care provider if your symptoms do not improve after 2 weeks.  This information is not intended to replace advice given to you by your health care provider. Make sure you discuss any questions you have with your health care provider.  Document Revised: 03/30/2023 Document Reviewed: 04/20/2022  [employer] Patient Education © 2024 [employer]

## 2025-06-03 NOTE — PROGRESS NOTES
You have chosen to receive care through a telehealth visit.  Do you consent to use a video/audio connection for your medical care today? Yes     Patient or patient representative verbalized consent for the use of Ambient Listening during the visit with  OBDULIA Graf for chart documentation. 6/3/2025  08:51 EDT    CHIEF COMPLAINT  No chief complaint on file.        HPI  History of Present Illness  The patient is a 49-year-old female who presented with complaints of cough and congestion.    She reports an onset of symptoms on , characterized by severe chest and head congestion, which have progressively worsened. She experienced a loss of voice the previous night, accompanied by intermittent chills and a burning sensation in her eyes. She is uncertain about the presence of fever due to the absence of a thermometer at home. She describes a persistent sore throat, which she has been managing with lozenges due to an unpleasant taste in her mouth. Upon self-examination, she notes redness in her throat but no white spots. She has been expectorating green sputum, leading her to suspect an upper respiratory infection. She also reports wheezing and a cough that intensifies at night. She does not possess an inhaler at home. She typically experiences sinus or upper respiratory infections during the spring and fall seasons, which often progress to bronchitis due to the onset of wheezing.       Review of Systems  See HPI    Past Medical History:   Diagnosis Date    Anxiety     Depression     Fibromyalgia     GERD (gastroesophageal reflux disease)     Pelvic pain 10/12/2021    Rash 2022       Family History   Problem Relation Age of Onset    Cancer Mother 61        lung    Coronary artery disease Mother 48    Heart attack Father 58    Coronary artery disease Father     Colon cancer Maternal Grandmother 62    Breast cancer Paternal Aunt 40         from it    Breast cancer Cousin 51         from it     Ovarian cancer Neg Hx        Social History     Socioeconomic History    Marital status: Single   Tobacco Use    Smoking status: Former     Current packs/day: 1.00     Average packs/day: 1 pack/day for 4.2 years (4.2 ttl pk-yrs)     Types: Cigarettes     Start date: 8/2/2021     Passive exposure: Never    Tobacco comments:     Vapes daily         Vaping Use    Vaping status: Every Day    Substances: Nicotine, Flavoring    Devices: Pre-filled or refillable cartridge   Substance and Sexual Activity    Alcohol use: Not Currently    Drug use: Not Currently     Types: Methamphetamines     Comment: clean for 27 months     Sexual activity: Not Currently       Alexus Serrano  reports that she has quit smoking. Her smoking use included cigarettes. She started smoking about 3 years ago. She has a 4.2 pack-year smoking history. She has never been exposed to tobacco smoke. She does not have any smokeless tobacco history on file.             There were no vitals taken for this visit.    PHYSICAL EXAM  Physical Exam   Constitutional: She is oriented to person, place, and time. She appears well-developed and well-nourished. She does not have a sickly appearance. She does not appear ill.   Raspy voice   HENT:   Head: Normocephalic and atraumatic.   Pulmonary/Chest: Effort normal.  No respiratory distress (persistent cough; no audible wheezing).  Neurological: She is alert and oriented to person, place, and time.           Diagnoses and all orders for this visit:    1. Upper respiratory tract infection, unspecified type (Primary)  -     predniSONE (DELTASONE) 10 MG (21) dose pack; Use as directed on package  Dispense: 21 tablet; Refill: 0  -     albuterol sulfate  (90 Base) MCG/ACT inhaler; Inhale 2 puffs Every 4 (Four) Hours As Needed for Wheezing.  Dispense: 18 g; Refill: 0  -     promethazine-dextromethorphan (PROMETHAZINE-DM) 6.25-15 MG/5ML syrup; Take 5 mL by mouth 4 (Four) Times a Day As Needed for Cough.  Dispense:  120 mL; Refill: 0  -     azithromycin (Zithromax Z-Skyler) 250 MG tablet; Take 2 tablets by mouth on day 1, then 1 tablet daily on days 2-5  Dispense: 6 tablet; Refill: 0    --take medications as prescribed  --will hold Zpack for 3-5 days and start if no improvement on other medications  --increase fluids, rest as needed, tylenol or ibuprofen for pain  --f/u in 3-5 days if no improvement      Assessment & Plan  1. Bronchitis.  - Symptoms include chest congestion, head congestion, sore throat, and wheezing, which are indicative of bronchitis.  - Physical exam findings include a red throat without white spots, suggesting a viral etiology.  - Discussed the likely viral nature of the condition and the typical progression of symptoms over 5 to 7 days. Advised to monitor symptoms and use medications as prescribed.  - Prescribed a steroid pack to alleviate symptoms and reduce drainage. An albuterol inhaler was prescribed with instructions to administer 2 puffs every 4 hours as needed for wheezing or shortness of breath. Promethazine DM was prescribed for cough management, to be taken every 6 hours as needed. A Z-Skyler was also prescribed, to be initiated if there is no improvement in symptoms after 2 to 3 days.         FOLLOW-UP  As discussed during visit with PCP/[facility] if no improvement or Urgent Care/Emergency Department if worsening of symptoms    Patient verbalizes understanding of medication dosage, comfort measures, instructions for treatment and follow-up.    Anitha Goss, OBDULIA  06/03/2025  08:51 EDT    Mode of Visit: Video  Location of patient: -HOME-  Location of provider: +HOME+  You have chosen to receive care through a telehealth visit.  The patient has signed the video visit consent form.  The visit included audio and video interaction. No technical issues occurred during this visit.    The use of a video visit has been reviewed with the patient and verbal informed consent has been obtained. Myself and Alexus  Daysi Serrano     participated in this visit. The patient is located in [address] [place] [address]  I am located in [place], [place]. [employer] and Virtual [employer] Video Client were utilized. I spent 1 minutes in the patient's chart for this visit.      Note Disclaimer: At [facility], we believe that sharing information builds trust and better   relationships. You are receiving this note because you recently visited [facility]. It is possible you   will see health information before a provider has talked with you about it. This kind of information can   be easy to misunderstand. To help you fully understand what it means for your health, we urge you to   discuss this note with your provider.

## 2025-06-06 ENCOUNTER — OFFICE VISIT (OUTPATIENT)
Dept: FAMILY MEDICINE CLINIC | Facility: CLINIC | Age: 50
End: 2025-06-06
Payer: COMMERCIAL

## 2025-06-06 VITALS
WEIGHT: 224.4 LBS | BODY MASS INDEX: 36.07 KG/M2 | TEMPERATURE: 95.1 F | SYSTOLIC BLOOD PRESSURE: 142 MMHG | OXYGEN SATURATION: 97 % | HEIGHT: 66 IN | HEART RATE: 87 BPM | RESPIRATION RATE: 18 BRPM | DIASTOLIC BLOOD PRESSURE: 90 MMHG

## 2025-06-06 DIAGNOSIS — B37.9 ANTIBIOTIC-INDUCED YEAST INFECTION: ICD-10-CM

## 2025-06-06 DIAGNOSIS — T36.95XA ANTIBIOTIC-INDUCED YEAST INFECTION: ICD-10-CM

## 2025-06-06 DIAGNOSIS — M79.7 FIBROMYALGIA: Primary | ICD-10-CM

## 2025-06-06 DIAGNOSIS — I10 ESSENTIAL HYPERTENSION: ICD-10-CM

## 2025-06-06 DIAGNOSIS — F33.1 MODERATE EPISODE OF RECURRENT MAJOR DEPRESSIVE DISORDER: ICD-10-CM

## 2025-06-06 DIAGNOSIS — H69.93 EUSTACHIAN TUBE DISORDER, BILATERAL: ICD-10-CM

## 2025-06-06 DIAGNOSIS — E66.01 CLASS 2 SEVERE OBESITY DUE TO EXCESS CALORIES WITH SERIOUS COMORBIDITY AND BODY MASS INDEX (BMI) OF 36.0 TO 36.9 IN ADULT: ICD-10-CM

## 2025-06-06 DIAGNOSIS — M25.50 POLYARTHRALGIA: ICD-10-CM

## 2025-06-06 DIAGNOSIS — E66.812 CLASS 2 SEVERE OBESITY DUE TO EXCESS CALORIES WITH SERIOUS COMORBIDITY AND BODY MASS INDEX (BMI) OF 36.0 TO 36.9 IN ADULT: ICD-10-CM

## 2025-06-06 PROCEDURE — 99214 OFFICE O/P EST MOD 30 MIN: CPT | Performed by: FAMILY MEDICINE

## 2025-06-08 RX ORDER — FLUCONAZOLE 150 MG/1
150 TABLET ORAL ONCE
Qty: 1 TABLET | Refills: 0 | Status: SHIPPED | OUTPATIENT
Start: 2025-06-08 | End: 2025-06-08

## 2025-06-08 RX ORDER — PREGABALIN 100 MG/1
100 CAPSULE ORAL 2 TIMES DAILY
Qty: 60 CAPSULE | Refills: 1 | Status: SHIPPED | OUTPATIENT
Start: 2025-06-08

## 2025-06-17 NOTE — ASSESSMENT & PLAN NOTE
Discussed flonase use    Diagnosis, treatment and and course discussed. Potential side effects discussed. Return if there is worsening or persistence of symptoms.

## 2025-06-17 NOTE — ASSESSMENT & PLAN NOTE
Patient's depression is a recurrent episode that is moderate without psychosis. Depression is active and improving with treatment.    Plan:   Continue current medication therapy     Followup at the next regular appointment.

## 2025-06-17 NOTE — ASSESSMENT & PLAN NOTE
Hypertension is borderline  Dietary sodium restriction.  Weight loss.  Blood pressure will be reassessedat the next regular appointment.

## 2025-06-17 NOTE — ASSESSMENT & PLAN NOTE
Patient's (Body mass index is 36.24 kg/m².) indicates that they are morbidly/severely obese (BMI > 40 or > 35 with obesity - related health condition) with health conditions that include hypertension . Weight is worsening. BMI  is above average; BMI management plan is completed. We discussed low calorie, low carb based diet program, portion control, and increasing exercise.

## 2025-06-17 NOTE — ASSESSMENT & PLAN NOTE
Worse, Increase lyrica    Diagnosis, treatment and and course discussed. Potential side effects discussed. Return in 4-6 weeks to re-evaluate

## 2025-07-01 ENCOUNTER — OFFICE VISIT (OUTPATIENT)
Dept: SLEEP MEDICINE | Facility: CLINIC | Age: 50
End: 2025-07-01
Payer: COMMERCIAL

## 2025-07-01 VITALS
HEIGHT: 66 IN | DIASTOLIC BLOOD PRESSURE: 80 MMHG | OXYGEN SATURATION: 98 % | SYSTOLIC BLOOD PRESSURE: 134 MMHG | HEART RATE: 73 BPM | BODY MASS INDEX: 37.25 KG/M2 | WEIGHT: 231.8 LBS

## 2025-07-01 DIAGNOSIS — G47.19 EXCESSIVE DAYTIME SLEEPINESS: ICD-10-CM

## 2025-07-01 DIAGNOSIS — I10 ESSENTIAL HYPERTENSION: ICD-10-CM

## 2025-07-01 DIAGNOSIS — R35.1 NOCTURIA: ICD-10-CM

## 2025-07-01 DIAGNOSIS — G47.30 OBSERVED SLEEP APNEA: Primary | ICD-10-CM

## 2025-07-01 DIAGNOSIS — G47.8 NON-RESTORATIVE SLEEP: ICD-10-CM

## 2025-07-01 DIAGNOSIS — R06.83 SNORING: ICD-10-CM

## 2025-07-01 DIAGNOSIS — E66.812 CLASS 2 OBESITY: ICD-10-CM

## 2025-07-01 PROBLEM — R51.9 MORNING HEADACHE: Status: ACTIVE | Noted: 2025-07-01

## 2025-07-01 PROCEDURE — G0463 HOSPITAL OUTPT CLINIC VISIT: HCPCS

## 2025-07-01 NOTE — PROGRESS NOTES
[facility]  Sleep Medicine  [address], [address]  [place] [address]  Phone   Fax       Alexus Serrano  [id number]   1975  49 y.o.  female      Referring physician/provider and PCP Mela Casey MD    Type of service: Initial Sleep Medicine Consult.  Date of service: 7/1/2025      Chief Complaint   Patient presents with    Fatigue    Morning Headaches    Snoring    Witnessed Apnea    Unable To Fall Asleep    Unable To Stay Asleep    Frequent Awakenings       History of present illness;  Thank you for asking to see Alexus Aggarwaldemarco, 49 y.o.. The patient was seen today on 7/1/2025 at [facility].  The patient presents today with symptoms of snoring, non-restorative sleep and witnessed apneas. The symptoms are present for Verus and they are persistent in nature.  The snoring is present in all positions and it is loud.  Patient has no prior surgery namely tonsillectomy, nasal surgery and UPPP.  Also has a history of fibromyalgia.  She reports that she has to go to the bathroom multiple times in the night.    Patient gives the following sleep history.  Sleep schedule:  Bedtime: Between 9 and 10 PM  Wake time: 5:30 AM  Normally takes about 30 minutes to fall asleep  Average hours of sleep 78  Number of naps per day 0  Symptoms   In addition to snoring, nonrestorative sleep and witnessed apneas patient gives the following associated symptoms.  Have you ever awakened gasping for breath, coughing, choking: Yes   Change in weight,  Yes   Morning headaches  Yes   Awaken with a sore throat or dry mouth  Yes   Leg jerking at night:  No   Crawly feeling/urge sensation to move in the legs: Yes   Teeth grinding:No   Have you ever awakened at night with a sour taste or burning sensation in your chest:  Yes   Do you have muscle weakness with laughing or anger or sleep paralysis:  No   Have you ever felt paralyzed while going to sleep or waking up:  No  "  Sleepwalking, nightmares, No   Nocturia (urination at night): 3- 5 times per night  Memory Problem:No     Adapted from STOP-BANG Questionnaire  Moctezuma F et al. Anesthesiology 2008;108:812-21.    Does the patient SNORE? Yes    Does the patient feel TIRED, fatigued or sleepy during the day? Yes    Has anyone OBSERVED the stop breathing or cough/gasp during sleep? Yes    Does the patient have high blood PRESSURE? Yes    Is the patient's BMI greater than 35? Yes  Body mass index is 37.99 kg/m².   Is the patient’s AGE over 50 years old? No  49 y.o.   Is the patient's NECK size greater than 17 in for a male and 16 in for a female? No  Neck Circumference: 15 inches   Is the patient’s GENDER male? No       Total \"Yes\" Responses; 5    0-2 \"Yes\" Responses = Low Risk of MILTON  3-4 \"Yes\" Responses = Intermediate Risk of MILTON  5-8 \"Yes\" Responses = High Risk MILTON    MEDICAL CONDITIONS (PMH)   Hypertension  Diabetes mellitus  Nocturia  Fibromyalgia  Hyperlipidemia    Social history:  Do you drive a commercial vehicle:  No   Shift work:  No   Tobacco use:  Yes e-cigarettes  Alcohol use: 0 per week  Caffeinated drinks: 1      Family Hx (parents and siblings) (pertaining to sleep medicine)  Sleep apnea, father  Lung cancer mother she is   COPD  Hypertension    Medications: reviewed    Review of systems:  Positive symptoms are :  Snoring  Witnessed apnea  Daytime excessive sleepiness with Cabot Sleepiness Scale of Total score: 17   Fatigue  Nocturia      Physical exam:  CONSTITUTINONAL:  Vitals:    25 1300   BP: 134/80   BP Location: Left arm   Patient Position: Sitting   Pulse: 73   SpO2: 98%   Weight: 105 kg (231 lb 12.8 oz)   Height: 166.4 cm (65.5\")    Body mass index is 37.99 kg/m².   NOSE:no nasal septal defects, nasal passages are clear, no nasal polyps,   THROAT: tonsils are not enlarged, tongue normal size, oral airway Mallampati class 3  NECK:Neck Circumference: 15 inches, trachea is in the midline, thyroid not " enlarged  RESPIRATORY SYSTEM: Breath sounds are normal, there are no wheezes  CARDIOVASULAR SYSTEM: Heart sounds are regular rhythm and normal rate, no edema  NEUROLOGICAL SYSTEM: Oriented x 3, No speech defect, gait is normal  PSYCHIATRIC SYSTEM: Mood is normal, thought content is normal    Office notes from care team reviewed. Office note dated June 6, 2025,reviewed  Labs reviewed.  TSH Results:  TSH          2/6/2025    10:30   TSH   TSH 0.752       Most Recent A1C          2/6/2025    10:30   HGBA1C Most Recent   Hemoglobin A1C 5.8         Assessment and plan:  Witnessed apneas,(R06.81) patient's symptoms and examination is consistent with sleep apnea (G47.30). I have talked to the patient about the signs and symptoms of sleep apnea. In addition, I have also discussed pathophysiology of sleep apnea.  I also discussed the complications of untreated sleep apnea including effects on hypertension, diabetes mellitus and nonrestorative sleep with hypersomnia which can increase risk for motor vehicle accidents.  Untreated sleep apnea is also a risk factor for development of atrial fibrillation, pulmonary hypertension, and insulin resistance and stroke.  Discussed in detail of various testing methods including home-based and lab based sleep studies.  Based on history and physical examination and other comorbidities the most appropriate study is home sleep test.  [facility].  The test will be scheduled after approval from insurance. Treatment and management will be discussed after the test is completed.  Patient was given opportunity to ask questions and all the questions were answered.   Snoring (R06.83), snoring is the sound created by turbulent airflow vibrating upper airway soft tissue due to limitation of inspiratory airflow. I have also discussed factors affecting snoring including sleep deprivation, sleeping on the back and alcohol ingestion. To minimize snoring, patient is  advised to have adequate sleep, sleep on the side and avoid alcohol and sedative medications before bedtime  Daytime excessive sleepiness .  It was assessed with Eugene Sleepiness Scale of Total score: 17.  There are many causes for daytime excessive sleepiness including sleep depression, shiftwork syndrome, depression and other medical disorders including heart, kidney and liver failure.  The most serious cause of excessive sleepiness is due to neurological conditions like narcolepsy/cataplexy.  But the most common cause of excessive sleepiness is due to sleep apnea with frequent awakenings during sleep time.  I have discussed safety of driving and to remain vigilant while driving.  Obesity 2, patient's BMI is Body mass index is 37.99 kg/m².. I have discussed the relationship between weight and sleep apnea.There is direct correlation between weight and severity of sleep apnea.  Weight reduction is encouraged, as it is going to reduce the severity of sleep apnea. I have also discussed with the patient diet and exercise to achieve ideal body weight.  Nocturia, sleep apnea is highly related with the nocturia treating sleep apnea should improve the symptoms  Hypertension  Diabetes mellitus  Return for 31 to 90 days after PAP setup with down load..  Patient's questions were answered      I once again thank you for asking me to see this patient in consultation and I have forwarded my opinion and treatment plan.  Please do not hesitate to call me if you have any questions.   7/1/2025  Madeleine Luz MD  Sleep Medicine  Medical Director  [facility] [place] [facility]

## 2025-07-03 NOTE — PROGRESS NOTES
Subjective   Alexus Serrano is a 49 y.o. female. Presents to [facility]    Chief Complaint   Patient presents with    Depression    Fibromyalgia       Depression  Visit:  Follow-up  Follow-up Visit:     Medication compliance:  %    Symptoms: insomnia      Symptoms: no chest pain, no confusion, no depressed mood, no dizziness, no excessive worry, no irritability, no malaise/fatigue, no nausea, is not nervous/anxious, no palpitations, no panic, no shortness of breath, no thoughts that death would be easier, no hopelessness and no feelings of worthlessness      Frequency:  Occasionally    Severity:  Moderate    Sleep per night:  6 hours (2-6 hours)    Sleep quality:  Poor    Nighttime awakenings:  Often    PMH Includes: depression      Treatment tried:  SSRI    Improvement on treatment:  Moderate  Fibromyalgia  Symptoms are chronic.   Onset was more than 5 years.   Symptoms occur constantly.   Symptoms have been coming and going since onset.   Symptoms include myalgias.    Pertinent negative symptoms include no abdominal pain, no joint pain, no change in stool, no chest pain, no fatigue, no headaches, no joint swelling, no nausea, no numbness, no vertigo, no visual change, no vomiting and no weakness.   Aggravating factors include walking, exertion and standing.   Treatments tried: lyrica.   Improvement on treatment was mild.   Additional information:  Patient reports discomfort with right sided sciatic pain, started last Friday      She has not noticed a difference in her meds. She saw sleep medicine and told she has sleep apnea but her insurance is not in network for the sleep study.   So she is still tired. She has been referred to a sleep facility in her network.     I personally reviewed and updated the patient's allergies, medications, problem list, and past medical, surgical, social, and family history. I have reviewed and confirmed the accuracy of the History of Present Illness and  Review of Symptoms as documented by the MA/LPN/RN. Mela Casey MD    Allergies:  No Known Allergies    Social History:  Social History     Socioeconomic History    Marital status: Single   Tobacco Use    Smoking status: Former     Current packs/day: 0.00     Average packs/day: 1 pack/day for 0.8 years (0.8 ttl pk-yrs)     Types: Cigarettes     Start date: 2021     Quit date:      Years since quitting: 3.5     Passive exposure: Never    Smokeless tobacco: Current    Tobacco comments:     Vapes daily         Vaping Use    Vaping status: Every Day    Substances: Nicotine, Flavoring    Devices: Disposable, Pre-filled pod   Substance and Sexual Activity    Alcohol use: Not Currently    Drug use: Not Currently     Types: Methamphetamines     Comment: clean for 27 months     Sexual activity: Not Currently       Family History:  Family History   Problem Relation Age of Onset    Cancer Mother 61        lung    Coronary artery disease Mother 48    Sleep apnea Father     Heart attack Father 58    Coronary artery disease Father     Breast cancer Paternal Aunt 40         from it    Colon cancer Maternal Grandmother 62    Breast cancer Cousin 51         from it    Ovarian cancer Neg Hx        Past Medical History :  Patient Active Problem List   Diagnosis    Fibromyalgia    DDD (degenerative disc disease), lumbar    Premenstrual dysphoric syndrome    Essential hypertension    Anxiety    Moderate episode of recurrent major depressive disorder    GERD (gastroesophageal reflux disease)    Snoring    Right ovarian cyst    Class 2 obesity    Stress incontinence    Excessive daytime sleepiness    Mixed hyperlipidemia    Borderline diabetes    Reactive airway disease with acute exacerbation    Hiatal hernia    Abnormal CXR    Palpitations    Microhematuria    Colon cancer screening    Recurrent cold sores    Polyarthralgia    Antibiotic-induced yeast infection    Eustachian tube disorder, bilateral    Observed sleep  apnea    Non-restorative sleep    Morning headache    Nocturia    Right sided sciatica       Medication List:    Current Outpatient Medications:     acyclovir (ZOVIRAX) 5 % ointment, Apply 1 Application topically to the appropriate area as directed 5 (Five) Times a Day. Begin treatment at earliest sign or symptom., Disp: 2 each, Rfl: 12    albuterol sulfate  (90 Base) MCG/ACT inhaler, Inhale 2 puffs Every 4 (Four) Hours As Needed for Wheezing., Disp: 18 g, Rfl: 0    atorvastatin (LIPITOR) 10 MG tablet, Take 1 tablet by mouth once daily, Disp: 90 tablet, Rfl: 3    buPROPion XL (WELLBUTRIN XL) 300 MG 24 hr tablet, Take 1 tablet by mouth Daily., Disp: 30 tablet, Rfl: 12    celecoxib (CeleBREX) 200 MG capsule, Take 1 capsule by mouth 2 (Two) Times a Day., Disp: 60 capsule, Rfl: 12    coenzyme Q10 100 MG capsule, Take 1 capsule by mouth Daily., Disp: , Rfl:     estradiol-norethindrone (ACTIVELLA) 1-0.5 MG per tablet, Take 1 tablet by mouth Daily., Disp: , Rfl:     metFORMIN ER (GLUCOPHAGE-XR) 500 MG 24 hr tablet, Take 1 tablet by mouth Daily With Breakfast., Disp: 30 tablet, Rfl: 12    Omega-3 1000 MG capsule, Take  by mouth., Disp: , Rfl:     omeprazole (priLOSEC) 40 MG capsule, Take 1 capsule by mouth once daily, Disp: 30 capsule, Rfl: 12    pregabalin (LYRICA) 150 MG capsule, Take 1 capsule by mouth 2 (Two) Times a Day., Disp: 60 capsule, Rfl: 5    valACYclovir (VALTREX) 1000 MG tablet, Take 1 tablet by mouth Daily., Disp: , Rfl:     fluticasone (FLONASE) 50 MCG/ACT nasal spray, 2 sprays into the nostril(s) as directed by provider Daily for 30 days. Administer 2 sprays in each nostril for each dose., Disp: 16 g, Rfl: 0    Past Surgical History:  Past Surgical History:   Procedure Laterality Date    CHOLECYSTECTOMY      TUBAL ABDOMINAL LIGATION           Physical Exam:      Vital Signs:    Vitals:    07/10/25 1559   BP: 128/82   Pulse: 95   Resp: 18   Temp: 97 °F (36.1 °C)   SpO2: 99%        /82 (BP  "Location: Right arm, Patient Position: Sitting, Cuff Size: Adult)   Pulse 95   Temp 97 °F (36.1 °C) (Temporal)   Resp 18   Ht 166.4 cm (65.5\")   Wt 107 kg (235 lb 6.4 oz)   SpO2 99%   BMI 38.58 kg/m²     Wt Readings from Last 3 Encounters:   07/10/25 107 kg (235 lb 6.4 oz)   07/01/25 105 kg (231 lb 12.8 oz)   06/06/25 102 kg (224 lb 6.4 oz)       Result Review :                Physical Exam  Vitals reviewed.   Constitutional:       Appearance: Normal appearance. She is well-developed.   HENT:      Head: Normocephalic and atraumatic.   Eyes:      General:         Right eye: No discharge.         Left eye: No discharge.   Cardiovascular:      Rate and Rhythm: Normal rate and regular rhythm.      Heart sounds: Normal heart sounds. No murmur heard.     No friction rub. No gallop.   Pulmonary:      Effort: Pulmonary effort is normal. No respiratory distress.      Breath sounds: Normal breath sounds. No wheezing or rales.   Skin:     General: Skin is warm and dry.      Findings: No rash.   Neurological:      Mental Status: She is alert and oriented to person, place, and time.      Coordination: Coordination normal.      Gait: Gait normal.   Psychiatric:         Behavior: Behavior is cooperative.         Assessment and Plan:  Problems Addressed this Visit          Musculoskeletal and Injuries    Fibromyalgia    Worse, Increase lyrica     Diagnosis, treatment and and course discussed. Potential side effects discussed. Return in 4-6 weeks to re-evaluate         Relevant Medications    pregabalin (LYRICA) 150 MG capsule    Right sided sciatica    Stable  Continue lyrica            Neuro    DDD (degenerative disc disease), lumbar - Primary     Diagnoses         Codes Comments      Degeneration of intervertebral disc of lumbar region with discogenic back pain and lower extremity pain    -  Primary ICD-10-CM: M51.362  ICD-9-CM: 722.52       Right sided sciatica     ICD-10-CM: M54.31  ICD-9-CM: 724.3       Fibromyalgia     " ICD-10-CM: M79.7  ICD-9-CM: 729.1                          An After Visit Summary and PPPS were given to the patient.       This document is intended for medical expert use only. Reading of this document by patients and/or patient's family without participating medical staff guidance may result in misinterpretation and unintended morbidity. Any interpretation of such data is the responsibility of the patient and/or family member responsible for the patient in concert with their primary or specialist providers, not to be left for sources of online searches such as [employer], [employer] or similar queries. Relying on these approaches to knowledge may result in misinterpretation, misguided goals of care and even death should patients or family members try recommendations outside of the realm of professional medical care.

## 2025-07-08 DIAGNOSIS — G47.00 INSOMNIA, UNSPECIFIED TYPE: Primary | ICD-10-CM

## 2025-07-08 DIAGNOSIS — R40.0 DAYTIME SOMNOLENCE: ICD-10-CM

## 2025-07-10 ENCOUNTER — OFFICE VISIT (OUTPATIENT)
Dept: FAMILY MEDICINE CLINIC | Facility: CLINIC | Age: 50
End: 2025-07-10
Payer: COMMERCIAL

## 2025-07-10 VITALS
BODY MASS INDEX: 37.83 KG/M2 | SYSTOLIC BLOOD PRESSURE: 128 MMHG | WEIGHT: 235.4 LBS | RESPIRATION RATE: 18 BRPM | DIASTOLIC BLOOD PRESSURE: 82 MMHG | HEART RATE: 95 BPM | OXYGEN SATURATION: 99 % | TEMPERATURE: 97 F | HEIGHT: 66 IN

## 2025-07-10 DIAGNOSIS — M54.31 RIGHT SIDED SCIATICA: ICD-10-CM

## 2025-07-10 DIAGNOSIS — M51.362 DEGENERATION OF INTERVERTEBRAL DISC OF LUMBAR REGION WITH DISCOGENIC BACK PAIN AND LOWER EXTREMITY PAIN: Primary | ICD-10-CM

## 2025-07-10 DIAGNOSIS — M79.7 FIBROMYALGIA: ICD-10-CM

## 2025-07-10 PROCEDURE — 99214 OFFICE O/P EST MOD 30 MIN: CPT | Performed by: FAMILY MEDICINE

## 2025-07-10 RX ORDER — PREGABALIN 150 MG/1
150 CAPSULE ORAL 2 TIMES DAILY
Qty: 60 CAPSULE | Refills: 5 | Status: SHIPPED | OUTPATIENT
Start: 2025-07-10
